# Patient Record
Sex: FEMALE | Race: WHITE | NOT HISPANIC OR LATINO | Employment: OTHER | URBAN - METROPOLITAN AREA
[De-identification: names, ages, dates, MRNs, and addresses within clinical notes are randomized per-mention and may not be internally consistent; named-entity substitution may affect disease eponyms.]

---

## 2017-01-06 ENCOUNTER — OFFICE VISIT (OUTPATIENT)
Dept: INTERNAL MEDICINE | Facility: CLINIC | Age: 72
End: 2017-01-06
Payer: MEDICARE

## 2017-01-06 VITALS
DIASTOLIC BLOOD PRESSURE: 78 MMHG | BODY MASS INDEX: 23.07 KG/M2 | HEIGHT: 67 IN | HEART RATE: 67 BPM | WEIGHT: 147 LBS | SYSTOLIC BLOOD PRESSURE: 136 MMHG

## 2017-01-06 DIAGNOSIS — E78.5 HYPERLIPIDEMIA, UNSPECIFIED HYPERLIPIDEMIA TYPE: ICD-10-CM

## 2017-01-06 DIAGNOSIS — F43.21 ADJUSTMENT DISORDER WITH DEPRESSED MOOD: Primary | ICD-10-CM

## 2017-01-06 DIAGNOSIS — I25.10 CORONARY ARTERY DISEASE INVOLVING NATIVE CORONARY ARTERY OF NATIVE HEART WITHOUT ANGINA PECTORIS: ICD-10-CM

## 2017-01-06 DIAGNOSIS — I10 ESSENTIAL HYPERTENSION: ICD-10-CM

## 2017-01-06 DIAGNOSIS — C44.02 SQUAMOUS CELL CARCINOMA, LIP: ICD-10-CM

## 2017-01-06 PROCEDURE — 1157F ADVNC CARE PLAN IN RCRD: CPT | Mod: S$GLB,,, | Performed by: INTERNAL MEDICINE

## 2017-01-06 PROCEDURE — 99499 UNLISTED E&M SERVICE: CPT | Mod: S$PBB,,, | Performed by: INTERNAL MEDICINE

## 2017-01-06 PROCEDURE — 1126F AMNT PAIN NOTED NONE PRSNT: CPT | Mod: S$GLB,,, | Performed by: INTERNAL MEDICINE

## 2017-01-06 PROCEDURE — 99999 PR PBB SHADOW E&M-EST. PATIENT-LVL III: CPT | Mod: PBBFAC,,, | Performed by: INTERNAL MEDICINE

## 2017-01-06 PROCEDURE — 99214 OFFICE O/P EST MOD 30 MIN: CPT | Mod: S$GLB,,, | Performed by: INTERNAL MEDICINE

## 2017-01-06 PROCEDURE — 3078F DIAST BP <80 MM HG: CPT | Mod: S$GLB,,, | Performed by: INTERNAL MEDICINE

## 2017-01-06 PROCEDURE — 1159F MED LIST DOCD IN RCRD: CPT | Mod: S$GLB,,, | Performed by: INTERNAL MEDICINE

## 2017-01-06 PROCEDURE — 3075F SYST BP GE 130 - 139MM HG: CPT | Mod: S$GLB,,, | Performed by: INTERNAL MEDICINE

## 2017-01-06 PROCEDURE — 1160F RVW MEDS BY RX/DR IN RCRD: CPT | Mod: S$GLB,,, | Performed by: INTERNAL MEDICINE

## 2017-01-06 RX ORDER — METOPROLOL TARTRATE 50 MG/1
50 TABLET ORAL 2 TIMES DAILY
Qty: 180 TABLET | Refills: 3 | Status: SHIPPED | OUTPATIENT
Start: 2017-01-06 | End: 2017-06-19 | Stop reason: SDUPTHER

## 2017-01-06 RX ORDER — ALPRAZOLAM 0.25 MG/1
TABLET ORAL
Qty: 20 TABLET | Refills: 0 | Status: SHIPPED | OUTPATIENT
Start: 2017-01-06 | End: 2017-03-23 | Stop reason: SDUPTHER

## 2017-01-06 RX ORDER — AMLODIPINE BESYLATE 2.5 MG/1
2.5 TABLET ORAL DAILY
Qty: 90 TABLET | Refills: 3 | Status: SHIPPED | OUTPATIENT
Start: 2017-01-06 | End: 2017-06-19 | Stop reason: SDUPTHER

## 2017-01-06 RX ORDER — MIRTAZAPINE 15 MG/1
15 TABLET, FILM COATED ORAL NIGHTLY
Qty: 90 TABLET | Refills: 1 | Status: SHIPPED | OUTPATIENT
Start: 2017-01-06 | End: 2017-06-19 | Stop reason: SDUPTHER

## 2017-01-06 RX ORDER — OMEPRAZOLE 20 MG/1
20 CAPSULE, DELAYED RELEASE ORAL DAILY
Qty: 90 CAPSULE | Refills: 3 | Status: SHIPPED | OUTPATIENT
Start: 2017-01-06 | End: 2017-06-19 | Stop reason: SDUPTHER

## 2017-01-06 RX ORDER — RAMIPRIL 10 MG/1
10 CAPSULE ORAL 2 TIMES DAILY
Qty: 180 CAPSULE | Refills: 3 | Status: SHIPPED | OUTPATIENT
Start: 2017-01-06 | End: 2017-06-19 | Stop reason: SDUPTHER

## 2017-01-06 RX ORDER — ROSUVASTATIN CALCIUM 20 MG/1
20 TABLET, COATED ORAL DAILY
Qty: 90 TABLET | Refills: 3 | Status: SHIPPED | OUTPATIENT
Start: 2017-01-06 | End: 2017-06-19 | Stop reason: SDUPTHER

## 2017-01-06 NOTE — MR AVS SNAPSHOT
[facility] [address]  [address]  [address]  [facility] [address]  Phone: [phone]                  Jacqueline O Favret   2017 11:30 AM   Office Visit    Description:  Female : 1945   Provider:  Kristina Orozco MD   Department:  [facility] [address]           Reason for Visit     Follow-up           Diagnoses this Visit        Comments    Adjustment disorder with depressed mood    -  Primary     Essential hypertension         Coronary artery disease involving native coronary artery of native heart without angina pectoris         Squamous cell carcinoma, lip         Hyperlipidemia, unspecified hyperlipidemia type                To Do List           Future Appointments        Provider Department Dept Phone    2017 8:15 AM LAB, [place] [facility] [phone]    2017 9:20 AM Ladonna Davalos MD [place] - Dermatology [phone]      Goals (5 Years of Data)     None      Follow-Up and Disposition     Return in about 6 months (around 2017).       These Medications        Disp Refills Start End    alprazolam (XANAX) 0.25 MG tablet 20 tablet 0 2017     Half to one tablet preflight    Pharmacy: [facility]/pharmacy [address] [place] - [address] [facility] Ph #: [phone]       ramipril (ALTACE) 10 MG capsule 180 capsule 3 2017     Take 1 capsule (10 mg total) by mouth 2 (two) times daily. - Oral    Pharmacy: [facility]/pharmacy [address] [place] - [address] [facility] Ph #: [phone]       mirtazapine (REMERON) 15 MG tablet 90 tablet 1 2017    Take 1 tablet (15 mg total) by mouth every evening. - Oral    Pharmacy: [facility]/pharmacy [address] [place] [address] [place] - [address] [facility] Ph #: [phone]       amlodipine (NORVASC) 2.5 MG tablet 90 tablet 3 2017    Take 1 tablet (2.5 mg total) by mouth once daily. - Oral    Pharmacy:  [facility]/pharmacy [address] [place] [address] - [address] [facility] #: [phone]       omeprazole (PRILOSEC) 20 MG capsule 90 capsule 3 1/6/2017     Take 1 capsule (20 mg total) by mouth once daily. - Oral    Pharmacy: [facility]/pharmacy [address] - [address] [facility] #: [phone]       rosuvastatin (CRESTOR) 20 MG tablet 90 tablet 3 1/6/2017     Take 1 tablet (20 mg total) by mouth once daily. - Oral    Pharmacy: [facility]/pharmacy [address] [place] [address] - [address] [facility] #: [phone]       metoprolol tartrate (LOPRESSOR) 50 MG tablet 180 tablet 3 1/6/2017     Take 1 tablet (50 mg total) by mouth 2 (two) times daily. - Oral    [facility] [place] [address] [facility] [address] [facility] #: [phone]         [facility]     [facility] On Call Nurse Care Line - 24/7 Assistance  Registered nurses in the [facility] On Call Center provide clinical advisement, health education, appointment booking, and other advisory services.  Call for this free service at [phone].             Medications           Message regarding Medications     Verify the changes and/or additions to your medication regime listed below are the same as discussed with your clinician today.  If any of these changes or additions are incorrect, please notify your healthcare provider.             Verify that the below list of medications is an accurate representation of the medications you are currently taking.  If none reported, the list may be blank. If incorrect, please contact your healthcare provider. Carry this list with you in case of emergency.           Current Medications     acetaminophen (TYLENOL) 325 MG tablet Take 325 mg by mouth every 6 (six) hours as needed.    alprazolam (XANAX) 0.25 MG tablet Half to one tablet preflight    amlodipine (NORVASC) 2.5 MG tablet Take 1 tablet (2.5 mg total) by mouth once daily.    fexofenadine  "(ALLEGRA) 180 MG tablet Take by mouth daily as needed. 1 Tablet Oral Every day    Lactobacillus rhamnosus GG (CULTURELLE) 10 billion cell capsule Take 1 capsule by mouth once daily.    metoprolol tartrate (LOPRESSOR) 50 MG tablet Take 1 tablet (50 mg total) by mouth 2 (two) times daily.    mirtazapine (REMERON) 15 MG tablet Take 1 tablet (15 mg total) by mouth every evening.    multivitamin (ONE DAILY MULTIVITAMIN) per tablet Take 1 tablet by mouth once daily.    omeprazole (PRILOSEC) 20 MG capsule Take 1 capsule (20 mg total) by mouth once daily.    ramipril (ALTACE) 10 MG capsule Take 1 capsule (10 mg total) by mouth 2 (two) times daily.    rosuvastatin (CRESTOR) 20 MG tablet Take 1 tablet (20 mg total) by mouth once daily.           Clinical Reference Information           Vital Signs - Last Recorded  Most recent update: 1/6/2017 12:16 PM by Lala Bonds MA    BP Pulse Ht Wt LMP BMI    136/78 67 5' 7" (1.702 m) 66.7 kg (147 lb) (LMP Unknown) 23.02 kg/m2      Blood Pressure          Most Recent Value    BP  136/78      Allergies as of 1/6/2017     No Known Allergies      Immunizations Administered on Date of Encounter - 1/6/2017     Name Date Dose VIS Date Route    Zoster  Incomplete 0.65 mL 10/6/2009 Subcutaneous      Orders Placed During Today's Visit      Normal Orders This Visit    Zoster Vaccine - Live     Future Labs/Procedures Expected by Expires    Comprehensive metabolic panel  1/6/2017 1/6/2018      "

## 2017-01-09 ENCOUNTER — LAB VISIT (OUTPATIENT)
Dept: LAB | Facility: HOSPITAL | Age: 72
End: 2017-01-09
Attending: INTERNAL MEDICINE
Payer: MEDICARE

## 2017-01-09 ENCOUNTER — PATIENT MESSAGE (OUTPATIENT)
Dept: INTERNAL MEDICINE | Facility: CLINIC | Age: 72
End: 2017-01-09

## 2017-01-09 DIAGNOSIS — I10 ESSENTIAL HYPERTENSION: ICD-10-CM

## 2017-01-09 LAB
ALBUMIN SERPL BCP-MCNC: 3.8 G/DL
ALP SERPL-CCNC: 71 U/L
ALT SERPL W/O P-5'-P-CCNC: 14 U/L
ANION GAP SERPL CALC-SCNC: 8 MMOL/L
AST SERPL-CCNC: 23 U/L
BILIRUB SERPL-MCNC: 0.6 MG/DL
BUN SERPL-MCNC: 15 MG/DL
CALCIUM SERPL-MCNC: 9.6 MG/DL
CHLORIDE SERPL-SCNC: 100 MMOL/L
CO2 SERPL-SCNC: 31 MMOL/L
CREAT SERPL-MCNC: 0.8 MG/DL
EST. GFR  (AFRICAN AMERICAN): >60 ML/MIN/1.73 M^2
EST. GFR  (NON AFRICAN AMERICAN): >60 ML/MIN/1.73 M^2
GLUCOSE SERPL-MCNC: 108 MG/DL
POTASSIUM SERPL-SCNC: 4.3 MMOL/L
PROT SERPL-MCNC: 7.1 G/DL
SODIUM SERPL-SCNC: 139 MMOL/L

## 2017-01-09 PROCEDURE — 80053 COMPREHEN METABOLIC PANEL: CPT

## 2017-01-09 PROCEDURE — 36415 COLL VENOUS BLD VENIPUNCTURE: CPT | Mod: PO

## 2017-01-17 NOTE — PROGRESS NOTES
Subjective:       Patient ID: Jacqueline O Favret is a 71 y.o. female.    Chief Complaint: Follow-up    HPIPt feeling well very active.  No Cp or SOB.  Labs look great.  Review of Systems   Respiratory: Negative for shortness of breath (PND or orthopnea).    Cardiovascular: Negative for chest pain (arm pain or jaw pain).   Gastrointestinal: Negative for abdominal pain, diarrhea, nausea and vomiting.   Genitourinary: Negative for dysuria.   Neurological: Negative for seizures, syncope and headaches.       Objective:      Physical Exam   Constitutional: She is oriented to person, place, and time. She appears well-developed and well-nourished. No distress.   HENT:   Head: Normocephalic.   Mouth/Throat: Oropharynx is clear and moist.   Neck: Neck supple. No JVD present. No thyromegaly present.   Cardiovascular: Normal rate, regular rhythm, normal heart sounds and intact distal pulses.  Exam reveals no gallop and no friction rub.    No murmur heard.  Pulmonary/Chest: Effort normal and breath sounds normal. She has no wheezes. She has no rales.   Abdominal: Soft. Bowel sounds are normal. She exhibits no distension and no mass. There is no tenderness. There is no rebound and no guarding.   Musculoskeletal: She exhibits no edema.   Lymphadenopathy:     She has no cervical adenopathy.   Neurological: She is alert and oriented to person, place, and time.   Skin: Skin is warm and dry.   Psychiatric: She has a normal mood and affect. Her behavior is normal. Judgment and thought content normal.       Assessment:       1. Adjustment disorder with depressed mood    2. Essential hypertension    3. Coronary artery disease involving native coronary artery of native heart without angina pectoris    4. Squamous cell carcinoma, lip    5. Hyperlipidemia, unspecified hyperlipidemia type        Plan:   Adjustment disorder with depressed mood  stable    Essential hypertension  -     Comprehensive metabolic panel; Future; Expected date:  1/6/17  Controlled - continue current meds    Coronary artery disease involving native coronary artery of native heart without angina pectoris  Asymptomatic  Squamous cell carcinoma, lip  Follows closely with derm  Hyperlipidemia, unspecified hyperlipidemia type  Controlled - continue current meds    Other orders  -     alprazolam (XANAX) 0.25 MG tablet; Half to one tablet preflight  Dispense: 20 tablet; Refill: 0  -     ramipril (ALTACE) 10 MG capsule; Take 1 capsule (10 mg total) by mouth 2 (two) times daily.  Dispense: 180 capsule; Refill: 3  -     mirtazapine (REMERON) 15 MG tablet; Take 1 tablet (15 mg total) by mouth every evening.  Dispense: 90 tablet; Refill: 1  -     amlodipine (NORVASC) 2.5 MG tablet; Take 1 tablet (2.5 mg total) by mouth once daily.  Dispense: 90 tablet; Refill: 3  -     omeprazole (PRILOSEC) 20 MG capsule; Take 1 capsule (20 mg total) by mouth once daily.  Dispense: 90 capsule; Refill: 3  -     rosuvastatin (CRESTOR) 20 MG tablet; Take 1 tablet (20 mg total) by mouth once daily.  Dispense: 90 tablet; Refill: 3  -     metoprolol tartrate (LOPRESSOR) 50 MG tablet; Take 1 tablet (50 mg total) by mouth 2 (two) times daily.  Dispense: 180 tablet; Refill: 3  -     Zoster Vaccine - Live

## 2017-01-26 ENCOUNTER — OFFICE VISIT (OUTPATIENT)
Dept: OPTOMETRY | Facility: CLINIC | Age: 72
End: 2017-01-26

## 2017-01-26 ENCOUNTER — OFFICE VISIT (OUTPATIENT)
Dept: OPTOMETRY | Facility: CLINIC | Age: 72
End: 2017-01-26
Payer: MEDICARE

## 2017-01-26 DIAGNOSIS — Z46.0 ENCOUNTER FOR FITTING OR ADJUSTMENT OF SPECTACLES OR CONTACT LENSES: Primary | ICD-10-CM

## 2017-01-26 PROCEDURE — 99499 UNLISTED E&M SERVICE: CPT | Mod: S$GLB,,, | Performed by: OPTOMETRIST

## 2017-01-26 PROCEDURE — 92310 CONTACT LENS FITTING OU: CPT | Mod: S$GLB,,, | Performed by: OPTOMETRIST

## 2017-01-26 PROCEDURE — 99999 PR PBB SHADOW E&M-EST. PATIENT-LVL III: CPT | Mod: PBBFAC,,, | Performed by: OPTOMETRIST

## 2017-01-26 NOTE — PATIENT INSTRUCTIONS
Good contact lens fit in each eye. Wearing CLs well in each eye.  Doing well with monovision, but using single vision driving glasses for use over CLs.  Showing need for CL power in right eye (for distance) and in the left eye (for near).  CL Rx issued.  Return at the time of annual general eye exam - or prior if any problems in the interim.

## 2017-01-26 NOTE — PROGRESS NOTES
HPI     Contact Lens Follow Up    Additional comments: contact lens follow up.  Wears monovision SCLs.    Happy with lens comfort OD and OS.  Feels distance VA a little blurry.    Happy with near vision with CLs.           Comments   Patient in for contact lens follow-up.    Contact lenses patient currently wearing:   Air Optix Aqua SCLs                                                                        OD   8.6   14.2    +1.75   distance                                                                       OS   8.6   14.2    +3.25  near    Patient's report on visual acuity with contact lenses:  Distance:  A   little blurry                                                                                          Near good    Any problems with Contact Lens comfort?  no    Contact lens wearing time (hours/per day): 10 - 11 hours    How long have Contact Lenses been in today?  3+ hours    Refer to CL section of chart.          Last edited by Jeromy Goodman, OD on 1/26/2017 10:24 AM. (History)            Assessment /Plan     For exam results, see Encounter Report.    1. Encounter for fitting or adjustment of spectacles or contact lenses                    Good contact lens fit in each eye. Wearing CLs well in each eye.  Doing well with monovision, but using single vision driving glasses for use over CLs.  Showing need for CL power in right eye (for distance) and in the left eye (for near).  CL Rx issued.  Return at the time of annual general eye exam - or prior if any problems in the interim.

## 2017-01-29 NOTE — PROGRESS NOTES
HPI     Contact Lens Follow Up    Additional comments: Rx issue           Comments   Patient is in stating her contact lens rx with distance vision is not as   sharp as before.     (-) blurry vision   (-) flashes of light   (-) floaters        Last edited by Mi Nassar on 1/26/2017 10:00 AM. (History)            Assessment /Plan     For exam results, see Encounter Report.    Encounter for fitting or adjustment of spectacles or contact lenses

## 2017-02-03 ENCOUNTER — PATIENT MESSAGE (OUTPATIENT)
Dept: OPTOMETRY | Facility: CLINIC | Age: 72
End: 2017-02-03

## 2017-02-08 ENCOUNTER — OFFICE VISIT (OUTPATIENT)
Dept: DERMATOLOGY | Facility: CLINIC | Age: 72
End: 2017-02-08
Payer: MEDICARE

## 2017-02-08 DIAGNOSIS — L57.0 AK (ACTINIC KERATOSIS): Primary | ICD-10-CM

## 2017-02-08 DIAGNOSIS — D04.30 SQUAMOUS CELL CARCINOMA IN SITU OF SKIN OF FACE: ICD-10-CM

## 2017-02-08 PROCEDURE — 99212 OFFICE O/P EST SF 10 MIN: CPT | Mod: S$GLB,,, | Performed by: DERMATOLOGY

## 2017-02-08 PROCEDURE — 1160F RVW MEDS BY RX/DR IN RCRD: CPT | Mod: S$GLB,,, | Performed by: DERMATOLOGY

## 2017-02-08 PROCEDURE — 99999 PR PBB SHADOW E&M-EST. PATIENT-LVL II: CPT | Mod: PBBFAC,,, | Performed by: DERMATOLOGY

## 2017-02-08 PROCEDURE — 1157F ADVNC CARE PLAN IN RCRD: CPT | Mod: S$GLB,,, | Performed by: DERMATOLOGY

## 2017-02-08 PROCEDURE — 1159F MED LIST DOCD IN RCRD: CPT | Mod: S$GLB,,, | Performed by: DERMATOLOGY

## 2017-02-08 NOTE — PROGRESS NOTES
Subjective:       Patient ID:  Jacqueline O Favret is a 71 y.o. female who presents for   Chief Complaint   Patient presents with    Actinic Keratosis     bx proven lip recheck     HPI Comments: Pt here for 6 month re-check bx proven actinic keratosis left lower lip. Area was tx with efudex x 10 days with brisk response. This was near the site of previous scc in situ.  Pt reports no problems, well healed.  Not currently treating with anything.does wear lip balm with spf    Actinic Keratosis         Review of Systems   Constitutional: Negative for fever, chills, fatigue and malaise.   Skin: Positive for daily sunscreen use.   Hematologic/Lymphatic: Bruises/bleeds easily.        Objective:    Physical Exam   Constitutional: She appears well-developed and well-nourished. No distress.   Neurological: She is alert and oriented to person, place, and time. She is not disoriented.   Psychiatric: She has a normal mood and affect.   Skin:   Areas Examined (abnormalities noted in diagram):   Head / Face Inspection Performed              Diagram Legend     Erythematous scaling macule/papule c/w actinic keratosis       Vascular papule c/w angioma      Pigmented verrucoid papule/plaque c/w seborrheic keratosis      Yellow umbilicated papule c/w sebaceous hyperplasia      Irregularly shaped tan macule c/w lentigo     1-2 mm smooth white papules consistent with Milia      Movable subcutaneous cyst with punctum c/w epidermal inclusion cyst      Subcutaneous movable cyst c/w pilar cyst      Firm pink to brown papule c/w dermatofibroma      Pedunculated fleshy papule(s) c/w skin tag(s)      Evenly pigmented macule c/w junctional nevus     Mildly variegated pigmented, slightly irregular-bordered macule c/w mildly atypical nevus      Flesh colored to evenly pigmented papule c/w intradermal nevus       Pink pearly papule/plaque c/w basal cell carcinoma      Erythematous hyperkeratotic cursted plaque c/w SCC      Surgical scar with no  sign of skin cancer recurrence      Open and closed comedones      Inflammatory papules and pustules      Verrucoid papule consistent consistent with wart     Erythematous eczematous patches and plaques     Dystrophic onycholytic nail with subungual debris c/w onychomycosis     Umbilicated papule    Erythematous-base heme-crusted tan verrucoid plaque consistent with inflamed seborrheic keratosis     Erythematous Silvery Scaling Plaque c/w Psoriasis     See annotation      Assessment / Plan:        AK (actinic keratosis)- left lower lip   S/p efudex with great response  Lip balm with spf     Squamous cell carcinoma in situ of skin of face  Scar no recurrence  Cont daily spf         Return in about 6 months (around 8/8/2017).

## 2017-03-13 ENCOUNTER — OFFICE VISIT (OUTPATIENT)
Dept: INTERNAL MEDICINE | Facility: CLINIC | Age: 72
End: 2017-03-13
Payer: MEDICARE

## 2017-03-13 VITALS
DIASTOLIC BLOOD PRESSURE: 72 MMHG | WEIGHT: 145.94 LBS | BODY MASS INDEX: 22.91 KG/M2 | HEIGHT: 67 IN | SYSTOLIC BLOOD PRESSURE: 129 MMHG | TEMPERATURE: 98 F | OXYGEN SATURATION: 95 % | HEART RATE: 74 BPM

## 2017-03-13 DIAGNOSIS — J20.9 BRONCHITIS, ACUTE, WITH BRONCHOSPASM: Primary | ICD-10-CM

## 2017-03-13 PROCEDURE — 3074F SYST BP LT 130 MM HG: CPT | Mod: S$GLB,,, | Performed by: INTERNAL MEDICINE

## 2017-03-13 PROCEDURE — 1159F MED LIST DOCD IN RCRD: CPT | Mod: S$GLB,,, | Performed by: INTERNAL MEDICINE

## 2017-03-13 PROCEDURE — 99999 PR PBB SHADOW E&M-EST. PATIENT-LVL III: CPT | Mod: PBBFAC,,, | Performed by: INTERNAL MEDICINE

## 2017-03-13 PROCEDURE — 1157F ADVNC CARE PLAN IN RCRD: CPT | Mod: S$GLB,,, | Performed by: INTERNAL MEDICINE

## 2017-03-13 PROCEDURE — 1160F RVW MEDS BY RX/DR IN RCRD: CPT | Mod: S$GLB,,, | Performed by: INTERNAL MEDICINE

## 2017-03-13 PROCEDURE — 99213 OFFICE O/P EST LOW 20 MIN: CPT | Mod: S$GLB,,, | Performed by: INTERNAL MEDICINE

## 2017-03-13 PROCEDURE — 3078F DIAST BP <80 MM HG: CPT | Mod: S$GLB,,, | Performed by: INTERNAL MEDICINE

## 2017-03-13 RX ORDER — ALBUTEROL SULFATE 90 UG/1
2 AEROSOL, METERED RESPIRATORY (INHALATION) 4 TIMES DAILY
Qty: 1 INHALER | Refills: 0 | Status: SHIPPED | OUTPATIENT
Start: 2017-03-13 | End: 2017-07-06 | Stop reason: SDUPTHER

## 2017-03-13 RX ORDER — DOXYCYCLINE 100 MG/1
100 CAPSULE ORAL EVERY 12 HOURS
Qty: 20 CAPSULE | Refills: 0 | Status: SHIPPED | OUTPATIENT
Start: 2017-03-13 | End: 2017-03-23

## 2017-03-13 NOTE — PROGRESS NOTES
Jacqueline O Favret presents today to urgent care for:  Cough and Nasal Congestion      Cough   This is a new problem. The current episode started in the past 7 days. The problem has been gradually worsening. The problem occurs constantly. The cough is productive of sputum. Associated symptoms include ear congestion and wheezing. Pertinent negatives include no chest pain, chills, ear pain, headaches, hemoptysis, myalgias, nasal congestion, postnasal drip, rhinorrhea or shortness of breath. The symptoms are aggravated by lying down. She has tried OTC cough suppressant for the symptoms. The treatment provided no relief. Her past medical history is significant for bronchitis. There is no history of COPD, emphysema or pneumonia.       Past medical, social, family and surgical history was reviewed and updated today as needed. See encounter for details.     Review of Systems   Constitutional: Negative for chills.   HENT: Negative for ear pain, postnasal drip and rhinorrhea.    Respiratory: Positive for cough and wheezing. Negative for hemoptysis and shortness of breath.    Cardiovascular: Negative for chest pain.   Musculoskeletal: Negative for myalgias.   Neurological: Negative for headaches.       Vitals:    03/13/17 0810   BP: 129/72   Pulse: 74   Temp: 98.4 °F (36.9 °C)   Body mass index is 22.86 kg/(m^2).   Physical Exam   Constitutional: She is oriented to person, place, and time. She appears well-developed.   HENT:   Head: Normocephalic and atraumatic.   Right Ear: External ear normal.   Left Ear: External ear normal.   Nose: Nose normal.   Mouth/Throat: Oropharynx is clear and moist. No oropharyngeal exudate.   Eyes: Conjunctivae are normal.   Cardiovascular: Normal rate, regular rhythm and normal heart sounds.    Pulmonary/Chest: No respiratory distress. She has wheezes. She has no rales. She exhibits no tenderness.   Abdominal: Soft.   Lymphadenopathy:     She has no cervical adenopathy.   Neurological: She is  alert and oriented to person, place, and time.        Assessment/plan:   1. Bronchitis, acute, with bronchospasm  - albuterol 90 mcg/actuation inhaler; Inhale 2 puffs into the lungs 4 (four) times daily.  Dispense: 1 Inhaler; Refill: 0  - doxycycline (VIBRAMYCIN) 100 MG Cap; Take 1 capsule (100 mg total) by mouth every 12 (twelve) hours.  Dispense: 20 capsule; Refill: 0      No Follow-up on file.  All risks and benefits of medications discussed with the patient today in detail. Pt. Voiced understanding and was provided with patient educational materials regarding these medications and encouraged to read this material and call the office with any questions or concerns.

## 2017-03-13 NOTE — PATIENT INSTRUCTIONS
Bronchitis with Wheezing (Viral or Bacterial: Adult)    Bronchitis is an infection of the air passages. It often occurs during a cold and is usually caused by a virus. Symptoms include cough with mucus (phlegm) and low-grade fever. This illness is contagious during the first few days and is spread through the air by coughing and sneezing, or by direct contact (touching the sick person and then touching your own eyes, nose, or mouth).  If there is a lot of inflammation, air flow is restricted. The air passages may also go into spasm, especially if you have asthma. This causes wheezing and difficulty breathing even in people who do not have asthma.  Bronchitis usually lasts 7 to 14 days. The wheezing should improve with treatment during the first week. An inhaler is often prescribed to relax the air passages and stop wheezing. Antibiotics will be prescribed if your doctor thinks there is also a secondary bacterial infection.  Home care  · If symptoms are severe, rest at home for the first 2 to 3 days. When you go back to your usual activities, don't let yourself get too tired.  · Do not smoke. Also avoid being exposed to secondhand smoke.  · You may use over-the-counter medicine to control fever or pain, unless another medicine was prescribed. Note: If you have chronic liver or kidney disease or have ever had a stomach ulcer or gastrointestinal bleeding, talk with your healthcare provider before using these medicines. Also talk to your provider if you are taking medicine to prevent blood clots.) Aspirin should never be given to anyone younger than 18 years of age who is ill with a viral infection or fever. It may cause severe liver or brain damage.  · Your appetite may be poor, so a light diet is fine. Avoid dehydration by drinking 6 to 8 glasses of fluids per day (such as water, soft drinks, sports drinks, juices, tea, or soup). Extra fluids will help loosen secretions in the nose and lungs.  · Over-the-counter  cough, cold, and sore-throat medicines will not shorten the length of the illness, but they may be helpful to reduce symptoms. (Note: Do not use decongestants if you have high blood pressure.)  · If you were given an inhaler, use it exactly as directed. If you need to use it more often than prescribed, your condition may be worsening. If this happens, contact your healthcare provider.  · If prescribed, finish all antibiotic medicine, even if you are feeling better after only a few days.  Follow-up care  Follow up with your healthcare provider, or as advised. If you had an X-ray or ECG (electrocardiogram), a specialist will review it. You will be notified of any new findings that may affect your care.  Note: If you are age 65 or older, or if you have a chronic lung disease or condition that affects your immune system, or you smoke, talk to your healthcare provider about having a pneumococcal vaccinations and a yearly influenza vaccination (flu shot).  When to seek medical advice  Call your healthcare provider right away if any of these occur:  · Fever of 100.4°F (38°C) or higher  · Coughing up increasing amounts of colored sputum  · Weakness, drowsiness, headache, facial pain, ear pain, or a stiff neck  Call 911, or get immediate medical care  Contact emergency services right away if any of these occur.  · Coughing up blood  · Worsening weakness, drowsiness, headache, or stiff neck  · Increased wheezing not helped with medication, shortness of breath, or pain with breathing  Date Last Reviewed: 9/13/2015  © 8057-9889 [employer]. [address] [place] [address], [place] [address]. All rights reserved. This information is not intended as a substitute for professional medical care. Always follow your healthcare professional's instructions.

## 2017-03-26 RX ORDER — ALPRAZOLAM 0.25 MG/1
TABLET ORAL
Qty: 20 TABLET | Refills: 0 | Status: SHIPPED | OUTPATIENT
Start: 2017-03-26 | End: 2017-06-19 | Stop reason: SDUPTHER

## 2017-03-27 ENCOUNTER — TELEPHONE (OUTPATIENT)
Dept: INTERNAL MEDICINE | Facility: CLINIC | Age: 72
End: 2017-03-27

## 2017-03-31 DIAGNOSIS — N76.0 VULVOVAGINITIS: ICD-10-CM

## 2017-03-31 RX ORDER — FLUCONAZOLE 150 MG/1
TABLET ORAL
Qty: 1 TABLET | Refills: 1 | Status: SHIPPED | OUTPATIENT
Start: 2017-03-31 | End: 2018-01-05

## 2017-06-14 ENCOUNTER — OFFICE VISIT (OUTPATIENT)
Dept: DERMATOLOGY | Facility: CLINIC | Age: 72
End: 2017-06-14
Payer: MEDICARE

## 2017-06-14 DIAGNOSIS — L57.0 AK (ACTINIC KERATOSIS): Primary | ICD-10-CM

## 2017-06-14 PROCEDURE — 99999 PR PBB SHADOW E&M-EST. PATIENT-LVL II: CPT | Mod: PBBFAC,,, | Performed by: DERMATOLOGY

## 2017-06-14 PROCEDURE — 17003 DESTRUCT PREMALG LES 2-14: CPT | Mod: S$GLB,,, | Performed by: DERMATOLOGY

## 2017-06-14 PROCEDURE — 99499 UNLISTED E&M SERVICE: CPT | Mod: S$GLB,,, | Performed by: DERMATOLOGY

## 2017-06-14 PROCEDURE — 17000 DESTRUCT PREMALG LESION: CPT | Mod: S$GLB,,, | Performed by: DERMATOLOGY

## 2017-06-14 NOTE — PROGRESS NOTES
Subjective:       Patient ID:  Jacqueline O Favret is a 71 y.o. female who presents for   Chief Complaint   Patient presents with    Lesion     Pt c/o small bump on right nose x a few days. Feels rough and scaly. No bleeding or pain. No prev tx. Pt has hx of NMSC on the face in the past        Review of Systems   Skin: Positive for daily sunscreen use.   Hematologic/Lymphatic: Bruises/bleeds easily.        Objective:    Physical Exam   Constitutional: She appears well-developed and well-nourished. No distress.   Neurological: She is alert and oriented to person, place, and time. She is not disoriented.   Psychiatric: She has a normal mood and affect.   Skin:   Areas Examined (abnormalities noted in diagram):   Head / Face Inspection Performed              Diagram Legend     Erythematous scaling macule/papule c/w actinic keratosis       Vascular papule c/w angioma      Pigmented verrucoid papule/plaque c/w seborrheic keratosis      Yellow umbilicated papule c/w sebaceous hyperplasia      Irregularly shaped tan macule c/w lentigo     1-2 mm smooth white papules consistent with Milia      Movable subcutaneous cyst with punctum c/w epidermal inclusion cyst      Subcutaneous movable cyst c/w pilar cyst      Firm pink to brown papule c/w dermatofibroma      Pedunculated fleshy papule(s) c/w skin tag(s)      Evenly pigmented macule c/w junctional nevus     Mildly variegated pigmented, slightly irregular-bordered macule c/w mildly atypical nevus      Flesh colored to evenly pigmented papule c/w intradermal nevus       Pink pearly papule/plaque c/w basal cell carcinoma      Erythematous hyperkeratotic cursted plaque c/w SCC      Surgical scar with no sign of skin cancer recurrence      Open and closed comedones      Inflammatory papules and pustules      Verrucoid papule consistent consistent with wart     Erythematous eczematous patches and plaques     Dystrophic onycholytic nail with subungual debris c/w onychomycosis      Umbilicated papule    Erythematous-base heme-crusted tan verrucoid plaque consistent with inflamed seborrheic keratosis     Erythematous Silvery Scaling Plaque c/w Psoriasis     See annotation      Assessment / Plan:        AK (actinic keratosis)    Cryosurgery Procedure Note    Verbal consent from the patient is obtained and the patient is aware of the precancerous quality and need for treatment of these lesions. Liquid nitrogen cryosurgery is applied to the 4 actinic keratoses, as detailed in the physical exam, to produce a freeze injury. The patient is aware that blisters may form and is instructed on wound care with gentle cleansing and use of vaseline ointment to keep moist until healed. The patient is supplied a handout on cryosurgery and is instructed to call if lesions do not completely resolve.           Return in about 4 months (around 10/14/2017).

## 2017-06-19 ENCOUNTER — OFFICE VISIT (OUTPATIENT)
Dept: INTERNAL MEDICINE | Facility: CLINIC | Age: 72
End: 2017-06-19
Payer: MEDICARE

## 2017-06-19 ENCOUNTER — LAB VISIT (OUTPATIENT)
Dept: LAB | Facility: HOSPITAL | Age: 72
End: 2017-06-19
Attending: INTERNAL MEDICINE
Payer: MEDICARE

## 2017-06-19 ENCOUNTER — PATIENT MESSAGE (OUTPATIENT)
Dept: INTERNAL MEDICINE | Facility: CLINIC | Age: 72
End: 2017-06-19

## 2017-06-19 VITALS
HEART RATE: 68 BPM | WEIGHT: 143 LBS | DIASTOLIC BLOOD PRESSURE: 81 MMHG | SYSTOLIC BLOOD PRESSURE: 135 MMHG | HEIGHT: 67 IN | BODY MASS INDEX: 22.44 KG/M2

## 2017-06-19 DIAGNOSIS — R73.9 HYPERGLYCEMIA: ICD-10-CM

## 2017-06-19 DIAGNOSIS — I25.10 CORONARY ARTERY DISEASE INVOLVING NATIVE CORONARY ARTERY OF NATIVE HEART WITHOUT ANGINA PECTORIS: ICD-10-CM

## 2017-06-19 DIAGNOSIS — E78.5 HYPERLIPIDEMIA, UNSPECIFIED HYPERLIPIDEMIA TYPE: ICD-10-CM

## 2017-06-19 DIAGNOSIS — I10 ESSENTIAL HYPERTENSION: Primary | ICD-10-CM

## 2017-06-19 DIAGNOSIS — I25.10 CVD (CARDIOVASCULAR DISEASE): ICD-10-CM

## 2017-06-19 DIAGNOSIS — E55.9 MILD VITAMIN D DEFICIENCY: ICD-10-CM

## 2017-06-19 DIAGNOSIS — I10 ESSENTIAL HYPERTENSION: ICD-10-CM

## 2017-06-19 LAB
25(OH)D3+25(OH)D2 SERPL-MCNC: 36 NG/ML
ALBUMIN SERPL BCP-MCNC: 3.9 G/DL
ALP SERPL-CCNC: 63 U/L
ALT SERPL W/O P-5'-P-CCNC: 16 U/L
ANION GAP SERPL CALC-SCNC: 15 MMOL/L
AST SERPL-CCNC: 26 U/L
BASOPHILS # BLD AUTO: 0.02 K/UL
BASOPHILS NFR BLD: 0.2 %
BILIRUB SERPL-MCNC: 0.8 MG/DL
BILIRUB UR QL STRIP: NEGATIVE
BUN SERPL-MCNC: 15 MG/DL
CALCIUM SERPL-MCNC: 9.8 MG/DL
CHLORIDE SERPL-SCNC: 101 MMOL/L
CHOLEST/HDLC SERPL: 3.1 {RATIO}
CLARITY UR REFRACT.AUTO: ABNORMAL
CO2 SERPL-SCNC: 25 MMOL/L
COLOR UR AUTO: ABNORMAL
CREAT SERPL-MCNC: 0.9 MG/DL
CREAT UR-MCNC: 246 MG/DL
DIFFERENTIAL METHOD: ABNORMAL
EOSINOPHIL # BLD AUTO: 0.2 K/UL
EOSINOPHIL NFR BLD: 2.2 %
ERYTHROCYTE [DISTWIDTH] IN BLOOD BY AUTOMATED COUNT: 12.9 %
EST. GFR  (AFRICAN AMERICAN): >60 ML/MIN/1.73 M^2
EST. GFR  (NON AFRICAN AMERICAN): >60 ML/MIN/1.73 M^2
GLUCOSE SERPL-MCNC: 99 MG/DL
GLUCOSE UR QL STRIP: NEGATIVE
HCT VFR BLD AUTO: 45.4 %
HDL/CHOLESTEROL RATIO: 32.4 %
HDLC SERPL-MCNC: 207 MG/DL
HDLC SERPL-MCNC: 67 MG/DL
HGB BLD-MCNC: 14.7 G/DL
HGB UR QL STRIP: NEGATIVE
KETONES UR QL STRIP: NEGATIVE
LDLC SERPL CALC-MCNC: 105 MG/DL
LEUKOCYTE ESTERASE UR QL STRIP: NEGATIVE
LYMPHOCYTES # BLD AUTO: 1.6 K/UL
LYMPHOCYTES NFR BLD: 19.8 %
MCH RBC QN AUTO: 31.5 PG
MCHC RBC AUTO-ENTMCNC: 32.4 %
MCV RBC AUTO: 97 FL
MICROALBUMIN UR DL<=1MG/L-MCNC: 21 UG/ML
MICROALBUMIN/CREATININE RATIO: 8.5 UG/MG
MONOCYTES # BLD AUTO: 1.1 K/UL
MONOCYTES NFR BLD: 12.8 %
NEUTROPHILS # BLD AUTO: 5.4 K/UL
NEUTROPHILS NFR BLD: 65 %
NITRITE UR QL STRIP: NEGATIVE
NONHDLC SERPL-MCNC: 140 MG/DL
PH UR STRIP: 5 [PH] (ref 5–8)
PLATELET # BLD AUTO: 202 K/UL
PMV BLD AUTO: 10.9 FL
POTASSIUM SERPL-SCNC: 3.9 MMOL/L
PROT SERPL-MCNC: 7.2 G/DL
PROT UR QL STRIP: NEGATIVE
RBC # BLD AUTO: 4.66 M/UL
SODIUM SERPL-SCNC: 141 MMOL/L
SP GR UR STRIP: 1.02 (ref 1–1.03)
TRIGL SERPL-MCNC: 175 MG/DL
TSH SERPL DL<=0.005 MIU/L-ACNC: 1.7 UIU/ML
URN SPEC COLLECT METH UR: ABNORMAL
UROBILINOGEN UR STRIP-ACNC: NEGATIVE EU/DL
WBC # BLD AUTO: 8.29 K/UL

## 2017-06-19 PROCEDURE — 1126F AMNT PAIN NOTED NONE PRSNT: CPT | Mod: S$GLB,,, | Performed by: INTERNAL MEDICINE

## 2017-06-19 PROCEDURE — 90732 PPSV23 VACC 2 YRS+ SUBQ/IM: CPT | Mod: S$GLB,,, | Performed by: INTERNAL MEDICINE

## 2017-06-19 PROCEDURE — 80061 LIPID PANEL: CPT

## 2017-06-19 PROCEDURE — 99999 PR PBB SHADOW E&M-EST. PATIENT-LVL III: CPT | Mod: PBBFAC,,, | Performed by: INTERNAL MEDICINE

## 2017-06-19 PROCEDURE — 83036 HEMOGLOBIN GLYCOSYLATED A1C: CPT

## 2017-06-19 PROCEDURE — 84443 ASSAY THYROID STIM HORMONE: CPT

## 2017-06-19 PROCEDURE — 85025 COMPLETE CBC W/AUTO DIFF WBC: CPT | Mod: PO

## 2017-06-19 PROCEDURE — 36415 COLL VENOUS BLD VENIPUNCTURE: CPT | Mod: PO

## 2017-06-19 PROCEDURE — 99214 OFFICE O/P EST MOD 30 MIN: CPT | Mod: 25,S$GLB,, | Performed by: INTERNAL MEDICINE

## 2017-06-19 PROCEDURE — 1159F MED LIST DOCD IN RCRD: CPT | Mod: S$GLB,,, | Performed by: INTERNAL MEDICINE

## 2017-06-19 PROCEDURE — 80053 COMPREHEN METABOLIC PANEL: CPT

## 2017-06-19 PROCEDURE — 82306 VITAMIN D 25 HYDROXY: CPT

## 2017-06-19 PROCEDURE — G0009 ADMIN PNEUMOCOCCAL VACCINE: HCPCS | Mod: S$GLB,,, | Performed by: INTERNAL MEDICINE

## 2017-06-19 PROCEDURE — 99499 UNLISTED E&M SERVICE: CPT | Mod: S$PBB,,, | Performed by: INTERNAL MEDICINE

## 2017-06-19 RX ORDER — METOPROLOL TARTRATE 50 MG/1
50 TABLET ORAL 2 TIMES DAILY
Qty: 180 TABLET | Refills: 3 | Status: SHIPPED | OUTPATIENT
Start: 2017-06-19 | End: 2018-07-14 | Stop reason: SDUPTHER

## 2017-06-19 RX ORDER — ALPRAZOLAM 0.25 MG/1
TABLET ORAL
Qty: 20 TABLET | Refills: 0 | Status: SHIPPED | OUTPATIENT
Start: 2017-06-19 | End: 2017-09-01 | Stop reason: SDUPTHER

## 2017-06-19 RX ORDER — MIRTAZAPINE 15 MG/1
15 TABLET, FILM COATED ORAL NIGHTLY
Qty: 90 TABLET | Refills: 1 | Status: SHIPPED | OUTPATIENT
Start: 2017-06-19 | End: 2018-02-04 | Stop reason: SDUPTHER

## 2017-06-19 RX ORDER — AMLODIPINE BESYLATE 2.5 MG/1
2.5 TABLET ORAL DAILY
Qty: 90 TABLET | Refills: 3 | Status: SHIPPED | OUTPATIENT
Start: 2017-06-19 | End: 2018-06-19

## 2017-06-19 RX ORDER — ROSUVASTATIN CALCIUM 20 MG/1
20 TABLET, COATED ORAL DAILY
Qty: 90 TABLET | Refills: 3 | Status: SHIPPED | OUTPATIENT
Start: 2017-06-19 | End: 2018-07-14 | Stop reason: SDUPTHER

## 2017-06-19 RX ORDER — RAMIPRIL 10 MG/1
10 CAPSULE ORAL 2 TIMES DAILY
Qty: 180 CAPSULE | Refills: 3 | Status: SHIPPED | OUTPATIENT
Start: 2017-06-19 | End: 2018-08-10 | Stop reason: SDUPTHER

## 2017-06-19 RX ORDER — OMEPRAZOLE 20 MG/1
20 CAPSULE, DELAYED RELEASE ORAL DAILY
Qty: 90 CAPSULE | Refills: 3 | Status: SHIPPED | OUTPATIENT
Start: 2017-06-19 | End: 2018-11-05 | Stop reason: SDUPTHER

## 2017-06-19 RX ORDER — UBIDECARENONE 30 MG
30 CAPSULE ORAL DAILY
COMMUNITY

## 2017-06-20 LAB
ESTIMATED AVG GLUCOSE: 103 MG/DL
HBA1C MFR BLD HPLC: 5.2 %

## 2017-07-02 NOTE — PROGRESS NOTES
Subjective:       Patient ID: Jacqueline O Favret is a 71 y.o. female.    Chief Complaint: Follow-up    HPIPt feels well is exercising.  Minimal alcohol.  No falls.  No CP or SOB. No GI issues.  Review of Systems   Respiratory: Negative for shortness of breath (PND or orthopnea).    Cardiovascular: Negative for chest pain (arm pain or jaw pain).   Gastrointestinal: Negative for abdominal pain, diarrhea, nausea and vomiting.   Genitourinary: Negative for dysuria.   Neurological: Negative for seizures, syncope and headaches.       Objective:      Physical Exam   Constitutional: She is oriented to person, place, and time. She appears well-developed and well-nourished. No distress.   HENT:   Head: Normocephalic.   Mouth/Throat: Oropharynx is clear and moist.   Neck: Neck supple. No JVD present. No thyromegaly present.   Cardiovascular: Normal rate, regular rhythm, normal heart sounds and intact distal pulses.  Exam reveals no gallop and no friction rub.    No murmur heard.  Pulmonary/Chest: Effort normal and breath sounds normal. She has no wheezes. She has no rales.   Abdominal: Soft. Bowel sounds are normal. She exhibits no distension and no mass. There is no tenderness. There is no rebound and no guarding.   Musculoskeletal: She exhibits no edema.   Lymphadenopathy:     She has no cervical adenopathy.   Neurological: She is alert and oriented to person, place, and time.   Skin: Skin is warm and dry.   Psychiatric: She has a normal mood and affect. Her behavior is normal. Judgment and thought content normal.       Assessment:       1. Essential hypertension    2. Coronary artery disease involving native coronary artery of native heart without angina pectoris    3. Hyperlipidemia, unspecified hyperlipidemia type    4. Hyperglycemia    5. Mild vitamin D deficiency    6. CVD (cardiovascular disease)        Plan:   Essential hypertension  -     CBC auto differential; Future; Expected date: 06/19/2017  -     Comprehensive  metabolic panel; Future; Expected date: 06/19/2017  -     TSH; Future; Expected date: 06/19/2017  -     Urinalysis  -     Microalbumin/creatinine urine ratio  Controlled - continue current meds    Coronary artery disease involving native coronary artery of native heart without angina pectoris  asymptomatic  Hyperlipidemia, unspecified hyperlipidemia type  -     Lipid panel; Future; Expected date: 06/19/2017    Hyperglycemia  -     Hemoglobin A1c; Future; Expected date: 06/19/2017    Mild vitamin D deficiency  -     Vitamin D; Future; Expected date: 06/19/2017    CVD (cardiovascular disease)  -     US Carotid Bilateral; Future; Expected date: 06/19/2017    Other orders  -     Cancel: Hemoglobin A1c; Future; Expected date: 06/05/2017  -     Pneumococcal Polysaccharide Vaccine (23 Valent) (SQ/IM)  -     alprazolam (XANAX) 0.25 MG tablet; Half to one tablet preflight  Dispense: 20 tablet; Refill: 0  -     rosuvastatin (CRESTOR) 20 MG tablet; Take 1 tablet (20 mg total) by mouth once daily.  Dispense: 90 tablet; Refill: 3  -     ramipril (ALTACE) 10 MG capsule; Take 1 capsule (10 mg total) by mouth 2 (two) times daily.  Dispense: 180 capsule; Refill: 3  -     omeprazole (PRILOSEC) 20 MG capsule; Take 1 capsule (20 mg total) by mouth once daily.  Dispense: 90 capsule; Refill: 3  -     mirtazapine (REMERON) 15 MG tablet; Take 1 tablet (15 mg total) by mouth every evening.  Dispense: 90 tablet; Refill: 1  -     metoprolol tartrate (LOPRESSOR) 50 MG tablet; Take 1 tablet (50 mg total) by mouth 2 (two) times daily.  Dispense: 180 tablet; Refill: 3  -     amlodipine (NORVASC) 2.5 MG tablet; Take 1 tablet (2.5 mg total) by mouth once daily.  Dispense: 90 tablet; Refill: 3

## 2017-07-06 DIAGNOSIS — J20.9 BRONCHITIS, ACUTE, WITH BRONCHOSPASM: ICD-10-CM

## 2017-07-06 RX ORDER — ALBUTEROL SULFATE 90 UG/1
2 AEROSOL, METERED RESPIRATORY (INHALATION) 4 TIMES DAILY
Qty: 1 INHALER | Refills: 6 | Status: SHIPPED | OUTPATIENT
Start: 2017-07-06 | End: 2017-07-20

## 2017-07-06 NOTE — TELEPHONE ENCOUNTER
----- Message from Gumaro Griffinr sent at 7/6/2017 12:45 PM CDT -----  Contact: self/ [phone] cell  Type: Rx    Name of medication(s): albuterol 90 mcg/actuation inhaler    Is this a refill? New rx? refill    Who prescribed medication? Dr. Kenyon Lau    Pharmacy Name, Phone, & Location: [facility] in [place] on file    Comments: Pt is out of town and forgot the inhaler given to her by Dr. Lau.  She is having a hard time with her asthma and would like to request that a rx for an inhaler be sent in to the pharmacy.  The pharmacy states that they have sent a request with no result.  She would like a call back to discuss.  Please call and advise.    Thank you

## 2017-07-10 ENCOUNTER — TELEPHONE (OUTPATIENT)
Dept: INTERNAL MEDICINE | Facility: CLINIC | Age: 72
End: 2017-07-10

## 2017-07-10 NOTE — TELEPHONE ENCOUNTER
----- Message from Berna Small sent at 7/6/2017 10:13 AM CDT -----  Contact: pharmacy, darcie/[phone]  Pharmacy called in regards to the pt being out of town in [place] and forgot her Rx for albuterol 90 mcg/actuation inhaler at home. She need a Rx call into the pharmacy.      [facility] store [address] [place] [address]   Please advise

## 2017-07-19 ENCOUNTER — PATIENT MESSAGE (OUTPATIENT)
Dept: INTERNAL MEDICINE | Facility: CLINIC | Age: 72
End: 2017-07-19

## 2017-07-19 ENCOUNTER — HOSPITAL ENCOUNTER (OUTPATIENT)
Dept: RADIOLOGY | Facility: HOSPITAL | Age: 72
Discharge: HOME | End: 2017-07-19
Attending: INTERNAL MEDICINE
Payer: MEDICARE

## 2017-07-19 DIAGNOSIS — I25.10 CVD (CARDIOVASCULAR DISEASE): ICD-10-CM

## 2017-07-19 PROCEDURE — 93880 EXTRACRANIAL BILAT STUDY: CPT | Mod: 26,,, | Performed by: RADIOLOGY

## 2017-07-19 PROCEDURE — 93880 EXTRACRANIAL BILAT STUDY: CPT | Mod: TC

## 2017-08-20 ENCOUNTER — PATIENT MESSAGE (OUTPATIENT)
Dept: INTERNAL MEDICINE | Facility: CLINIC | Age: 72
End: 2017-08-20

## 2017-08-23 ENCOUNTER — OFFICE VISIT (OUTPATIENT)
Dept: INTERNAL MEDICINE | Facility: CLINIC | Age: 72
End: 2017-08-23
Payer: MEDICARE

## 2017-08-23 ENCOUNTER — TELEPHONE (OUTPATIENT)
Dept: ORTHOPEDICS | Facility: CLINIC | Age: 72
End: 2017-08-23

## 2017-08-23 ENCOUNTER — HOSPITAL ENCOUNTER (OUTPATIENT)
Dept: RADIOLOGY | Facility: HOSPITAL | Age: 72
Discharge: HOME | End: 2017-08-23
Attending: PHYSICIAN ASSISTANT
Payer: MEDICARE

## 2017-08-23 ENCOUNTER — HOSPITAL ENCOUNTER (OUTPATIENT)
Dept: VASCULAR SURGERY | Facility: CLINIC | Age: 72
Discharge: HOME | End: 2017-08-23
Attending: PHYSICIAN ASSISTANT

## 2017-08-23 ENCOUNTER — TELEPHONE (OUTPATIENT)
Dept: INTERNAL MEDICINE | Facility: CLINIC | Age: 72
End: 2017-08-23

## 2017-08-23 VITALS
SYSTOLIC BLOOD PRESSURE: 164 MMHG | OXYGEN SATURATION: 97 % | HEART RATE: 56 BPM | DIASTOLIC BLOOD PRESSURE: 70 MMHG | HEIGHT: 67 IN | BODY MASS INDEX: 22.7 KG/M2 | WEIGHT: 144.63 LBS
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
## 2017-08-23 DIAGNOSIS — M25.552 LEFT HIP PAIN: ICD-10-CM

## 2017-08-23 DIAGNOSIS — M79.652 LEFT THIGH PAIN: ICD-10-CM

## 2017-08-23 DIAGNOSIS — S32.592A PUBIC RAMUS FRACTURE, LEFT, CLOSED, INITIAL ENCOUNTER: Primary | ICD-10-CM

## 2017-08-23 PROCEDURE — 73502 X-RAY EXAM HIP UNI 2-3 VIEWS: CPT | Mod: TC,LT

## 2017-08-23 PROCEDURE — 99214 OFFICE O/P EST MOD 30 MIN: CPT | Mod: S$GLB,,, | Performed by: PHYSICIAN ASSISTANT

## 2017-08-23 PROCEDURE — 3008F BODY MASS INDEX DOCD: CPT | Mod: S$GLB,,, | Performed by: PHYSICIAN ASSISTANT

## 2017-08-23 PROCEDURE — 3078F DIAST BP <80 MM HG: CPT | Mod: S$GLB,,, | Performed by: PHYSICIAN ASSISTANT

## 2017-08-23 PROCEDURE — 73502 X-RAY EXAM HIP UNI 2-3 VIEWS: CPT | Mod: 26,LT,, | Performed by: RADIOLOGY

## 2017-08-23 PROCEDURE — 1159F MED LIST DOCD IN RCRD: CPT | Mod: S$GLB,,, | Performed by: PHYSICIAN ASSISTANT

## 2017-08-23 PROCEDURE — 1125F AMNT PAIN NOTED PAIN PRSNT: CPT | Mod: S$GLB,,, | Performed by: PHYSICIAN ASSISTANT

## 2017-08-23 PROCEDURE — 99499 UNLISTED E&M SERVICE: CPT | Mod: S$PBB,,, | Performed by: PHYSICIAN ASSISTANT

## 2017-08-23 PROCEDURE — 73552 X-RAY EXAM OF FEMUR 2/>: CPT | Mod: 26,LT,, | Performed by: RADIOLOGY

## 2017-08-23 PROCEDURE — 73552 X-RAY EXAM OF FEMUR 2/>: CPT | Mod: TC,LT

## 2017-08-23 PROCEDURE — 3077F SYST BP >= 140 MM HG: CPT | Mod: S$GLB,,, | Performed by: PHYSICIAN ASSISTANT

## 2017-08-23 PROCEDURE — 99999 PR PBB SHADOW E&M-EST. PATIENT-LVL V: CPT | Mod: PBBFAC,,, | Performed by: PHYSICIAN ASSISTANT

## 2017-08-23 RX ORDER — TRAMADOL HYDROCHLORIDE 50 MG/1
50 TABLET ORAL EVERY 6 HOURS PRN
Qty: 30 TABLET | Refills: 0 | Status: SHIPPED | OUTPATIENT
Start: 2017-08-23 | End: 2017-08-23 | Stop reason: SDUPTHER

## 2017-08-23 RX ORDER — TRAMADOL HYDROCHLORIDE 50 MG/1
50 TABLET ORAL EVERY 6 HOURS PRN
Qty: 30 TABLET | Refills: 0 | Status: SHIPPED | OUTPATIENT
Start: 2017-08-23 | End: 2018-01-26

## 2017-08-23 NOTE — TELEPHONE ENCOUNTER
Ortho Telephone Triage Call  [phone]  Caller: SIVA Valero PA-C/Int. Medicine requesting Ortho appt today for pt for pelvic fracture.  HX: fracture L superior and inferioir pubic ramus and left pubic bone   Resolution: PERLA Jeong PA-C notified and xrays reviewed. First available Ortho  appt scheduled tomorrow, 8/24/17, at 1:00pm with arrival at 12:45pm. . Instructs that pt use walker and  NWB on LLE. SIVA Valero PA-C notified of same- who states that she has consulted with PERLA Jeong PA-C - and will notify and instruct pt of same. Pt active in [facility] for additional appt information.

## 2017-08-23 NOTE — TELEPHONE ENCOUNTER
The pt stated that she was having trouble with her insurance and will pay for the walker herself.      AU[place],

## 2017-08-23 NOTE — TELEPHONE ENCOUNTER
----- Message from Frances Buenrostro sent at 8/23/2017  3:24 PM CDT -----  Contact: self/[phone]  Patient called in regards needing to talk with Miss Valero about patient fracture pelvic. Please call and advise.         Thank you!!!

## 2017-08-23 NOTE — PATIENT INSTRUCTIONS
Do not take mirtazapine or xanax while using tramadol    Use walker for weight bearing    See orthopedics tomorrow

## 2017-08-23 NOTE — PROGRESS NOTES
"Subjective:       Patient ID: Jacqueline O Favret is a 72 y.o. female.        Chief Complaint: Fall (L leg pain=8)    Jacqueline O Favret is an established patient of Kristina Orozco MD here today for urgent care visit.    8/12/17-tour of [place].  Lots of walking through rugged terrain.  8/15/17, slipped on a rock, and fell forward onto abdomen/legs/hands/arms.  Did not hit her head or have any LOC.  She notes no abrasions or bruises from the fall.  Later that afternoon she began getting pain in the left thigh.  She pushed herself to keep going as she was on a very [place] trip.  She was in severe pain and could "barely walk."  Once she started walking the pain did improve somewhat.  Several days later, the weather was cold/damp and her pain was worse.  This caused her to not do any further hiking.  She got home yesterday evening.  Pain level is 7-8/10.  Using Aleve.             Review of Systems   Constitutional: Negative for chills, diaphoresis, fatigue and fever.   HENT: Negative for congestion and sore throat.    Eyes: Negative for visual disturbance.   Respiratory: Negative for cough, chest tightness and shortness of breath.    Cardiovascular: Negative for chest pain, palpitations and leg swelling.   Gastrointestinal: Negative for abdominal pain, blood in stool, constipation, diarrhea, nausea and vomiting.   Genitourinary: Negative for dysuria, frequency, hematuria and urgency.   Musculoskeletal: Positive for arthralgias. Negative for back pain.   Skin: Negative for rash.   Neurological: Negative for dizziness, syncope, weakness and headaches.   Psychiatric/Behavioral: Negative for dysphoric mood and sleep disturbance. The patient is not nervous/anxious.        Objective:      Physical Exam   Constitutional: She appears well-developed and well-nourished. No distress.   HENT:   Head: Normocephalic and atraumatic.   Right Ear: External ear normal.   Left Ear: External ear normal.   Neck: Neck supple. " "  Pulmonary/Chest: Effort normal.   Abdominal: Soft.   Musculoskeletal: She exhibits no edema.        Left hip: She exhibits decreased range of motion (pain with external rotation) and tenderness. She exhibits normal strength.   Neurological: She is alert.   Skin: Skin is warm and dry. She is not diaphoretic.   Psychiatric: She has a normal mood and affect.       Assessment:       1. Pubic ramus fracture, left, closed, initial encounter    2. Fall, initial encounter    3. Left thigh pain    4. Left hip pain        Plan:       Marli was seen today for fall.    Diagnoses and all orders for this visit:    Pubic ramus fracture, left, closed, initial encounter.  Tramadol prn pain (instructed not to use mirtazapine or xanax while on tramadol).  Rx for walker given.  To see ortho tomorrow.    -     Ambulatory consult to Orthopedics    Fall, initial encounter  -     X-Ray Hip 2 or 3 views Left; Future  -     X-Ray Femur 2 View Left; Future    Left thigh pain  -     X-Ray Femur 2 View Left; Future  -     VAS US Venous Leg Left; Future-cancelled given fracture which is cause of pain    Left hip pain  -     X-Ray Hip 2 or 3 views Left; Future    X-ray showin views: There is a fracture of the left superior and inferior pubic ramus and pubic bone.    Spoke with Maday Jeong in orthopedics who recommends walker for weight bearing and to be seen in orthopedics tomorrow.      Pt has been given instructions populated from [employer] database and has verbalized understanding of the after visit summary and information contained wherein.    Follow up with a primary care provider. May go to ER for acute shortness of breath, lightheadedness, fever, or any other emergent complaints or changes in condition.    "This note will be shared with the patient"    Future Appointments  Date Time Provider Department Center   2017 1:00 PM Maday Jeong PA-C [facility] ORTHO [facility]   2017 9:15 AM Jeromy Goodman OD [facility] OPTOMTY [facility]ado "   9/13/2017 10:00 AM Ladonna Davalos MD [facility] DERM [place]

## 2017-08-24 ENCOUNTER — OFFICE VISIT (OUTPATIENT)
Dept: ORTHOPEDICS | Facility: CLINIC | Age: 72
End: 2017-08-24
Payer: MEDICARE

## 2017-08-24 DIAGNOSIS — S32.9XXA CLOSED DISPLACED FRACTURE OF PELVIS, UNSPECIFIED PART OF PELVIS, INITIAL ENCOUNTER: Primary | ICD-10-CM

## 2017-08-24 PROCEDURE — 99203 OFFICE O/P NEW LOW 30 MIN: CPT | Mod: S$GLB,,, | Performed by: PHYSICIAN ASSISTANT

## 2017-08-24 PROCEDURE — 3008F BODY MASS INDEX DOCD: CPT | Mod: S$GLB,,, | Performed by: PHYSICIAN ASSISTANT

## 2017-08-24 PROCEDURE — 99999 PR PBB SHADOW E&M-EST. PATIENT-LVL III: CPT | Mod: PBBFAC,,, | Performed by: PHYSICIAN ASSISTANT

## 2017-08-24 PROCEDURE — 1159F MED LIST DOCD IN RCRD: CPT | Mod: S$GLB,,, | Performed by: PHYSICIAN ASSISTANT

## 2017-08-25 ENCOUNTER — TELEPHONE (OUTPATIENT)
Dept: ORTHOPEDICS | Facility: CLINIC | Age: 72
End: 2017-08-25

## 2017-08-25 NOTE — PROGRESS NOTES
Subjective:      Patient ID: Jacqueline O Favret is a 72 y.o. female.    Chief Complaint: No chief complaint on file.    HPI    Patient is a 72 year old female who presents to clinic for follow up from PCP for left superior and inferior pubic rami fracture that occurred on 08/15/2017 secondary to slipping on a rock and falling from standing height.  Patient has been weight bearing as tolerated without assistance. Patient seen by PCP yesterday at time x-ray taken. X-Ray: fracture of the left superior and inferior pubic ramus and pubic bone. Patient placed on walker. She is stating that her walker is helping a lot. Patient reports pain is controlled. She denied numbness or tingling.     Review of Systems   Constitution: Negative for chills and fever.   Cardiovascular: Negative for chest pain.   Respiratory: Negative for cough and shortness of breath.    Skin: Negative for color change, dry skin, itching, nail changes, poor wound healing and rash.   Musculoskeletal:        Left pubic fracture   Neurological: Negative for dizziness.   Psychiatric/Behavioral: Negative for altered mental status. The patient is not nervous/anxious.    All other systems reviewed and are negative.        Objective:            General    Constitutional: She is oriented to person, place, and time. She appears well-developed and well-nourished. No distress.   HENT:   Head: Atraumatic.   Eyes: Conjunctivae are normal.   Cardiovascular: Normal rate.    Pulmonary/Chest: Effort normal.   Neurological: She is alert and oriented to person, place, and time.   Psychiatric: She has a normal mood and affect. Her behavior is normal.         Left Hip Exam     Range of Motion   The patient has normal left hip ROM.    Comments:  Walked into clinic with walker,   full range of motion , though pain with range of motion, pain mainly in groin.             RADS: fracture of the left superior and inferior pubic ramus and pubic bone        Assessment:        Encounter Diagnosis   Name Primary?    Closed displaced fracture of pelvis, unspecified part of pelvis, initial encounter Yes          Plan:       Discussed treatment plan with patient. Patient is to be treated nonoperatively. Patient walked on fracture for 1 week before using walker. Fracture remained stable.   - weight bearing as tolerated, range of motion as tolerated with walker.   - Will call if PT is needed.   - Pain medication: no refill needed, prescription given by PCP  - return to clinic in 4 weeks at time x-ray of her pelvis AP inlet outlet needed.

## 2017-08-27 ENCOUNTER — OFFICE VISIT (OUTPATIENT)
Dept: URGENT CARE | Facility: CLINIC | Age: 72
End: 2017-08-27
Payer: MEDICARE

## 2017-08-27 VITALS
OXYGEN SATURATION: 93 % | BODY MASS INDEX: 22.6 KG/M2 | TEMPERATURE: 100 F | RESPIRATION RATE: 16 BRPM | SYSTOLIC BLOOD PRESSURE: 138 MMHG | DIASTOLIC BLOOD PRESSURE: 70 MMHG | HEIGHT: 67 IN | HEART RATE: 89 BPM | WEIGHT: 144 LBS

## 2017-08-27 DIAGNOSIS — J40 BRONCHITIS: Primary | ICD-10-CM

## 2017-08-27 PROCEDURE — 99213 OFFICE O/P EST LOW 20 MIN: CPT | Mod: S$GLB,,, | Performed by: PHYSICIAN ASSISTANT

## 2017-08-27 PROCEDURE — 3008F BODY MASS INDEX DOCD: CPT | Mod: S$GLB,,, | Performed by: PHYSICIAN ASSISTANT

## 2017-08-27 PROCEDURE — 3078F DIAST BP <80 MM HG: CPT | Mod: S$GLB,,, | Performed by: PHYSICIAN ASSISTANT

## 2017-08-27 PROCEDURE — 1159F MED LIST DOCD IN RCRD: CPT | Mod: S$GLB,,, | Performed by: PHYSICIAN ASSISTANT

## 2017-08-27 PROCEDURE — 3075F SYST BP GE 130 - 139MM HG: CPT | Mod: S$GLB,,, | Performed by: PHYSICIAN ASSISTANT

## 2017-08-27 RX ORDER — ALBUTEROL SULFATE 90 UG/1
1-2 AEROSOL, METERED RESPIRATORY (INHALATION) EVERY 4 HOURS PRN
Qty: 1 INHALER | Refills: 1 | Status: SHIPPED | OUTPATIENT
Start: 2017-08-27 | End: 2018-09-04 | Stop reason: SDUPTHER

## 2017-08-27 RX ORDER — AZITHROMYCIN 250 MG/1
250 TABLET, FILM COATED ORAL DAILY
Qty: 6 TABLET | Refills: 0 | Status: SHIPPED | OUTPATIENT
Start: 2017-08-27 | End: 2017-09-01

## 2017-08-27 NOTE — PROGRESS NOTES
"Subjective:       Patient ID: Jacqueline O Favret is a 72 y.o. female.    Vitals:  height is 5' 7" (1.702 m) and weight is 65.3 kg (144 lb). Her temperature is 99.5 °F (37.5 °C). Her blood pressure is 138/70 and her pulse is 89. Her respiration is 16 and oxygen saturation is 93% (abnormal).     Chief Complaint: Cough    This is a 72 y.o. female with Past Medical History:  4/6/2015: Abnormal liver enzymes  4/18/2013: Acute on chronic kidney disease, stage 3  8/16/2015: Alcohol-induced acute pancreatitis  No date: Anemia  12/14/2012: Bunion, right foot  11/14/2013: Compression fracture  1991: MI (myocardial infarction)  9/8/2015: PAF (paroxysmal atrial fibrillation)      Comment: During acute illness   05/11/2016: SCC (squamous cell carcinoma)      Comment: in situ left lower lip   who presents today with a chief complaint of a cough.         Cough   This is a new problem. The current episode started in the past 7 days. The problem has been rapidly worsening. The problem occurs constantly. The cough is productive of sputum. Associated symptoms include a fever, headaches, nasal congestion, postnasal drip, shortness of breath and wheezing. Pertinent negatives include no chest pain, chills, ear pain, eye redness, myalgias or sore throat. She has tried OTC cough suppressant for the symptoms. The treatment provided no relief. There is no history of bronchitis or pneumonia.     Review of Systems   Constitution: Positive for fever. Negative for chills and malaise/fatigue.   HENT: Positive for congestion, headaches and postnasal drip. Negative for ear pain, hoarse voice and sore throat.    Eyes: Negative for discharge and redness.   Cardiovascular: Negative for chest pain, dyspnea on exertion and leg swelling.   Respiratory: Positive for cough, shortness of breath, sputum production and wheezing.    Musculoskeletal: Negative for myalgias.   Gastrointestinal: Negative for abdominal pain and nausea.       Objective:    "   Physical Exam   Constitutional: She is oriented to person, place, and time. She appears well-developed and well-nourished.   HENT:   Head: Normocephalic and atraumatic.   Eyes: Conjunctivae are normal.   Neck: Normal range of motion. Neck supple. No thyromegaly present.   Cardiovascular: Normal rate and regular rhythm.  Exam reveals no gallop and no friction rub.    No murmur heard.  Pulmonary/Chest: Effort normal and breath sounds normal. She has no wheezes. She has no rales. She exhibits tenderness.   Musculoskeletal: Normal range of motion.   Lymphadenopathy:     She has no cervical adenopathy.   Neurological: She is alert and oriented to person, place, and time.   Skin: Skin is warm and dry. No rash noted. No erythema.   Psychiatric: She has a normal mood and affect. Her behavior is normal. Judgment and thought content normal.   Nursing note and vitals reviewed.      4:33 PM - Chest xray shows no infiltrate.    Assessment:       1. Bronchitis        Plan:         Bronchitis  -     X-Ray Chest PA And Lateral; Future; Expected date: 08/27/2017  -     azithromycin (Z-EMILY) 250 MG tablet; Take 1 tablet (250 mg total) by mouth once daily. Take 2 tablets on day 1.  Dispense: 6 tablet; Refill: 0  -     albuterol 90 mcg/actuation inhaler; Inhale 1-2 puffs into the lungs every 4 (four) hours as needed.  Dispense: 1 Inhaler; Refill: 1      Marli was seen today for cough.    Diagnoses and all orders for this visit:    Bronchitis  -     X-Ray Chest PA And Lateral; Future  -     azithromycin (Z-EMILY) 250 MG tablet; Take 1 tablet (250 mg total) by mouth once daily. Take 2 tablets on day 1.  -     albuterol 90 mcg/actuation inhaler; Inhale 1-2 puffs into the lungs every 4 (four) hours as needed.      Patient Instructions   - Rest.    - Drink plenty of fluids.    - Tylenol or Ibuprofen as directed as needed for fever/pain.    - Follow up with your PCP or specialty clinic as directed in the next 1-2 weeks if not improved or  as needed.  You can call [phone] to schedule an appointment with the appropriate provider.    - Go to the ED if your symptoms worsen.    Bronchitis, Antibiotic Treatment (Adult)    Bronchitis is an infection of the air passages (bronchial tubes) in your lungs. It often occurs when you have a cold. This illness is contagious during the first few days and is spread through the air by coughing and sneezing, or by direct contact (touching the sick person and then touching your own eyes, nose, or mouth).  Symptoms of bronchitis include cough with mucus (phlegm) and low-grade fever. Bronchitis usually lasts 7 to 14 days. Mild cases can be treated with simple home remedies. More severe infection is treated with an antibiotic.  Home care  Follow these guidelines when caring for yourself at home:  · If your symptoms are severe, rest at home for the first 2 to 3 days. When you go back to your usual activities, don't let yourself get too tired.  · Do not smoke. Also avoid being exposed to secondhand smoke.  · You may use over-the-counter medicines to control fever or pain, unless another medicine was prescribed. (Note: If you have chronic liver or kidney disease or have ever had a stomach ulcer or gastrointestinal bleeding, talk with your healthcare provider before using these medicines. Also talk to your provider if you are taking medicine to prevent blood clots.) Aspirin should never be given to anyone younger than 18 years of age who is ill with a viral infection or fever. It may cause severe liver or brain damage.  · Your appetite may be poor, so a light diet is fine. Avoid dehydration by drinking 6 to 8 glasses of fluids per day (such as water, soft drinks, sports drinks, juices, tea, or soup). Extra fluids will help loosen secretions in the nose and lungs.  · Over-the-counter cough, cold, and sore-throat medicines will not shorten the length of the illness, but they may be helpful to reduce symptoms. (Note: Do not  use decongestants if you have high blood pressure.)  · Finish all antibiotic medicine. Do this even if you are feeling better after only a few days.  Follow-up care  Follow up with your healthcare provider, or as advised. If you had an X-ray or ECG (electrocardiogram), a specialist will review it. You will be notified of any new findings that may affect your care.  Note: If you are age 65 or older, or if you have a chronic lung disease or condition that affects your immune system, or you smoke, talk to your healthcare provider about having pneumococcal vaccinations and a yearly influenza vaccination (flu shot).  When to seek medical advice  Call your healthcare provider right away if any of these occur:  · Fever of 100.4°F (38°C) or higher  · Coughing up increased amounts of colored sputum  · Weakness, drowsiness, headache, facial pain, ear pain, or a stiff neck  Call 911, or get immediate medical care  Contact emergency services right away if any of these occur.  · Coughing up blood  · Worsening weakness, drowsiness, headache, or stiff neck  · Trouble breathing, wheezing, or pain with breathing  Date Last Reviewed: 9/13/2015  © 4470-5327 [employer]. [address] [place] [address], [place] [address]. All rights reserved. This information is not intended as a substitute for professional medical care. Always follow your healthcare professional's instructions.

## 2017-08-27 NOTE — PATIENT INSTRUCTIONS
- Rest.    - Drink plenty of fluids.    - Tylenol or Ibuprofen as directed as needed for fever/pain.    - Follow up with your PCP or specialty clinic as directed in the next 1-2 weeks if not improved or as needed.  You can call [phone] to schedule an appointment with the appropriate provider.    - Go to the ED if your symptoms worsen.    Bronchitis, Antibiotic Treatment (Adult)    Bronchitis is an infection of the air passages (bronchial tubes) in your lungs. It often occurs when you have a cold. This illness is contagious during the first few days and is spread through the air by coughing and sneezing, or by direct contact (touching the sick person and then touching your own eyes, nose, or mouth).  Symptoms of bronchitis include cough with mucus (phlegm) and low-grade fever. Bronchitis usually lasts 7 to 14 days. Mild cases can be treated with simple home remedies. More severe infection is treated with an antibiotic.  Home care  Follow these guidelines when caring for yourself at home:  · If your symptoms are severe, rest at home for the first 2 to 3 days. When you go back to your usual activities, don't let yourself get too tired.  · Do not smoke. Also avoid being exposed to secondhand smoke.  · You may use over-the-counter medicines to control fever or pain, unless another medicine was prescribed. (Note: If you have chronic liver or kidney disease or have ever had a stomach ulcer or gastrointestinal bleeding, talk with your healthcare provider before using these medicines. Also talk to your provider if you are taking medicine to prevent blood clots.) Aspirin should never be given to anyone younger than 18 years of age who is ill with a viral infection or fever. It may cause severe liver or brain damage.  · Your appetite may be poor, so a light diet is fine. Avoid dehydration by drinking 6 to 8 glasses of fluids per day (such as water, soft drinks, sports drinks, juices, tea, or soup). Extra fluids will  help loosen secretions in the nose and lungs.  · Over-the-counter cough, cold, and sore-throat medicines will not shorten the length of the illness, but they may be helpful to reduce symptoms. (Note: Do not use decongestants if you have high blood pressure.)  · Finish all antibiotic medicine. Do this even if you are feeling better after only a few days.  Follow-up care  Follow up with your healthcare provider, or as advised. If you had an X-ray or ECG (electrocardiogram), a specialist will review it. You will be notified of any new findings that may affect your care.  Note: If you are age 65 or older, or if you have a chronic lung disease or condition that affects your immune system, or you smoke, talk to your healthcare provider about having pneumococcal vaccinations and a yearly influenza vaccination (flu shot).  When to seek medical advice  Call your healthcare provider right away if any of these occur:  · Fever of 100.4°F (38°C) or higher  · Coughing up increased amounts of colored sputum  · Weakness, drowsiness, headache, facial pain, ear pain, or a stiff neck  Call 911, or get immediate medical care  Contact emergency services right away if any of these occur.  · Coughing up blood  · Worsening weakness, drowsiness, headache, or stiff neck  · Trouble breathing, wheezing, or pain with breathing  Date Last Reviewed: 9/13/2015  © 1416-9169 [employer]. [address] [place] [address]. All rights reserved. This information is not intended as a substitute for professional medical care. Always follow your healthcare professional's instructions.

## 2017-08-28 ENCOUNTER — PATIENT MESSAGE (OUTPATIENT)
Dept: ORTHOPEDICS | Facility: CLINIC | Age: 72
End: 2017-08-28

## 2017-08-28 ENCOUNTER — TELEPHONE (OUTPATIENT)
Dept: ORTHOPEDICS | Facility: CLINIC | Age: 72
End: 2017-08-28

## 2017-08-28 DIAGNOSIS — S32.9XXA CLOSED DISPLACED FRACTURE OF PELVIS, UNSPECIFIED PART OF PELVIS, INITIAL ENCOUNTER: Primary | ICD-10-CM

## 2017-08-28 RX ORDER — CYCLOBENZAPRINE HCL 5 MG
5 TABLET ORAL 3 TIMES DAILY PRN
Qty: 30 TABLET | Refills: 0 | Status: SHIPPED | OUTPATIENT
Start: 2017-08-28 | End: 2017-09-26 | Stop reason: SDUPTHER

## 2017-08-29 ENCOUNTER — TELEPHONE (OUTPATIENT)
Dept: URGENT CARE | Facility: CLINIC | Age: 72
End: 2017-08-29

## 2017-09-07 RX ORDER — ALPRAZOLAM 0.25 MG/1
TABLET ORAL
Qty: 20 TABLET | Refills: 0 | Status: SHIPPED | OUTPATIENT
Start: 2017-09-07 | End: 2017-10-27 | Stop reason: SDUPTHER

## 2017-09-08 RX ORDER — ALPRAZOLAM 0.25 MG/1
TABLET ORAL
Qty: 20 TABLET | Refills: 0 | OUTPATIENT
Start: 2017-09-08

## 2017-09-13 ENCOUNTER — OFFICE VISIT (OUTPATIENT)
Dept: DERMATOLOGY | Facility: CLINIC | Age: 72
End: 2017-09-13
Payer: MEDICARE

## 2017-09-13 DIAGNOSIS — L90.5 SCAR: Primary | ICD-10-CM

## 2017-09-13 DIAGNOSIS — L82.1 SK (SEBORRHEIC KERATOSIS): ICD-10-CM

## 2017-09-13 DIAGNOSIS — Z85.828 PERSONAL HISTORY OF SKIN CANCER: ICD-10-CM

## 2017-09-13 DIAGNOSIS — D18.00 ANGIOMA: ICD-10-CM

## 2017-09-13 PROCEDURE — 99999 PR PBB SHADOW E&M-EST. PATIENT-LVL I: CPT | Mod: PBBFAC,,, | Performed by: DERMATOLOGY

## 2017-09-13 PROCEDURE — 99213 OFFICE O/P EST LOW 20 MIN: CPT | Mod: S$GLB,,, | Performed by: DERMATOLOGY

## 2017-09-13 PROCEDURE — 1159F MED LIST DOCD IN RCRD: CPT | Mod: S$GLB,,, | Performed by: DERMATOLOGY

## 2017-09-13 PROCEDURE — 3008F BODY MASS INDEX DOCD: CPT | Mod: S$GLB,,, | Performed by: DERMATOLOGY

## 2017-09-13 NOTE — PROGRESS NOTES
Subjective:       Patient ID:  Jacqueline O Favret is a 72 y.o. female who presents for   Chief Complaint   Patient presents with    Skin Check     Pt here today for a UBSE. Pt c/o lesion on right shoulder x a few weeks. No bleeding or pain. NO prev tx.  This is a high risk patient here to check for the development of new lesions.          Review of Systems   Skin: Positive for daily sunscreen use, activity-related sunscreen use and wears hat.   Hematologic/Lymphatic: Bruises/bleeds easily.        Objective:    Physical Exam   Constitutional: She appears well-developed and well-nourished. No distress.   Neurological: She is alert and oriented to person, place, and time. She is not disoriented.   Psychiatric: She has a normal mood and affect.   Skin:   Areas Examined (abnormalities noted in diagram):   Scalp / Hair Palpated and Inspected  Head / Face Inspection Performed  Neck Inspection Performed  Chest / Axilla Inspection Performed  Abdomen Inspection Performed  Back Inspection Performed  RUE Inspected  LUE Inspection Performed  Nails and Digits Inspection Performed                   Diagram Legend     Erythematous scaling macule/papule c/w actinic keratosis       Vascular papule c/w angioma      Pigmented verrucoid papule/plaque c/w seborrheic keratosis      Yellow umbilicated papule c/w sebaceous hyperplasia      Irregularly shaped tan macule c/w lentigo     1-2 mm smooth white papules consistent with Milia      Movable subcutaneous cyst with punctum c/w epidermal inclusion cyst      Subcutaneous movable cyst c/w pilar cyst      Firm pink to brown papule c/w dermatofibroma      Pedunculated fleshy papule(s) c/w skin tag(s)      Evenly pigmented macule c/w junctional nevus     Mildly variegated pigmented, slightly irregular-bordered macule c/w mildly atypical nevus      Flesh colored to evenly pigmented papule c/w intradermal nevus       Pink pearly papule/plaque c/w basal cell carcinoma      Erythematous  hyperkeratotic cursted plaque c/w SCC      Surgical scar with no sign of skin cancer recurrence      Open and closed comedones      Inflammatory papules and pustules      Verrucoid papule consistent consistent with wart     Erythematous eczematous patches and plaques     Dystrophic onycholytic nail with subungual debris c/w onychomycosis     Umbilicated papule    Erythematous-base heme-crusted tan verrucoid plaque consistent with inflamed seborrheic keratosis     Erythematous Silvery Scaling Plaque c/w Psoriasis     See annotation      Assessment / Plan:        Scar  Personal history of skin cancer  Area(s) of previous NMSC evaluated with no signs of recurrence.    Upper body skin examination performed today including at least 6 points as noted in physical examination. No lesions suspicious for malignancy noted.    Angioma  These are benign vascular lesions that are inherited.  Treatment is not necessary.    SK (seborrheic keratosis)  These are benign inherited growths without a malignant potential. Reassurance given to patient. No treatment is necessary.       Personal history of skin cancer  Today's Plan:        Return in about 1 year (around 9/13/2018).

## 2017-09-14 ENCOUNTER — HOSPITAL ENCOUNTER (OUTPATIENT)
Dept: RADIOLOGY | Facility: HOSPITAL | Age: 72
Discharge: HOME | End: 2017-09-14
Attending: ORTHOPAEDIC SURGERY
Payer: MEDICARE

## 2017-09-14 ENCOUNTER — OFFICE VISIT (OUTPATIENT)
Dept: ORTHOPEDICS | Facility: CLINIC | Age: 72
End: 2017-09-14
Payer: MEDICARE

## 2017-09-14 DIAGNOSIS — S32.9XXA CLOSED DISPLACED FRACTURE OF PELVIS, UNSPECIFIED PART OF PELVIS, INITIAL ENCOUNTER: ICD-10-CM

## 2017-09-14 DIAGNOSIS — S32.9XXD CLOSED DISPLACED FRACTURE OF PELVIS WITH ROUTINE HEALING, UNSPECIFIED PART OF PELVIS, SUBSEQUENT ENCOUNTER: Primary | ICD-10-CM

## 2017-09-14 PROCEDURE — 3008F BODY MASS INDEX DOCD: CPT | Mod: S$GLB,,, | Performed by: PHYSICIAN ASSISTANT

## 2017-09-14 PROCEDURE — 1159F MED LIST DOCD IN RCRD: CPT | Mod: S$GLB,,, | Performed by: PHYSICIAN ASSISTANT

## 2017-09-14 PROCEDURE — 99999 PR PBB SHADOW E&M-EST. PATIENT-LVL III: CPT | Mod: PBBFAC,,, | Performed by: PHYSICIAN ASSISTANT

## 2017-09-14 PROCEDURE — 72190 X-RAY EXAM OF PELVIS: CPT | Mod: TC

## 2017-09-14 PROCEDURE — 99213 OFFICE O/P EST LOW 20 MIN: CPT | Mod: S$GLB,,, | Performed by: PHYSICIAN ASSISTANT

## 2017-09-14 PROCEDURE — 72190 X-RAY EXAM OF PELVIS: CPT | Mod: 26,,, | Performed by: RADIOLOGY

## 2017-09-14 RX ORDER — OXYCODONE AND ACETAMINOPHEN 5; 325 MG/1; MG/1
1 TABLET ORAL
Qty: 42 TABLET | Refills: 0 | Status: SHIPPED | OUTPATIENT
Start: 2017-09-14 | End: 2018-01-05

## 2017-09-14 NOTE — PROGRESS NOTES
Subjective:      Patient ID: Jacqueline O Favret is a 72 y.o. female.    Chief Complaint: No chief complaint on file.    HPI    Patient is a 72 year old female who presents to clinic for follow up for left superior and inferior pubic rami fracture that occurred on 08/15/2017 secondary to slipping on a rock and falling from standing height.  Patient has been weight bearing as tolerated without assistance.  Patient reports pain is controlled.  She stated that she is pain free. She will be going on a trip out of country and is concerned for increased pain. She request pain prescription for possible increased pain.  She denied numbness or tingling.     Review of Systems   Constitution: Negative for chills and fever.   Cardiovascular: Negative for chest pain.   Respiratory: Negative for cough and shortness of breath.    Skin: Negative for color change, dry skin, itching, nail changes, poor wound healing and rash.   Musculoskeletal:        Left pubic fracture   Neurological: Negative for dizziness.   Psychiatric/Behavioral: Negative for altered mental status. The patient is not nervous/anxious.    All other systems reviewed and are negative.        Objective:                    Left Hip Exam     Range of Motion   The patient has normal left hip ROM.    Muscle Strength   The patient has normal left hip strength.     Comments:  No pain with range of motion.             RADS: fracture of the left superior and inferior pubic ramus and pubic bone        Assessment:       Encounter Diagnosis   Name Primary?    Closed displaced fracture of pelvis with routine healing, unspecified part of pelvis, subsequent encounter Yes          Plan:       Discussed treatment plan with patient. Patient is to be treated nonoperatively.   - weight bearing as tolerated, range of motion as tolerated   - Will call if PT is needed.   - Pain medication: refilled  - return to clinic in 6 weeks at time x-ray of her pelvis AP inlet outlet needed.

## 2017-09-27 RX ORDER — CYCLOBENZAPRINE HCL 5 MG
5 TABLET ORAL 3 TIMES DAILY PRN
Qty: 30 TABLET | Refills: 0 | Status: SHIPPED | OUTPATIENT
Start: 2017-09-27 | End: 2017-10-07

## 2017-10-28 RX ORDER — ALPRAZOLAM 0.25 MG/1
TABLET ORAL
Qty: 20 TABLET | Refills: 0 | Status: SHIPPED | OUTPATIENT
Start: 2017-10-28 | End: 2017-12-01 | Stop reason: SDUPTHER

## 2017-11-02 ENCOUNTER — OFFICE VISIT (OUTPATIENT)
Dept: OPTOMETRY | Facility: CLINIC | Age: 72
End: 2017-11-02
Payer: MEDICARE

## 2017-11-02 ENCOUNTER — OFFICE VISIT (OUTPATIENT)
Dept: ORTHOPEDICS | Facility: CLINIC | Age: 72
End: 2017-11-02
Payer: MEDICARE

## 2017-11-02 ENCOUNTER — HOSPITAL ENCOUNTER (OUTPATIENT)
Dept: RADIOLOGY | Facility: HOSPITAL | Age: 72
Discharge: HOME | End: 2017-11-02
Attending: PHYSICIAN ASSISTANT
Payer: MEDICARE

## 2017-11-02 VITALS — BODY MASS INDEX: 21.19 KG/M2 | WEIGHT: 135 LBS | HEIGHT: 67 IN

## 2017-11-02 DIAGNOSIS — I10 ESSENTIAL HYPERTENSION: ICD-10-CM

## 2017-11-02 DIAGNOSIS — Z13.5 SCREENING FOR EYE CONDITION: ICD-10-CM

## 2017-11-02 DIAGNOSIS — H52.203 HYPEROPIA WITH ASTIGMATISM AND PRESBYOPIA, BILATERAL: ICD-10-CM

## 2017-11-02 DIAGNOSIS — S32.9XXD CLOSED DISPLACED FRACTURE OF PELVIS WITH ROUTINE HEALING, UNSPECIFIED PART OF PELVIS, SUBSEQUENT ENCOUNTER: Primary | ICD-10-CM

## 2017-11-02 DIAGNOSIS — H25.13 NUCLEAR SCLEROSIS, BILATERAL: Primary | ICD-10-CM

## 2017-11-02 DIAGNOSIS — H52.03 HYPEROPIA WITH ASTIGMATISM AND PRESBYOPIA, BILATERAL: ICD-10-CM

## 2017-11-02 DIAGNOSIS — S32.9XXD CLOSED DISPLACED FRACTURE OF PELVIS WITH ROUTINE HEALING, UNSPECIFIED PART OF PELVIS, SUBSEQUENT ENCOUNTER: ICD-10-CM

## 2017-11-02 DIAGNOSIS — H52.4 HYPEROPIA WITH ASTIGMATISM AND PRESBYOPIA, BILATERAL: ICD-10-CM

## 2017-11-02 DIAGNOSIS — Z87.81 HISTORY OF REDUCTION OF ORBITAL FRACTURE: ICD-10-CM

## 2017-11-02 DIAGNOSIS — Z98.890 HISTORY OF REDUCTION OF ORBITAL FRACTURE: ICD-10-CM

## 2017-11-02 DIAGNOSIS — Z46.0 ENCOUNTER FOR FITTING OR ADJUSTMENT OF SPECTACLES OR CONTACT LENSES: Primary | ICD-10-CM

## 2017-11-02 PROCEDURE — 99213 OFFICE O/P EST LOW 20 MIN: CPT | Mod: S$GLB,,, | Performed by: PHYSICIAN ASSISTANT

## 2017-11-02 PROCEDURE — 99499 UNLISTED E&M SERVICE: CPT | Mod: S$PBB,,, | Performed by: OPTOMETRIST

## 2017-11-02 PROCEDURE — 92310 CONTACT LENS FITTING OU: CPT | Mod: ,,, | Performed by: OPTOMETRIST

## 2017-11-02 PROCEDURE — 92015 DETERMINE REFRACTIVE STATE: CPT | Mod: S$GLB,,, | Performed by: OPTOMETRIST

## 2017-11-02 PROCEDURE — 99499 UNLISTED E&M SERVICE: CPT | Mod: S$GLB,,, | Performed by: OPTOMETRIST

## 2017-11-02 PROCEDURE — 99999 PR PBB SHADOW E&M-EST. PATIENT-LVL II: CPT | Mod: PBBFAC,,, | Performed by: OPTOMETRIST

## 2017-11-02 PROCEDURE — 92014 COMPRE OPH EXAM EST PT 1/>: CPT | Mod: S$GLB,,, | Performed by: OPTOMETRIST

## 2017-11-02 PROCEDURE — 99999 PR PBB SHADOW E&M-EST. PATIENT-LVL III: CPT | Mod: PBBFAC,,, | Performed by: PHYSICIAN ASSISTANT

## 2017-11-02 PROCEDURE — 72190 X-RAY EXAM OF PELVIS: CPT | Mod: TC

## 2017-11-02 PROCEDURE — 72190 X-RAY EXAM OF PELVIS: CPT | Mod: 26,,, | Performed by: RADIOLOGY

## 2017-11-02 NOTE — PATIENT INSTRUCTIONS
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  No need for cataract surgery in either eye, based on the correctable visual acuity in each eye today    History of orbital fracture on the right side, with surgical repair.  S/P blepharoplasty on the right side (Dr. Gao).    Intraocular pressure indicates IOP within normal range OD and OS   Dilated fundus exam not done today per Ms. Favret's request.   Defer to later date.    Hyperopia with astigmatism in each eye, with satisfactory correctable VA in each eye.  Presbyopia consistent with age.  New spectacle lens Rx issued for use as desired.    Good contact lens fit with present monovision SCLs.  Wearing CLs well in each eye.  Happy with monovision effect.  Power of each CL okay as is (right lens for distance and left lens for near).     Repeat refraction in one year.

## 2017-11-02 NOTE — PROGRESS NOTES
HPI     Patient in today for contact lens follow-up with general eye examination.    Refer to additional patient encounter notes dated 11/02/2017.       Last edited by Jeromy Goodman, OD on 11/2/2017  9:28 AM. (History)            Assessment /Plan     For exam results, see Encounter Report.    1. Encounter for fitting or adjustment of spectacles or contact lenses                      Prior diagnosis of nuclear sclerotic/cortical cataract in both eyes.  No need for cataract surgery in either eye, based on the correctable visual acuity in each eye today    History of orbital fracture on the right side, with surgical repair.  S/P blepharoplasty on the right side (Dr. Gao).    Intraocular pressure indicates IOP within normal range OD and OS   Dilated fundus exam not done today per Ms. Favret's request.   Defer to later date.    Hyperopia with astigmatism in each eye, with satisfactory correctable VA in each eye.  Presbyopia consistent with age.  New spectacle lens Rx issued for use as desired.    Good contact lens fit with present monovision SCLs.  Wearing CLs well in each eye.  Happy with monovision effect.  Power of each CL okay as is (right lens for distance and left lens for near).     Repeat refraction in one year.

## 2017-11-02 NOTE — PROGRESS NOTES
Subjective:      Patient ID: Jacqueline O Favret is a 72 y.o. female.    Chief Complaint: No chief complaint on file.    HPI    Patient is a 72 year old female who presents to clinic for follow up for left superior and inferior pubic rami fracture that occurred on 08/15/2017 secondary to slipping on a rock and falling from standing height.  Patient has been weight bearing as tolerated without assistance.  Patient reports pain is controlled.  She stated that she is pain free.She denied numbness or tingling.     Review of Systems   Skin: Negative for color change.         Objective:              Left Hip Exam     Range of Motion   The patient has normal left hip ROM.    Muscle Strength   The patient has normal left hip strength.     Comments:  No pain with range of motion.             RADS: fracture of the left superior and inferior pubic ramus and pubic bone        Assessment:       Encounter Diagnosis   Name Primary?    Closed displaced fracture of pelvis with routine healing, unspecified part of pelvis, subsequent encounter Yes          Plan:       Discussed treatment plan with patient. Patient is to be treated nonoperatively.   - weight bearing as tolerated, range of motion as tolerated   - Will call if PT is needed.   - Pain medication: no refill needed  - return to clinic in 6 weeks if any increase in pain at time x-ray of her pelvis AP inlet outlet needed.

## 2017-11-14 ENCOUNTER — OFFICE VISIT (OUTPATIENT)
Dept: OPTOMETRY | Facility: CLINIC | Age: 72
End: 2017-11-14
Payer: MEDICARE

## 2017-11-14 DIAGNOSIS — Z87.81 HISTORY OF REDUCTION OF ORBITAL FRACTURE: ICD-10-CM

## 2017-11-14 DIAGNOSIS — H25.13 NUCLEAR SCLEROSIS, BILATERAL: Primary | ICD-10-CM

## 2017-11-14 DIAGNOSIS — Z98.890 HISTORY OF REDUCTION OF ORBITAL FRACTURE: ICD-10-CM

## 2017-11-14 DIAGNOSIS — Z13.5 SCREENING FOR EYE CONDITION: ICD-10-CM

## 2017-11-14 DIAGNOSIS — I10 ESSENTIAL HYPERTENSION: ICD-10-CM

## 2017-11-14 PROCEDURE — 99999 PR PBB SHADOW E&M-EST. PATIENT-LVL II: CPT | Mod: PBBFAC,,, | Performed by: OPTOMETRIST

## 2017-11-14 PROCEDURE — 99499 UNLISTED E&M SERVICE: CPT | Mod: S$PBB,,, | Performed by: OPTOMETRIST

## 2017-11-14 PROCEDURE — 99499 UNLISTED E&M SERVICE: CPT | Mod: S$GLB,,, | Performed by: OPTOMETRIST

## 2017-11-14 NOTE — PROGRESS NOTES
HPI     Concerns About Ocular Health    Additional comments: DFE            Comments   Patient is in for a f/u and DFE only . Patient has no complaints or vision   at this time.        Last edited by Mi Nassar on 11/14/2017  8:32 AM. (History)            Assessment /Plan     For exam results, see Encounter Report.    1. Nuclear sclerosis, bilateral     2. Essential hypertension     3. Screening for eye condition     4. History of reduction of orbital fracture                    Bilateral nuclear sclerosis of lens, consistent with age.  Early cortical cataract in both eyes, more apparent in the right eye.  Slight asymmetry in the cupping of the optic nerve head, greater in the right eye.  Optic nerve appears healthy in both eyes, and intraocular pressure well within normal range (and equal) on last visit.  Monitor only/.  Otherwise, unremarkable in both eyes.  Recheck in one year, as previously advised.

## 2017-11-14 NOTE — PATIENT INSTRUCTIONS
Bilateral nuclear sclerosis of lens, consistent with age.  Early cortical cataract in both eyes, more apparent in the right eye.  Slight asymmetry in the cupping of the optic nerve head, greater in the right eye.  Optic nerve appears healthy in both eyes, and intraocular pressure well within normal range (and equal) on last visit.  Monitor only/.  Otherwise, unremarkable in both eyes.  Recheck in one year, as previously advised.

## 2017-11-28 RX ORDER — ALPRAZOLAM 0.25 MG/1
TABLET ORAL
Qty: 20 TABLET | Refills: 0 | Status: CANCELLED | OUTPATIENT
Start: 2017-11-28

## 2017-12-01 NOTE — TELEPHONE ENCOUNTER
"----- Message from lAice Becerra sent at 12/1/2017 12:06 PM CST -----  Contact: [facility] [phone]  RX request - refill or new RX.  Is this a refill or new RX:  refill  RX name and strength: Xanax 0.25  Directions:   Is this a 30 day or 90 day RX:    Pharmacy name and phone # (DON'T enter "on file" or "in chart"): [facility] [phone]  Comments:            "

## 2017-12-04 NOTE — TELEPHONE ENCOUNTER
----- Message from Berna Small sent at 12/4/2017 12:30 PM CST -----  Contact: self/[phone]  Pt called in regards to getting a Rx refill for ALPRAZolam (XANAX) 0.25 MG tablet.        [facility] pharmacy  Please advise

## 2017-12-06 RX ORDER — ALPRAZOLAM 0.25 MG/1
TABLET ORAL
Qty: 20 TABLET | Refills: 0 | OUTPATIENT
Start: 2017-12-06

## 2017-12-06 RX ORDER — ALPRAZOLAM 0.25 MG/1
TABLET ORAL
Qty: 20 TABLET | Refills: 0 | Status: SHIPPED | OUTPATIENT
Start: 2017-12-06 | End: 2018-01-23 | Stop reason: SDUPTHER

## 2018-01-05 ENCOUNTER — OFFICE VISIT (OUTPATIENT)
Dept: URGENT CARE | Facility: CLINIC | Age: 73
End: 2018-01-05
Payer: MEDICARE

## 2018-01-05 VITALS
WEIGHT: 132 LBS | DIASTOLIC BLOOD PRESSURE: 68 MMHG | OXYGEN SATURATION: 92 % | HEART RATE: 108 BPM | BODY MASS INDEX: 20.72 KG/M2 | SYSTOLIC BLOOD PRESSURE: 108 MMHG | TEMPERATURE: 99 F | HEIGHT: 67 IN

## 2018-01-05 DIAGNOSIS — J40 BRONCHITIS: Primary | ICD-10-CM

## 2018-01-05 DIAGNOSIS — J98.01 BRONCHOSPASM, ACUTE: ICD-10-CM

## 2018-01-05 LAB
CTP QC/QA: YES
FLUAV AG NPH QL: NEGATIVE
FLUBV AG NPH QL: NEGATIVE

## 2018-01-05 PROCEDURE — 94640 AIRWAY INHALATION TREATMENT: CPT | Mod: S$GLB,,, | Performed by: EMERGENCY MEDICINE

## 2018-01-05 PROCEDURE — 96372 THER/PROPH/DIAG INJ SC/IM: CPT | Mod: 59,S$GLB,, | Performed by: EMERGENCY MEDICINE

## 2018-01-05 PROCEDURE — 99214 OFFICE O/P EST MOD 30 MIN: CPT | Mod: 25,S$GLB,, | Performed by: PHYSICIAN ASSISTANT

## 2018-01-05 PROCEDURE — 87804 INFLUENZA ASSAY W/OPTIC: CPT | Mod: QW,S$GLB,, | Performed by: PHYSICIAN ASSISTANT

## 2018-01-05 RX ORDER — ALBUTEROL SULFATE 90 UG/1
2 AEROSOL, METERED RESPIRATORY (INHALATION) EVERY 6 HOURS PRN
Qty: 18 G | Refills: 0 | Status: CANCELLED | OUTPATIENT
Start: 2018-01-05 | End: 2019-01-05

## 2018-01-05 RX ORDER — METHYLPREDNISOLONE 4 MG/1
TABLET ORAL
Qty: 1 PACKAGE | Refills: 0 | Status: SHIPPED | OUTPATIENT
Start: 2018-01-06 | End: 2018-01-26

## 2018-01-05 RX ORDER — IPRATROPIUM BROMIDE 0.5 MG/2.5ML
0.5 SOLUTION RESPIRATORY (INHALATION)
Status: COMPLETED | OUTPATIENT
Start: 2018-01-05 | End: 2018-01-05

## 2018-01-05 RX ORDER — DOXYCYCLINE HYCLATE 100 MG/1
100 TABLET, DELAYED RELEASE ORAL 2 TIMES DAILY
Qty: 20 TABLET | Refills: 0 | Status: SHIPPED | OUTPATIENT
Start: 2018-01-05 | End: 2018-01-15

## 2018-01-05 RX ORDER — BETAMETHASONE SODIUM PHOSPHATE AND BETAMETHASONE ACETATE 3; 3 MG/ML; MG/ML
9 INJECTION, SUSPENSION INTRA-ARTICULAR; INTRALESIONAL; INTRAMUSCULAR; SOFT TISSUE ONCE
Status: COMPLETED | OUTPATIENT
Start: 2018-01-05 | End: 2018-01-05

## 2018-01-05 RX ORDER — BENZONATATE 200 MG/1
200 CAPSULE ORAL 3 TIMES DAILY PRN
Qty: 30 CAPSULE | Refills: 0 | Status: SHIPPED | OUTPATIENT
Start: 2018-01-05 | End: 2018-01-15

## 2018-01-05 RX ORDER — ALBUTEROL SULFATE 0.83 MG/ML
2.5 SOLUTION RESPIRATORY (INHALATION)
Status: COMPLETED | OUTPATIENT
Start: 2018-01-05 | End: 2018-01-05

## 2018-01-05 RX ORDER — PROMETHAZINE HYDROCHLORIDE AND DEXTROMETHORPHAN HYDROBROMIDE 6.25; 15 MG/5ML; MG/5ML
5 SYRUP ORAL EVERY 6 HOURS PRN
Qty: 118 ML | Refills: 0 | Status: SHIPPED | OUTPATIENT
Start: 2018-01-05 | End: 2018-01-12

## 2018-01-05 RX ADMIN — ALBUTEROL SULFATE 2.5 MG: 0.83 SOLUTION RESPIRATORY (INHALATION) at 09:01

## 2018-01-05 RX ADMIN — IPRATROPIUM BROMIDE 0.5 MG: 0.5 SOLUTION RESPIRATORY (INHALATION) at 09:01

## 2018-01-05 RX ADMIN — BETAMETHASONE SODIUM PHOSPHATE AND BETAMETHASONE ACETATE 9 MG: 3; 3 INJECTION, SUSPENSION INTRA-ARTICULAR; INTRALESIONAL; INTRAMUSCULAR; SOFT TISSUE at 09:01

## 2018-01-05 NOTE — PATIENT INSTRUCTIONS
- Please return here or go to the Emergency Department for any concerns or worsening of condition.   - Follow up with PCP in next 5-7 days  - If you were prescribed antibiotics, please take them to completion.  - If you were prescribed a narcotic medication, do not drive or operate heavy equipment or machinery while taking these medications.  - If you were given a steroid shot in the clinic and have also been given a prescription for a steroid such as Prednisone or a Medrol Dose Pack, please begin taking them tomorrow as instructed or as listed on medication directions.   - Please follow up with your primary care provider (PCP) or discussed specialist(s) as needed.           Bronchitis with Wheezing (Viral or Bacterial: Adult)    Bronchitis is an infection of the air passages. It often occurs during a cold and is usually caused by a virus. Symptoms include cough with mucus (phlegm) and low-grade fever. This illness is contagious during the first few days and is spread through the air by coughing and sneezing, or by direct contact (touching the sick person and then touching your own eyes, nose, or mouth).  If there is a lot of inflammation, air flow is restricted. The air passages may also go into spasm, especially if you have asthma. This causes wheezing and difficulty breathing even in people who do not have asthma.  Bronchitis usually lasts 7 to 14 days. The wheezing should improve with treatment during the first week. An inhaler is often prescribed to relax the air passages and stop wheezing. Antibiotics will be prescribed if your doctor thinks there is also a secondary bacterial infection.  Home care  · If symptoms are severe, rest at home for the first 2 to 3 days. When you go back to your usual activities, don't let yourself get too tired.  · Do not smoke. Also avoid being exposed to secondhand smoke.  · You may use over-the-counter medicine to control fever or pain, unless another medicine was prescribed.  Note: If you have chronic liver or kidney disease or have ever had a stomach ulcer or gastrointestinal bleeding, talk with your healthcare provider before using these medicines. Also talk to your provider if you are taking medicine to prevent blood clots.) Aspirin should never be given to anyone younger than 18 years of age who is ill with a viral infection or fever. It may cause severe liver or brain damage.  · Your appetite may be poor, so a light diet is fine. Avoid dehydration by drinking 6 to 8 glasses of fluids per day (such as water, soft drinks, sports drinks, juices, tea, or soup). Extra fluids will help loosen secretions in the nose and lungs.  · Over-the-counter cough, cold, and sore-throat medicines will not shorten the length of the illness, but they may be helpful to reduce symptoms. (Note: Do not use decongestants if you have high blood pressure.)  · If you were given an inhaler, use it exactly as directed. If you need to use it more often than prescribed, your condition may be worsening. If this happens, contact your healthcare provider.  · If prescribed, finish all antibiotic medicine, even if you are feeling better after only a few days.  Follow-up care  Follow up with your healthcare provider, or as advised. If you had an X-ray or ECG (electrocardiogram), a specialist will review it. You will be notified of any new findings that may affect your care.  Note: If you are age 65 or older, or if you have a chronic lung disease or condition that affects your immune system, or you smoke, talk to your healthcare provider about having a pneumococcal vaccinations and a yearly influenza vaccination (flu shot).  When to seek medical advice  Call your healthcare provider right away if any of these occur:  · Fever of 100.4°F (38°C) or higher  · Coughing up increasing amounts of colored sputum  · Weakness, drowsiness, headache, facial pain, ear pain, or a stiff neck  Call 911, or get immediate medical  care  Contact emergency services right away if any of these occur.  · Coughing up blood  · Worsening weakness, drowsiness, headache, or stiff neck  · Increased wheezing not helped with medication, shortness of breath, or pain with breathing  Date Last Reviewed: 9/13/2015  © 9561-3504 [employer]. [address] [place] [address], [place] [address]. All rights reserved. This information is not intended as a substitute for professional medical care. Always follow your healthcare professional's instructions.        How to Quit Smoking  Smoking is one of the hardest habits to break. About half of all people who have ever smoked have been able to quit. Most people who still smoke want to quit. Here are some of the best ways to stop smoking.    Keep trying  Most smokers make many attempts at quitting before they are successful. Its important not to give up.  Go cold turkey  Most former smokers quit cold turkey (all at once). Trying to cut back gradually doesn't seem to work as well, perhaps because it continues the smoking habit. Also, it is possible to inhale more while smoking fewer cigarettes. This results in the same amount of nicotine in your body.  Get support  Support programs can be a big help, especially for heavy smokers. These groups offer lectures, ways to change behavior, and peer support. Here are some ways to find a support program:  · Free national quitline: 800-QUIT-NOW (835-227-9213).  · Hospital quit-smoking programs.  · American Lung Association: (886.688.6308).  · American Cancer Society (307-107-2228).  Support at home is important too. Nonsmokers can offer praise and encouragement. If the smoker in your life finds it hard to quit, encourage them to keep trying.  Over-the-counter medicines  Nicotine replacement therapy may make quitting easier. Certain aids, such as the nicotine patch, gum, and lozenges, are available without a prescription. It is best to use these under a doctors care, though.  "The skin patch provides a steady supply of nicotine. Nicotine gum and lozenges give temporary bursts of low levels of nicotine. Both methods reduce the craving for cigarettes. Warning: If you have nausea, vomiting, dizziness, weakness, or a fast heartbeat, stop using these products and see your doctor.  Prescription medicines  After reviewing your smoking patterns and past attempts to quit, your doctor may offer a prescription medicine such as bupropion, varenicline, a nicotine inhaler, or nasal spray. Each has advantages and side effects. Your doctor can review these with you.  Health benefits of quitting  The benefits of quitting start right away and keep improving the longer you go without smoking. These benefits occur at any age.  So whether you are 17 or 70, quitting is a good decision. Some of the benefits include:  · 20 minutes: Blood pressure and pulse return to normal.  · 8 hours: Oxygen levels return to normal.  · 2 days: Ability to smell and taste begin to improve as damaged nerves regrow.  · 2 to 3 weeks: Circulation and lung function improve.  · 1 to 9 months: Coughing, congestion, and shortness of breath decrease; tiredness decreases.  · 1 year: Risk of heart attack decreases by half.  · 5 years: Risk of lung cancer decreases by half; risk of stroke becomes the same as a nonsmokers.  For more on how to quit smoking, try these online resources:   · Smokefree.gov  · "Clearing the Air" booklet from the National Cancer [place]: smokefree.gov/sites/default/files/pdf/clearing-the-air-accessible.pdf  Date Last Reviewed: 3/1/2017  © 1750-3529 [employer]. [address] [place] [address], [place] [address]. All rights reserved. This information is not intended as a substitute for professional medical care. Always follow your healthcare professional's instructions.          "

## 2018-01-05 NOTE — PROGRESS NOTES
"Subjective:       Patient ID: Jacqueline O Favret is a 72 y.o. female.    Vitals:  height is 5' 7" (1.702 m) and weight is 59.9 kg (132 lb). Her temperature is 98.6 °F (37 °C). Her blood pressure is 108/68 and her pulse is 108. Her oxygen saturation is 92% (abnormal).     Chief Complaint: URI (Cough, severe chest congestion with SOB, low fever)    Cough, severe chest congestion with SOB, low fever for the past four days.  Almost called 911 last night due to breathing issues.      URI    This is a new problem. The current episode started in the past 7 days. The problem has been rapidly worsening. The maximum temperature recorded prior to her arrival was 100.4 - 100.9 F. The fever has been present for 1 to 2 days. Associated symptoms include congestion, coughing, rhinorrhea and a sore throat. Pertinent negatives include no abdominal pain, chest pain, diarrhea, ear pain, headaches, nausea, neck pain, plugged ear sensation, rash, sinus pain, sneezing, vomiting or wheezing. She has tried decongestant and antihistamine for the symptoms. The treatment provided no relief.   Shortness of Breath   This is a new problem. The current episode started in the past 7 days (3-4 days). The problem occurs constantly. The problem has been gradually worsening. Associated symptoms include a fever, rhinorrhea, a sore throat and sputum production. Pertinent negatives include no abdominal pain, chest pain, ear pain, headaches, hemoptysis, leg swelling, neck pain, rash, syncope, vomiting or wheezing. Nothing aggravates the symptoms. Risk factors include smoking. She has tried nothing for the symptoms. Her past medical history is significant for CAD and COPD (?). There is no history of allergies or asthma.     Review of Systems   Constitution: Positive for chills, fever, weakness and malaise/fatigue. Negative for decreased appetite.   HENT: Positive for congestion, hoarse voice, rhinorrhea and sore throat. Negative for ear discharge, ear " pain, sinus pain and sneezing.    Eyes: Negative for blurred vision, discharge, pain, redness and visual disturbance.   Cardiovascular: Negative for chest pain, dyspnea on exertion, leg swelling, near-syncope and syncope.   Respiratory: Positive for cough, shortness of breath, sleep disturbances due to breathing and sputum production. Negative for hemoptysis and wheezing.    Hematologic/Lymphatic: Negative for adenopathy.   Skin: Negative for itching and rash.   Musculoskeletal: Negative for back pain, myalgias, neck pain and stiffness.   Gastrointestinal: Negative for abdominal pain, diarrhea, nausea and vomiting.   Neurological: Negative for dizziness, headaches, light-headedness and numbness.   Psychiatric/Behavioral: Negative for altered mental status.   Allergic/Immunologic: Negative for hives.   All other systems reviewed and are negative.      Objective:      Physical Exam   Constitutional: She is oriented to person, place, and time. She appears well-developed and well-nourished.  Non-toxic appearance. She has a sickly appearance. She appears ill. No distress.   HENT:   Head: Normocephalic and atraumatic.   Right Ear: Hearing, tympanic membrane, external ear and ear canal normal.   Left Ear: Hearing, tympanic membrane, external ear and ear canal normal.   Nose: No mucosal edema. No epistaxis. Right sinus exhibits no maxillary sinus tenderness and no frontal sinus tenderness. Left sinus exhibits no maxillary sinus tenderness and no frontal sinus tenderness.   Mouth/Throat: Uvula is midline and mucous membranes are normal. No uvula swelling. Posterior oropharyngeal erythema present. No oropharyngeal exudate.   Bilateral nasal congestion and erythema    Eyes: Pupils are equal, round, and reactive to light.   Neck: Normal range of motion and full passive range of motion without pain. Neck supple. No neck rigidity.   Cardiovascular: Normal rate, regular rhythm and normal heart sounds.  Exam reveals no gallop and  no friction rub.    No murmur heard.  Pulmonary/Chest: Effort normal. No accessory muscle usage. No tachypnea and no bradypnea. No respiratory distress. She has no decreased breath sounds. She has wheezes (moderate bilaterally) in the right middle field, the right lower field, the left middle field and the left lower field. She has rhonchi ( diffuse bilaterally) in the right middle field and the left middle field. She has no rales. She exhibits no tenderness and no crepitus.   Musculoskeletal: Normal range of motion.   Lymphadenopathy:        Head (right side): No submental, no submandibular, no preauricular, no posterior auricular and no occipital adenopathy present.        Head (left side): No submental, no submandibular, no preauricular, no posterior auricular and no occipital adenopathy present.     She has no cervical adenopathy.        Right cervical: No posterior cervical adenopathy present.       Left cervical: No posterior cervical adenopathy present.        Right: No supraclavicular adenopathy present.        Left: No supraclavicular adenopathy present.   Neurological: She is alert and oriented to person, place, and time. She is not disoriented. Coordination and gait normal.   Skin: No abrasion, no ecchymosis, no laceration and no rash noted. No erythema.   Psychiatric: She has a normal mood and affect. Her behavior is normal. Judgment normal. Cognition and memory are normal.   Nursing note and vitals reviewed.      2 views    Heart size normal. Lungs are clear. No pleural effusion.   Impression   See above      Electronically signed by: Flavio Ramirez MD  Date: 01/05/18  Time: 08:50    ---------------------------------------------------------------------------------------  Chest PA and lateral    Indication:Bronchitis    Comparison:3/8/2016    Findings: The cardiomediastinal silhouette is not enlarged, noting calcification of the aortic arch. There is stable elevation of the right hemidiaphragm.. There is no  pleural effusion. The trachea is midline. The lungs are are symmetrically hyperinflated bilaterally, could reflect sequela of COPD/emphysema without evidence of acute parenchymal process. No large focal consolidation seen. There is no pneumothorax. The osseous structures are remarkable for degenerative changes of the spine noting stable vertebral plasty change.   Impression       No acute cardiopulmonary process, stable chronic findings, possibly indicating COPD/emphysema, correlation recommended..         Electronically signed by: RACQUEL NEUMANN MD  Date: 08/27/17  Time: 16:33      Assessment:       1. Bronchitis    2. Bronchospasm, acute        - Rapid Flu negative at this time  - SpO2 improved from 86% to 92% with significant symptom improvement and less wheezing on auscultation  - Patient reports that she has an Albuterol Inhaler at home     Plan:         Bronchitis  -     POCT Influenza A/B  -     X-Ray Chest PA And Lateral; Future; Expected date: 01/05/2018  -     betamethasone acetate-betamethasone sodium phosphate injection 9 mg; Inject 1.5 mLs (9 mg total) into the muscle once.  -     methylPREDNISolone (MEDROL DOSEPACK) 4 mg tablet; use as directed  Dispense: 1 Package; Refill: 0  -     benzonatate (TESSALON) 200 MG capsule; Take 1 capsule (200 mg total) by mouth 3 (three) times daily as needed for Cough.  Dispense: 30 capsule; Refill: 0  -     promethazine-dextromethorphan (PROMETHAZINE-DM) 6.25-15 mg/5 mL Syrp; Take 5 mLs by mouth every 6 (six) hours as needed (cough or congestion).  Dispense: 118 mL; Refill: 0  -     doxycycline (DORYX) 100 MG EC tablet; Take 1 tablet (100 mg total) by mouth 2 (two) times daily.  Dispense: 20 tablet; Refill: 0    Bronchospasm, acute  -     albuterol nebulizer solution 2.5 mg; Take 3 mLs (2.5 mg total) by nebulization one time.  -     ipratropium 0.02 % nebulizer solution 0.5 mg; Take 2.5 mLs (0.5 mg total) by nebulization one time.  -     betamethasone  acetate-betamethasone sodium phosphate injection 9 mg; Inject 1.5 mLs (9 mg total) into the muscle once.  -     methylPREDNISolone (MEDROL DOSEPACK) 4 mg tablet; use as directed  Dispense: 1 Package; Refill: 0    Other orders  -     Cancel: albuterol 90 mcg/actuation inhaler; Inhale 2 puffs into the lungs every 6 (six) hours as needed for Wheezing or Shortness of Breath. Rescue  Dispense: 18 g; Refill: 0    - Please return here or go to the Emergency Department for any concerns or worsening of condition.   - Follow up with PCP in next 5-7 days for further management  - If you were prescribed antibiotics, please take them to completion.  - If you were prescribed a narcotic medication, do not drive or operate heavy equipment or machinery while taking these medications.  - If you were given a steroid shot in the clinic and have also been given a prescription for a steroid such as Prednisone or a Medrol Dose Pack, please begin taking them tomorrow as instructed or as listed on medication directions.    - Please follow up with your primary care provider (PCP) or discussed specialist(s) as needed.

## 2018-01-11 ENCOUNTER — TELEPHONE (OUTPATIENT)
Dept: INTERNAL MEDICINE | Facility: CLINIC | Age: 73
End: 2018-01-11

## 2018-01-11 DIAGNOSIS — Z12.31 SCREENING MAMMOGRAM, ENCOUNTER FOR: Primary | ICD-10-CM

## 2018-01-11 NOTE — TELEPHONE ENCOUNTER
----- Message from Ally Paul sent at 1/11/2018  8:48 AM CST -----  Contact: Pt [phone]  Patient would like to schedule their annual mammogram appointment, please enter the order and contact the patient to schedule.

## 2018-01-23 RX ORDER — ALPRAZOLAM 0.25 MG/1
TABLET ORAL
Qty: 20 TABLET | Refills: 0 | Status: SHIPPED | OUTPATIENT
Start: 2018-01-23 | End: 2018-03-15 | Stop reason: SDUPTHER

## 2018-01-23 RX ORDER — ALPRAZOLAM 0.25 MG/1
TABLET ORAL
Qty: 20 TABLET | Refills: 0 | OUTPATIENT
Start: 2018-01-23

## 2018-01-25 ENCOUNTER — HOSPITAL ENCOUNTER (OUTPATIENT)
Dept: RADIOLOGY | Facility: HOSPITAL | Age: 73
Discharge: HOME | End: 2018-01-25
Attending: INTERNAL MEDICINE
Payer: MEDICARE

## 2018-01-25 VITALS — HEIGHT: 67 IN | WEIGHT: 132 LBS | BODY MASS INDEX: 20.72 KG/M2

## 2018-01-25 DIAGNOSIS — Z12.31 SCREENING MAMMOGRAM, ENCOUNTER FOR: ICD-10-CM

## 2018-01-25 PROCEDURE — 77067 SCR MAMMO BI INCL CAD: CPT | Mod: TC,PO

## 2018-01-25 PROCEDURE — 77067 SCR MAMMO BI INCL CAD: CPT | Mod: 26,,, | Performed by: RADIOLOGY

## 2018-01-25 PROCEDURE — 77063 BREAST TOMOSYNTHESIS BI: CPT | Mod: 26,,, | Performed by: RADIOLOGY

## 2018-01-26 ENCOUNTER — LAB VISIT (OUTPATIENT)
Dept: LAB | Facility: HOSPITAL | Age: 73
End: 2018-01-26
Attending: INTERNAL MEDICINE
Payer: MEDICARE

## 2018-01-26 ENCOUNTER — OFFICE VISIT (OUTPATIENT)
Dept: INTERNAL MEDICINE | Facility: CLINIC | Age: 73
End: 2018-01-26
Payer: MEDICARE

## 2018-01-26 VITALS
WEIGHT: 134 LBS | HEART RATE: 62 BPM | BODY MASS INDEX: 21.03 KG/M2 | SYSTOLIC BLOOD PRESSURE: 142 MMHG | DIASTOLIC BLOOD PRESSURE: 81 MMHG | HEIGHT: 67 IN

## 2018-01-26 DIAGNOSIS — E78.5 HYPERLIPIDEMIA, UNSPECIFIED HYPERLIPIDEMIA TYPE: ICD-10-CM

## 2018-01-26 DIAGNOSIS — Z13.820 SCREENING FOR OSTEOPOROSIS: ICD-10-CM

## 2018-01-26 DIAGNOSIS — I10 ESSENTIAL HYPERTENSION: ICD-10-CM

## 2018-01-26 DIAGNOSIS — Z85.828 PERSONAL HISTORY OF SKIN CANCER: ICD-10-CM

## 2018-01-26 DIAGNOSIS — I10 ESSENTIAL HYPERTENSION: Primary | ICD-10-CM

## 2018-01-26 DIAGNOSIS — R29.898 WEAKNESS OF LEFT HIP: ICD-10-CM

## 2018-01-26 DIAGNOSIS — I25.10 CORONARY ARTERY DISEASE INVOLVING NATIVE CORONARY ARTERY OF NATIVE HEART WITHOUT ANGINA PECTORIS: ICD-10-CM

## 2018-01-26 LAB
ALBUMIN SERPL BCP-MCNC: 3.4 G/DL
ALP SERPL-CCNC: 75 U/L
ALT SERPL W/O P-5'-P-CCNC: 49 U/L
ANION GAP SERPL CALC-SCNC: 11 MMOL/L
AST SERPL-CCNC: 64 U/L
BASOPHILS # BLD AUTO: 0.02 K/UL
BASOPHILS NFR BLD: 0.4 %
BILIRUB SERPL-MCNC: 0.9 MG/DL
BUN SERPL-MCNC: 13 MG/DL
CALCIUM SERPL-MCNC: 9.5 MG/DL
CHLORIDE SERPL-SCNC: 102 MMOL/L
CHOLEST SERPL-MCNC: 251 MG/DL
CHOLEST/HDLC SERPL: 2.7 {RATIO}
CO2 SERPL-SCNC: 28 MMOL/L
CREAT SERPL-MCNC: 0.8 MG/DL
CRP SERPL-MCNC: 0.5 MG/L
DIFFERENTIAL METHOD: ABNORMAL
EOSINOPHIL # BLD AUTO: 0.1 K/UL
EOSINOPHIL NFR BLD: 2.2 %
ERYTHROCYTE [DISTWIDTH] IN BLOOD BY AUTOMATED COUNT: 13 %
ERYTHROCYTE [SEDIMENTATION RATE] IN BLOOD BY WESTERGREN METHOD: 5 MM/HR
EST. GFR  (AFRICAN AMERICAN): >60 ML/MIN/1.73 M^2
EST. GFR  (NON AFRICAN AMERICAN): >60 ML/MIN/1.73 M^2
GLUCOSE SERPL-MCNC: 89 MG/DL
HCT VFR BLD AUTO: 41.3 %
HDLC SERPL-MCNC: 94 MG/DL
HDLC SERPL: 37.5 %
HGB BLD-MCNC: 13.2 G/DL
LDLC SERPL CALC-MCNC: 92.6 MG/DL
LYMPHOCYTES # BLD AUTO: 1.3 K/UL
LYMPHOCYTES NFR BLD: 28 %
MCH RBC QN AUTO: 31.3 PG
MCHC RBC AUTO-ENTMCNC: 32 G/DL
MCV RBC AUTO: 98 FL
MONOCYTES # BLD AUTO: 0.5 K/UL
MONOCYTES NFR BLD: 10.1 %
NEUTROPHILS # BLD AUTO: 2.8 K/UL
NEUTROPHILS NFR BLD: 59.3 %
NONHDLC SERPL-MCNC: 157 MG/DL
PLATELET # BLD AUTO: 194 K/UL
PMV BLD AUTO: 9.7 FL
POTASSIUM SERPL-SCNC: 4.3 MMOL/L
PROT SERPL-MCNC: 6.8 G/DL
RBC # BLD AUTO: 4.22 M/UL
SODIUM SERPL-SCNC: 141 MMOL/L
TRIGL SERPL-MCNC: 322 MG/DL
TSH SERPL DL<=0.005 MIU/L-ACNC: 1.46 UIU/ML
WBC # BLD AUTO: 4.65 K/UL

## 2018-01-26 PROCEDURE — 1159F MED LIST DOCD IN RCRD: CPT | Mod: S$GLB,,, | Performed by: INTERNAL MEDICINE

## 2018-01-26 PROCEDURE — 99214 OFFICE O/P EST MOD 30 MIN: CPT | Mod: S$GLB,,, | Performed by: INTERNAL MEDICINE

## 2018-01-26 PROCEDURE — 1125F AMNT PAIN NOTED PAIN PRSNT: CPT | Mod: S$GLB,,, | Performed by: INTERNAL MEDICINE

## 2018-01-26 PROCEDURE — 80053 COMPREHEN METABOLIC PANEL: CPT

## 2018-01-26 PROCEDURE — 80061 LIPID PANEL: CPT

## 2018-01-26 PROCEDURE — 36415 COLL VENOUS BLD VENIPUNCTURE: CPT | Mod: PO

## 2018-01-26 PROCEDURE — 85025 COMPLETE CBC W/AUTO DIFF WBC: CPT | Mod: PO

## 2018-01-26 PROCEDURE — 85651 RBC SED RATE NONAUTOMATED: CPT

## 2018-01-26 PROCEDURE — 99999 PR PBB SHADOW E&M-EST. PATIENT-LVL V: CPT | Mod: PBBFAC,,, | Performed by: INTERNAL MEDICINE

## 2018-01-26 PROCEDURE — 3008F BODY MASS INDEX DOCD: CPT | Mod: S$GLB,,, | Performed by: INTERNAL MEDICINE

## 2018-01-26 PROCEDURE — 86140 C-REACTIVE PROTEIN: CPT

## 2018-01-26 PROCEDURE — 84443 ASSAY THYROID STIM HORMONE: CPT

## 2018-01-26 RX ORDER — AMLODIPINE BESYLATE 5 MG/1
5 TABLET ORAL DAILY
Qty: 90 TABLET | Refills: 3 | Status: SHIPPED | OUTPATIENT
Start: 2018-01-26 | End: 2018-08-23

## 2018-01-26 RX ORDER — ALBUTEROL SULFATE 90 UG/1
AEROSOL, METERED RESPIRATORY (INHALATION)
Refills: 1 | COMMUNITY
Start: 2018-01-03

## 2018-01-28 ENCOUNTER — PATIENT MESSAGE (OUTPATIENT)
Dept: INTERNAL MEDICINE | Facility: CLINIC | Age: 73
End: 2018-01-28

## 2018-02-01 ENCOUNTER — TELEPHONE (OUTPATIENT)
Dept: INTERNAL MEDICINE | Facility: CLINIC | Age: 73
End: 2018-02-01

## 2018-02-01 NOTE — TELEPHONE ENCOUNTER
----- Message from Frances Buenrostro sent at 2/1/2018  9:27 AM CST -----  Contact: self/[phone]  Patient called in regards needing to talk with Dr Orozco about test result (01/26/18), and physical therapy. Please call and advise.       Thank you!!!

## 2018-02-02 NOTE — TELEPHONE ENCOUNTER
----- Message from Dayanara Tsai sent at 2/2/2018  8:25 AM CST -----  Contact: Patient [phone]  Telephone consult    She wants to discuss her test result and issues she have with the referral to physical.    Thank you

## 2018-02-04 RX ORDER — MIRTAZAPINE 15 MG/1
15 TABLET, FILM COATED ORAL NIGHTLY
Qty: 90 TABLET | Refills: 1 | Status: SHIPPED | OUTPATIENT
Start: 2018-02-04 | End: 2018-08-07 | Stop reason: SDUPTHER

## 2018-02-04 NOTE — PROGRESS NOTES
Subjective:       Patient ID: Jacqueline O Favret is a 72 y.o. female.    Chief Complaint: Follow-up    HPIPt had a fall and still is not walking well with L leg/hip. Has been traveling - no CP or SOB.   Seh has lost some weight.  Review of Systems   Respiratory: Negative for shortness of breath (PND or orthopnea).    Cardiovascular: Negative for chest pain (arm pain or jaw pain).   Gastrointestinal: Negative for abdominal pain, diarrhea, nausea and vomiting.   Genitourinary: Negative for dysuria.   Neurological: Negative for seizures, syncope and headaches.       Objective:      Physical Exam   Constitutional: She is oriented to person, place, and time. She appears well-developed and well-nourished. No distress.   HENT:   Head: Normocephalic.   Mouth/Throat: Oropharynx is clear and moist.   Neck: Neck supple. No JVD present. No thyromegaly present.   Cardiovascular: Normal rate, regular rhythm, normal heart sounds and intact distal pulses.  Exam reveals no gallop and no friction rub.    No murmur heard.  Pulmonary/Chest: Effort normal and breath sounds normal. She has no wheezes. She has no rales.   Abdominal: Soft. Bowel sounds are normal. She exhibits no distension and no mass. There is no tenderness. There is no rebound and no guarding.   Musculoskeletal: She exhibits no edema.   Lymphadenopathy:     She has no cervical adenopathy.   Neurological: She is alert and oriented to person, place, and time. She has normal reflexes.   Skin: Skin is warm and dry.   Psychiatric: She has a normal mood and affect. Her behavior is normal. Judgment and thought content normal.       Assessment:       1. Essential hypertension    2. Hyperlipidemia, unspecified hyperlipidemia type    3. Coronary artery disease involving native coronary artery of native heart without angina pectoris    4. Personal history of skin cancer    5. Screening for osteoporosis    6. Weakness of left hip        Plan:   Essential hypertension  -     CBC  auto differential; Future; Expected date: 01/26/2018  -     Comprehensive metabolic panel; Future; Expected date: 01/26/2018  -     TSH; Future; Expected date: 01/26/2018  Uncontrolled increase amlodipine to 5mg   Hyperlipidemia, unspecified hyperlipidemia type  -     Lipid panel; Future; Expected date: 01/26/2018    Coronary artery disease involving native coronary artery of native heart without angina pectoris  asymptomatic  Personal history of skin cancer  -     Sedimentation rate, manual; Future; Expected date: 01/26/2018  -     C-reactive protein; Future; Expected date: 01/26/2018  Keep derm follow up  Screening for osteoporosis  -     DXA Bone Density Spine And Hip; Future; Expected date: 01/26/2018    Weakness of left hip  -     Ambulatory consult to Physical Therapy    Other orders  -     amLODIPine (NORVASC) 5 MG tablet; Take 1 tablet (5 mg total) by mouth once daily.  Dispense: 90 tablet; Refill: 3  Consider taking remeron regularly for weight - may have dropped due to pain meds from the hip

## 2018-02-08 ENCOUNTER — CLINICAL SUPPORT (OUTPATIENT)
Dept: REHABILITATION | Facility: HOSPITAL | Age: 73
End: 2018-02-08
Attending: INTERNAL MEDICINE
Payer: MEDICARE

## 2018-02-08 DIAGNOSIS — M54.50 ACUTE RIGHT-SIDED LOW BACK PAIN WITHOUT SCIATICA: ICD-10-CM

## 2018-02-08 DIAGNOSIS — R29.898 WEAKNESS OF RIGHT LOWER EXTREMITY: ICD-10-CM

## 2018-02-08 DIAGNOSIS — R26.81 GAIT INSTABILITY: ICD-10-CM

## 2018-02-08 DIAGNOSIS — R29.898 WEAKNESS OF LEFT LOWER EXTREMITY: ICD-10-CM

## 2018-02-08 PROCEDURE — G8978 MOBILITY CURRENT STATUS: HCPCS | Mod: CK

## 2018-02-08 PROCEDURE — 97161 PT EVAL LOW COMPLEX 20 MIN: CPT

## 2018-02-08 PROCEDURE — G8979 MOBILITY GOAL STATUS: HCPCS | Mod: CK

## 2018-02-08 NOTE — PATIENT INSTRUCTIONS
"  Copyright © 5472-2724 [employer]    PIRIFORMIS STRETCH - MODIFIED    While lying on your back, hold your knee with your opposite hand and draw your knee up and over towards your opposite shoulder. 3 reps, hold 30 seconds. Perform twice a day        Copyright © 2212-4405 [employer]    SI joint Muscle energy for L Posterior/R anterior rotation    Bring both knees up towards your chest as shown in the picture.  Place your Left hand on top of your Left thigh, and your Right hand on the back of your Right thigh.  Push your legs into each hand with about 50% effort.  Perform 3 reps and hold for 3 seconds. Perform twice a day.          Copyright © 7603-7753 [employer]    BRIDGING    While lying on your back, tighten your lower abdominals, squeeze your buttocks and then raise your buttocks off the floor/bed as creating a "Bridge" with your body. Hold and then lower yourself and repeat. Perform 3 reps twice a day.       Copyright © 5280-8304 [employer]    SI Joint Muscle Energy Symphysis Pubis Reset    Bring both knees up towards your chest as shown in the picture.  Place your hands between your knees.  Squeeze your legs together and hold for 5 seconds.  Perform twice a day.          "

## 2018-02-09 NOTE — PLAN OF CARE
"Name:Marli O Favret  Physician:Kristina Orozco MD  Date of eval:2/8/2018  Orders:  Physical Therapy evaluate and treat  Clinical#:[id number]  Diagnosis:  1. Gait instability     2. Weakness of left lower extremity          1 of 12 visits. Expiration 4/8/18    Subjective:  Reports she is experiencing R low back pain and pain into her R buttocks as well as R hip weakness. States she was walking [place] in the [place] recently and "couldn't keep up."    Onset of pain: September 2017  Mechanism of onset: L superior rami fracture    Chief complaint: Patient is a 73 yo WF referred to OP PT secondary R low back pain, pain in R buttocks, and R LE weakness.    Aggravating factors: increased walking distances, getting out of bed in the morning, and prolonged standing.  Easing factors: massage, sitting in her recliner    Previous functional status includes: I with amb and ADL  Current functional status: I with amb and ADL    Patients structured exercise routine: none  Exercise routine prior to onset : none    Allergies:  Review of patient's allergies indicates:  No Known Allergies    Past Surgical History:   Procedure Laterality Date    blephroplasty      BREAST BIOPSY Right     excisional 1985    BUNIONECTOMY  Jan 2013    right foot    CARDIAC CATHETERIZATION      COLONOSCOPY N/A 9/20/2016    Procedure: COLONOSCOPY;  Surgeon: Rachelle Guerra MD;  Location: [address];  Service: Endoscopy;  Laterality: N/A;    COLONOSCOPY N/A 12/21/2016    Procedure: COLONOSCOPY;  Surgeon: Freedom Sandoval MD;  Location: [address]);  Service: Endoscopy;  Laterality: N/A;  PREP: PREPOPIK  [place] [facility]    FOOT SURGERY      right foot    HYSTERECTOMY  1980s    bleeding    orbital fx      TONSILLECTOMY      VAGINA SURGERY         Medical history:   Past Medical History:   Diagnosis Date    Abnormal liver enzymes 4/6/2015    Acute on chronic kidney disease, stage 3 4/18/2013    " Alcohol-induced acute pancreatitis 8/16/2015    Anemia     Bunion, right foot 12/14/2012    Compression fracture 11/14/2013    MI (myocardial infarction) 1991    PAF (paroxysmal atrial fibrillation) 9/8/2015    During acute illness     SCC (squamous cell carcinoma) 05/11/2016    in situ left lower lip, nasal bridge         Medication:   Current Outpatient Prescriptions on File Prior to Visit   Medication Sig Dispense Refill    acetaminophen (TYLENOL) 325 MG tablet Take 325 mg by mouth every 6 (six) hours as needed.      ALPRAZolam (XANAX) 0.25 MG tablet TAKE 1/2 TO 1 TABLET BY MOUTH BEFORE FLIGHT 20 tablet 0    amlodipine (NORVASC) 2.5 MG tablet Take 1 tablet (2.5 mg total) by mouth once daily. 90 tablet 3    amLODIPine (NORVASC) 5 MG tablet Take 1 tablet (5 mg total) by mouth once daily. 90 tablet 3    co-enzyme Q-10 30 mg capsule Take 30 mg by mouth 3 (three) times daily.      fexofenadine (ALLEGRA) 180 MG tablet Take by mouth daily as needed. 1 Tablet Oral Every day      Lactobacillus rhamnosus GG (CULTURELLE) 10 billion cell capsule Take 1 capsule by mouth once daily.      metoprolol tartrate (LOPRESSOR) 50 MG tablet Take 1 tablet (50 mg total) by mouth 2 (two) times daily. 180 tablet 3    mirtazapine (REMERON) 15 MG tablet TAKE 1 TABLET (15 MG TOTAL) BY MOUTH EVERY EVENING. 90 tablet 1    multivitamin (ONE DAILY MULTIVITAMIN) per tablet Take 1 tablet by mouth once daily.      omeprazole (PRILOSEC) 20 MG capsule Take 1 capsule (20 mg total) by mouth once daily. 90 capsule 3    ramipril (ALTACE) 10 MG capsule Take 1 capsule (10 mg total) by mouth 2 (two) times daily. 180 capsule 3    rosuvastatin (CRESTOR) 20 MG tablet Take 1 tablet (20 mg total) by mouth once daily. 90 tablet 3    VENTOLIN HFA 90 mcg/actuation inhaler INHALE 1-2 PUFFS INTO THE LUNGS EVERY 4 (FOUR) HOURS AS NEEDED.  1     No current facility-administered medications on file prior to visit.          Past treatment includes:  massage    Social: lives alone in single level home    Pts goals: learn exercises to make gait normal and relieve the pain  Pain level with 0 being the lowest and 10 being the highest presently: 4-5  Pain level with 0 being the lowest and 10 being the highest at worst: 8    OBJECTIVE:     Functional assessment:   - walking: Trendelenberg gait pattern indicating R hip weakness noted. Pt scored 22/24 on Dynamic Gait Index  - sit to stand: I  -  R anterior rotated pelvis, L posterior pelvis  - shoulders shifted to the R and pelvis shifted to the left      AROM  LE MMT  R  L    Hip flexion  4+/5  4+/5    Hip abduction  3-/5  3+/5    Hip extension  3+/5  3+/5    Hip ER  5/5  5/5    Hip IR  5/5  5/5    Knee extension  5/5  5/5    Knee flexion  5/5  5/5    Ankle dorsiflexion  5/5  5/5    Ankle plantar flexion  5/5  5/5        Flexibility testing:  - hamstrings:     B: WNL  - piriformis:        R: tight, decreased 25%, L: WFL  - quadriceps:     B: tight, decreased 50%  - hip adductors: B: WNL  - hip flexors:      R: tight, decreased 25%  - IT bands:         B: tight. R: decreased 25%, L: WFL        Other:  Functional Limitations  Reports - G Codes    Category:  Mobility   Tool:  FOTO Outcomes Measurement System.   Score:  Patient scored out 47 of 100 on the FOTO Outcomes Measurement System.   Current:   CK at least 40% < 60% impaired, limited or restricted   Goal:   CK at least 40% < 60% impaired, limited or restricted       Patient History Examination Clinical Presentation Clinical Decision Making   Comorbidities:  Hx of fall in 9/17 and L superior rami fx    Personal Factors:  none       Activity and Participation Restriction:  Limits walking activities with her froend    Body Systems:  Musculoskeletal: strength  Neuromuscular: gait    Body Regions: low back, LEs Stable and uncomplicated         Low       FOTO:  40% < 60% impaired, limited or restricted         TREATMENT: evaluation, low complexity    Pt was  "educated, performed, and was provided with a written copy of HEP to perform as tolerated, including:  Piriformis stretch  Push/pull  Bridging  Resisted hip add    Exercises were reviewed and pt was able to demonstrate them prior to the end of the session.     No cultural, environmental, or spiritual barriers identified to treatment or learning.    Pt was provided educational information, including  Ed pt to use ice prn 10- 20 min when pain or swelling occurs.    Treatment today also included:  Piriformis stretch 3 x 30"  Push/pull 3 x 3"  Bridging 3 reps  Resisted hip add 5"      Discussed insurance limitations with pt.    ASSESSMENT: PT diagnosis: pelvic dysfunction, low back pain, R LE pain, decreased B LE strength and flexibility.  Patient can benefit from outpatient physical therapy and a home program. Prognosis is good. Pt presented in pelvic dysfunction today. Push/pull MET promotes correct rotation alignment at pelvis, but pt shifting pelvis to the left. Will proceed with core strengthening exercises, LE strengthening exercises, and stretches to promote below listed goals. Will also proceed with balance and coordination exercises to increase gait stability.    Medical necessity is demonstrated by the following IMPAIRMENTS:  - pain  - decreased flexibility  - decreased muscle strength  - impaired function  - decreased exercise ability    GOALS:  6-8 weeks (4/8/18). Pt agrees with goals set.  1. Independent with HEP.  2. Report decreased low back and R LE pain < or = 3/10 with adls such as increased walking distances, getting out of bed in the morning, and prolonged standing.  3. Increased MMT for B LE by 1 muscle grade to promote proper pelvic stability to decrease low back and R LE pain < or = 3/10 with adls such as increased walking distances, getting out of bed in the morning, and prolonged standing.     4. Increased flexibility in R piriformis, B quads, R IT band, and R hip flexors to promote proper pelvic " stability to decrease low back and R LE pain < or = 3/10 with adls such as increased walking distances, getting out of bed in the morning, and prolonged standing.  5. Patient to achieve CK (at least 40% < 60% impaired, limited or restricted) level on the FOTO Outcomes Measurement System.    PLAN:  Outpatient physical therapy 2 times weekly to include: pt ed, hep, therapeutic exercises, neuromuscular re-education/ balance exercises, joint mobilizations, and modalities prn. Pt may be seen by PTA to carry out plan of care.      Cont PT for 6-8  weeks.     I certify the need for these services furnished under this plan of treatment and while under my care.      ____________________________________ Physician/Referring Practitioner         _____________________ Date of Signature

## 2018-02-16 ENCOUNTER — CLINICAL SUPPORT (OUTPATIENT)
Dept: REHABILITATION | Facility: HOSPITAL | Age: 73
End: 2018-02-16
Attending: INTERNAL MEDICINE
Payer: MEDICARE

## 2018-02-16 DIAGNOSIS — R26.81 GAIT INSTABILITY: ICD-10-CM

## 2018-02-16 DIAGNOSIS — R29.898 WEAKNESS OF RIGHT LOWER EXTREMITY: ICD-10-CM

## 2018-02-16 DIAGNOSIS — M54.50 ACUTE RIGHT-SIDED LOW BACK PAIN WITHOUT SCIATICA: ICD-10-CM

## 2018-02-16 PROCEDURE — 97110 THERAPEUTIC EXERCISES: CPT

## 2018-02-16 NOTE — PROGRESS NOTES
"                                                    Physical Therapy Progress Note     Name: Jacqueline O Favret  Clinic Number: [id number]  Diagnosis:   Encounter Diagnoses   Name Primary?    Gait instability     Acute right-sided low back pain without sciatica     Weakness of right lower extremity      Physician: Kristina Orozco MD  Treatment Orders: PT Evaluation and Treatment  Past Medical History:   Diagnosis Date    Abnormal liver enzymes 4/6/2015    Acute on chronic kidney disease, stage 3 4/18/2013    Alcohol-induced acute pancreatitis 8/16/2015    Anemia     Bunion, right foot 12/14/2012    Compression fracture 11/14/2013    MI (myocardial infarction) 1991    PAF (paroxysmal atrial fibrillation) 9/8/2015    During acute illness     SCC (squamous cell carcinoma) 05/11/2016    in situ left lower lip, nasal bridge       Precautions: standard    Evaluation date: 2/08/18  Visit #: 2/12  Authorization period expiration: 4/08/18    Subjective     Pt reports: R sided low back pain that comes and goes and is very unpredictable.      Pain Scale: before treatment: 5/10 currently; after treatment: NT    Objective   Therapeutic exercise: Jacqueline O Favret received therapeutic exercises to develop BLE strengthening/flexibility and core/hip stabilization for 55 minutes including:       Piriformis stretch 2x30"  Push/pull 3x5"  Resisted hip adduction 5"  Bridges 3 reps  IT band stretch 2x30"  PPT w/ TA activation 20x"5  Bridges 2x8x3"  Knee fall outs w/ TA activation w/ YTB 2x8x3"  Clamshells with YTB 2x8x3"  Standing hip ext/abd 2x10      Written Home Exercises Provided:   Pt demo good understanding of the education provided during the initial evaluation. Jacqueline O Favret demonstrated good return demonstration of activities.      Pt. education:  · Posture reeducation, body mechanics, HEP,activity modification/avoidance  · No spiritual or educational barriers to learning provided  · Pt has no cultural, " educational or language barriers to learning provided.    Assessment     Pt tolerated first tx post initial evaluation well. Pt, however, did note LBP when attempting to stretch hip flexor in supine and quadricepts in prone. Will progress as tolerated. Pt will continue to benefit from skilled outpatient physical therapy to address the remaining functional deficits, provide pt/family education, and to maximize pt's level of independence in the home and community environment.      Anticipated barriers to physical therapy: None    Medical necessity is demonstrated by the following IMPAIRMENTS:  - pain  - decreased flexibility  - decreased muscle strength  - impaired function  - decreased exercise ability     GOALS:  6-8 weeks (4/8/18). Pt agrees with goals set.  1. Independent with HEP.  2. Report decreased low back and R LE pain < or = 3/10 with adls such as increased walking distances, getting out of bed in the morning, and prolonged standing.  3. Increased MMT for B LE by 1 muscle grade to promote proper pelvic stability to decrease low back and R LE pain < or = 3/10 with adls such as increased walking distances, getting out of bed in the morning, and prolonged standing.     4. Increased flexibility in R piriformis, B quads, R IT band, and R hip flexors to promote proper pelvic stability to decrease low back and R LE pain < or = 3/10 with adls such as increased walking distances, getting out of bed in the morning, and prolonged standing.  5. Patient to achieve CK (at least 40% < 60% impaired, limited or restricted) level on the FOTO Outcomes Measurement System.  · Pt's spiritual, cultural and educational needs considered and pt agreeable to plan of care and goals as stated below:        Plan   Continue with established Plan of Care towards PT goals.

## 2018-02-19 ENCOUNTER — CLINICAL SUPPORT (OUTPATIENT)
Dept: REHABILITATION | Facility: HOSPITAL | Age: 73
End: 2018-02-19
Attending: INTERNAL MEDICINE
Payer: MEDICARE

## 2018-02-19 DIAGNOSIS — R26.81 GAIT INSTABILITY: ICD-10-CM

## 2018-02-19 DIAGNOSIS — M54.50 ACUTE RIGHT-SIDED LOW BACK PAIN WITHOUT SCIATICA: ICD-10-CM

## 2018-02-19 DIAGNOSIS — R29.898 WEAKNESS OF RIGHT LOWER EXTREMITY: ICD-10-CM

## 2018-02-19 PROCEDURE — 97110 THERAPEUTIC EXERCISES: CPT

## 2018-02-19 NOTE — PROGRESS NOTES
"Name: Jacqueline O Favret  Clinic Number: [id number]  Date of Treatment: 2/19/2018  Diagnosis:     ICD-10-CM ICD-9-CM    1. Gait instability R26.81 781.2    2. Acute right-sided low back pain without sciatica M54.5 724.2    3. Weakness of right lower extremity R29.898 729.89      Precautions: standard    Evaluation date: 2/08/18  Visit #: 2/12  Authorization period expiration: 4/08/18    Subjective: Jacqueline O Favret reports improvement of symptoms.  Patient reports her pain to be 2/10 on a 0-10 scale with 0 being no pain and 10 being the worst pain imaginable.    Objective:  Patient was educated and performed therapy to increase strength, flexibility, posture and core stabilization with activities as follows:     Jacqueline O Favret was educated and performed therapeutic exercises to develop strength, flexibility, posture and core stabilization for 50 total minutes including:     One on one ther ex x 40 minutes    Piriformis stretch 3 x 30"  Push/pull 3 x 5"  Bridges 3 reps  Resisted hip adduction 5"  IT band stretch 2 x 30"  PPT w/ TA activation 20 x 5"  Bridges 2 x 8 x 3"  Knee fall outs w/ TA activation w/ YTB 2 x 8 x 5"  Clamshells with YTB 25 x 3"  SKTC 3 x 30"  DKTC 3 x 30"  Prone quad stretches 3 x 30"  Standing hip ext 2 x 10 reps  Standing hip abd 2 x 10 reps      Written Home Exercises Provided:   Posterior pelvic tilts  Single knee to chest  Double knee to chest  Prone quad stretches     Pt demo good understanding of the education provided. Jacqueline O Favret demonstrated good return demonstration of activities.     Assessment:       Pt will continue to benefit from skilled PT intervention. Medical Necessity is demonstrated by:  Pain limits function of effected part for some activities, Unable to participate fully in daily activities, Requires skilled supervision to complete and progress HEP and Weakness.    Patient is making good progress towards established goals.    New/Revised goals: continue with " current goals   GOALS:  6-8 weeks (4/8/18). Pt agrees with goals set.  1. Independent with HEP.  2. Report decreased low back and R LE pain < or = 3/10 with adls such as increased walking distances, getting out of bed in the morning, and prolonged standing.  3. Increased MMT for B LE by 1 muscle grade to promote proper pelvic stability to decrease low back and R LE pain < or = 3/10 with adls such as increased walking distances, getting out of bed in the morning, and prolonged standing.     4. Increased flexibility in R piriformis, B quads, R IT band, and R hip flexors to promote proper pelvic stability to decrease low back and R LE pain < or = 3/10 with adls such as increased walking distances, getting out of bed in the morning, and prolonged standing.  5. Patient to achieve CK (at least 40% < 60% impaired, limited or restricted) level on the FOTO Outcomes Measurement System.      Plan: Continue with established Plan of Care towards PT goals.

## 2018-02-19 NOTE — PATIENT INSTRUCTIONS
(Home) Flexion: Pelvic Tilt        Lie with neck supported, knees bent, feet flat. Tighten and suck stomach in, pushing back down against surface. Do not push down with legs.  Repeat _20_ times per set, hold for  5  seconds. Do _2_ sets per session.     Copyright © [employer]. All rights reserved.   Double Knee to Chest (Flexion)        Gently pull both knees toward chest. Feel stretch in lower back or buttock area. Breathing deeply, Hold _30_ seconds. _3_ reps per set, _1-2__ sets per day.     [email or web address]     Copyright © [employer]. All rights reserved.   Knee-to-Chest Stretch: Unilateral        With hand behind right knee, pull knee in to chest until a comfortable stretch is felt in lower back and buttocks. Keep back relaxed. Hold _30_ seconds. _3_ reps per set, _1-2__ sets per day.  [email or web address]     Copyright © [employer]. All rights reserved.   KNEE: Quadriceps - Prone        Place strap around ankle. Bring ankle toward buttocks. Press hip into surface. Hold _30_ seconds. _3_ reps per set, _1-2__ sets per day.      Copyright © [employer]. All rights reserved.

## 2018-02-23 ENCOUNTER — CLINICAL SUPPORT (OUTPATIENT)
Dept: REHABILITATION | Facility: HOSPITAL | Age: 73
End: 2018-02-23
Attending: INTERNAL MEDICINE
Payer: MEDICARE

## 2018-02-23 DIAGNOSIS — M54.50 ACUTE RIGHT-SIDED LOW BACK PAIN WITHOUT SCIATICA: ICD-10-CM

## 2018-02-23 DIAGNOSIS — R29.898 WEAKNESS OF RIGHT LOWER EXTREMITY: ICD-10-CM

## 2018-02-23 DIAGNOSIS — R26.81 GAIT INSTABILITY: ICD-10-CM

## 2018-02-23 PROCEDURE — 97110 THERAPEUTIC EXERCISES: CPT

## 2018-02-23 NOTE — PROGRESS NOTES
"Name: Jacqueline O Favret  Clinic Number: [id number]  Date of Treatment: 2/23/2018  Diagnosis:   No diagnosis found.  Precautions: standard    Evaluation date: 2/08/18  Visit #: 4/12  Authorization period expiration: 4/08/18    Subjective: Jacqueline O Favret reports occassional R sided buttocks pain but not how it has been.  Patient reports her pain to be 2/10 on a 0-10 scale with 0 being no pain and 10 being the worst pain imaginable.      Objective:  Patient was educated and performed therapy to increase strength, flexibility, posture and core stabilization with activities as follows:     Jacqueline O Favret was educated and performed therapeutic exercises to develop strength, flexibility, posture and core stabilization for 55 total minutes including:     One on one ther ex x 30 minutes    Piriformis stretch 3 x 30"  Push/pull 3 x 5"  Bridges 3 reps  Resisted hip adduction 5"  IT band stretch 2 x 30"  PPT w/ TA activation 20 x 5"  Bridges 2 x 10 x 3"  Knee fall outs w/ TA activation w/ YTB 2 x 8 x 5"  Clamshells with YTB 25 x 3"  SKTC 3 x 30"   DKTC 3 x 30"  quad stretches 3 x 30"  Standing hip ext 2 x 10 reps  Standing hip abd 2 x 10 reps      Written Home Exercises Provided:   Posterior pelvic tilts  Single knee to chest  Double knee to chest  Prone quad stretches     Pt demo good understanding of the education provided. Jacqueline O Favret demonstrated good return demonstration of activities.     Assessment:   Pt completed therapeutic exercises without c/o pain. Pt will continue to benefit from skilled PT intervention. Medical Necessity is demonstrated by:  Pain limits function of effected part for some activities, Unable to participate fully in daily activities, Requires skilled supervision to complete and progress HEP and Weakness.    Patient is making good progress towards established goals.    New/Revised goals: continue with current goals   GOALS:  6-8 weeks (4/8/18). Pt agrees with goals set.  1. Independent " with HEP.  2. Report decreased low back and R LE pain < or = 3/10 with adls such as increased walking distances, getting out of bed in the morning, and prolonged standing.  3. Increased MMT for B LE by 1 muscle grade to promote proper pelvic stability to decrease low back and R LE pain < or = 3/10 with adls such as increased walking distances, getting out of bed in the morning, and prolonged standing.     4. Increased flexibility in R piriformis, B quads, R IT band, and R hip flexors to promote proper pelvic stability to decrease low back and R LE pain < or = 3/10 with adls such as increased walking distances, getting out of bed in the morning, and prolonged standing.  5. Patient to achieve CK (at least 40% < 60% impaired, limited or restricted) level on the FOTO Outcomes Measurement System.      Plan: Continue with established Plan of Care towards PT goals.

## 2018-02-26 ENCOUNTER — CLINICAL SUPPORT (OUTPATIENT)
Dept: REHABILITATION | Facility: HOSPITAL | Age: 73
End: 2018-02-26
Attending: INTERNAL MEDICINE
Payer: MEDICARE

## 2018-02-26 DIAGNOSIS — R26.81 GAIT INSTABILITY: ICD-10-CM

## 2018-02-26 DIAGNOSIS — R29.898 WEAKNESS OF RIGHT LOWER EXTREMITY: ICD-10-CM

## 2018-02-26 DIAGNOSIS — M54.50 ACUTE RIGHT-SIDED LOW BACK PAIN WITHOUT SCIATICA: ICD-10-CM

## 2018-02-26 PROCEDURE — G8979 MOBILITY GOAL STATUS: HCPCS | Mod: CK

## 2018-02-26 PROCEDURE — G8978 MOBILITY CURRENT STATUS: HCPCS | Mod: CK

## 2018-02-26 PROCEDURE — 97110 THERAPEUTIC EXERCISES: CPT

## 2018-02-26 NOTE — PROGRESS NOTES
"Name: Jacqueline O Favret  Clinic Number: [id number]  Date of Treatment: 2/26/2018  Diagnosis:     ICD-10-CM ICD-9-CM    1. Gait instability R26.81 781.2    2. Acute right-sided low back pain without sciatica M54.5 724.2    3. Weakness of right lower extremity R29.898 729.89      Precautions: standard    Evaluation date: 2/08/18  Visit #: 5/12  Authorization period expiration: 4/08/18    Subjective: Jacqueline O Favret reports improvement in her pain.  Patient reports her pain to be 1/10 on a 0-10 scale with 0 being no pain and 10 being the worst pain imaginable.    Objective: arrived to therapy in pelvic dysfunction. R anterior rotated pelvis, L posterior rotated pelvis.  Patient was educated and performed therapy to increase strength, flexibility, posture and core stabilization with activities as follows:     Jacqueline O Favret was educated and performed therapeutic exercises to develop strength, flexibility, posture and core stabilization for 59 total minutes including:     One on one ther ex x 30 minutes    Piriformis stretch 3 x 30"  Push/pull 3 x 5"  Bridges 3 reps  Resisted hip adduction 5"  IT band stretch 2 x 30"  PPT w/ TA activation 20 x 5"  Bridges 2 x 10 x 3"  Knee fall outs w/ TA activation w/ YTB 3 x 8 x 5"  Clamshells with YTB 25 x 5"  SKTC 3 x 30"   DKTC 3 x 30"  [place] hip add 3 x 10 reps with 30#  [place] hip abd 3 x 10 reps with 30#  quad stretches 3 x 30"  Standing hip ext 2 x 10 reps  Standing hip abd 2 x 10 reps    Functional Limitations  Reports - G Codes    Category:  Mobility   Tool:  FOTO Outcomes Measurement System.   Score:  Patient scored out 66 of 100 on the FOTO Outcomes Measurement System.   Current:   CK at least 40% < 60% impaired, limited or restricted   Goal:   CK at least 40% < 60% impaired, limited or restricted         Written Home Exercises Provided:     Assessment:   Patient continuing to benefit from OP PT and progress to below listed goals.     Pt will continue " to benefit from skilled PT intervention. Medical Necessity is demonstrated by:  Pain limits function of effected part for some activities, Unable to participate fully in daily activities, Requires skilled supervision to complete and progress HEP and Weakness.    Patient is making good progress towards established goals.    New/Revised goals: continue with current goals   GOALS:  6-8 weeks (4/8/18). Pt agrees with goals set.  1. Independent with HEP.  2. Report decreased low back and R LE pain < or = 3/10 with adls such as increased walking distances, getting out of bed in the morning, and prolonged standing.  3. Increased MMT for B LE by 1 muscle grade to promote proper pelvic stability to decrease low back and R LE pain < or = 3/10 with adls such as increased walking distances, getting out of bed in the morning, and prolonged standing.     4. Increased flexibility in R piriformis, B quads, R IT band, and R hip flexors to promote proper pelvic stability to decrease low back and R LE pain < or = 3/10 with adls such as increased walking distances, getting out of bed in the morning, and prolonged standing.  5. Patient to achieve CK (at least 40% < 60% impaired, limited or restricted) level on the FOTO Outcomes Measurement System. (MET)      Plan: Continue with established Plan of Care towards PT goals.

## 2018-02-28 ENCOUNTER — PATIENT MESSAGE (OUTPATIENT)
Dept: REHABILITATION | Facility: HOSPITAL | Age: 73
End: 2018-02-28

## 2018-03-01 ENCOUNTER — TELEPHONE (OUTPATIENT)
Dept: INTERNAL MEDICINE | Facility: CLINIC | Age: 73
End: 2018-03-01

## 2018-03-01 NOTE — TELEPHONE ENCOUNTER
----- Message from Berna Small sent at 3/1/2018  8:01 AM CST -----  Contact: self/[phone]  Pt called in regards to getting a order to do a urinalysis due to having a foaming and frothy urine.        Please advise  Please advise

## 2018-03-02 ENCOUNTER — CLINICAL SUPPORT (OUTPATIENT)
Dept: REHABILITATION | Facility: HOSPITAL | Age: 73
End: 2018-03-02
Attending: INTERNAL MEDICINE
Payer: MEDICARE

## 2018-03-02 ENCOUNTER — TELEPHONE (OUTPATIENT)
Dept: INTERNAL MEDICINE | Facility: CLINIC | Age: 73
End: 2018-03-02

## 2018-03-02 DIAGNOSIS — R82.998 FROTHY URINE: Primary | ICD-10-CM

## 2018-03-02 DIAGNOSIS — R26.81 GAIT INSTABILITY: ICD-10-CM

## 2018-03-02 DIAGNOSIS — M54.50 ACUTE RIGHT-SIDED LOW BACK PAIN WITHOUT SCIATICA: ICD-10-CM

## 2018-03-02 DIAGNOSIS — R29.898 WEAKNESS OF RIGHT LOWER EXTREMITY: ICD-10-CM

## 2018-03-02 PROCEDURE — 97110 THERAPEUTIC EXERCISES: CPT

## 2018-03-02 NOTE — TELEPHONE ENCOUNTER
----- Message from Berna Walden sent at 3/2/2018  8:38 AM CST -----  Contact: patient [phone]  Patient called to request a urinalysis . Pt has no pain but stated that her insurance looks frothy and foamy. Patient left a message yesterday about this same concern and would like a call back today.

## 2018-03-02 NOTE — PROGRESS NOTES
"Name: Jacqueline O Favret  Clinic Number: [id number]  Date of Treatment: 3/2/2018  Diagnosis:   No diagnosis found.  Precautions: standard    Evaluation date: 2/08/18  Visit #: 6/12  Authorization period expiration: 4/08/18    Subjective: Jacqueline O Favret reports improvement in her pain after feeling strong pain from doing a lot of walking while shopping.  Patient reports her pain to be 1/10 on a 0-10 scale with 0 being no pain and 10 being the worst pain imaginable.    Objective: arrived to therapy in pelvic dysfunction. R anterior rotated pelvis, L posterior rotated pelvis.  Patient was educated and performed therapy to increase strength, flexibility, posture and core stabilization with activities as follows:     Jacqueline O Favret was educated and performed therapeutic exercises to develop strength, flexibility, posture and core stabilization for 60 total minutes including:     One on one ther ex x 30 minutes    Piriformis stretch 3 x 30"   Push/pull 3 x 5"  Bridges 3 reps  Resisted hip adduction 5"  IT band stretch 2 x 30"  PPT w/ TA activation 20 x 5"  Bridges 2 x 10 x 5"  Knee fall outs w/ TA activation w/ YTB 3 x 10 x 5"  Clamshells with YTB 30 x 5"  SKTC 3 x 30"    DKTC 3 x 30"  [place] hip add 3 x 10 reps with 30#  [place] hip abd 3 x 10 reps with 30#  quad stretches 3 x 30"  Standing hip ext 2 x 10 reps  Standing hip abd 2 x 10 reps  Heel raises with glut set 2 x 15 x 5"    Functional Limitations  Reports - G Codes    Category:  Mobility   Tool:  FOTO Outcomes Measurement System.   Score:  Patient scored out 66 of 100 on the FOTO Outcomes Measurement System.   Current:   CK at least 40% < 60% impaired, limited or restricted   Goal:   CK at least 40% < 60% impaired, limited or restricted         Written Home Exercises Provided:     Assessment:   Patient continuing to perform HEP on a regular basis and demonstrates good recall and form with therapeutic exercises.     Pt will continue to benefit " from skilled PT intervention. Medical Necessity is demonstrated by:  Pain limits function of effected part for some activities, Unable to participate fully in daily activities, Requires skilled supervision to complete and progress HEP and Weakness.    Patient is making good progress towards established goals.    New/Revised goals: continue with current goals   GOALS:  6-8 weeks (4/8/18). Pt agrees with goals set.  1. Independent with HEP.  2. Report decreased low back and R LE pain < or = 3/10 with adls such as increased walking distances, getting out of bed in the morning, and prolonged standing.  3. Increased MMT for B LE by 1 muscle grade to promote proper pelvic stability to decrease low back and R LE pain < or = 3/10 with adls such as increased walking distances, getting out of bed in the morning, and prolonged standing.     4. Increased flexibility in R piriformis, B quads, R IT band, and R hip flexors to promote proper pelvic stability to decrease low back and R LE pain < or = 3/10 with adls such as increased walking distances, getting out of bed in the morning, and prolonged standing.  5. Patient to achieve CK (at least 40% < 60% impaired, limited or restricted) level on the FOTO Outcomes Measurement System. (MET)      Plan: Continue with established Plan of Care towards PT goals.

## 2018-03-02 NOTE — TELEPHONE ENCOUNTER
Spoke with patient patient about below message, she states she saw NP with [facility] and recommended, UA be ordered.

## 2018-03-05 ENCOUNTER — CLINICAL SUPPORT (OUTPATIENT)
Dept: REHABILITATION | Facility: HOSPITAL | Age: 73
End: 2018-03-05
Attending: INTERNAL MEDICINE
Payer: MEDICARE

## 2018-03-05 DIAGNOSIS — M54.50 ACUTE RIGHT-SIDED LOW BACK PAIN WITHOUT SCIATICA: ICD-10-CM

## 2018-03-05 DIAGNOSIS — R29.898 WEAKNESS OF RIGHT LOWER EXTREMITY: ICD-10-CM

## 2018-03-05 DIAGNOSIS — R26.81 GAIT INSTABILITY: ICD-10-CM

## 2018-03-05 PROCEDURE — 97110 THERAPEUTIC EXERCISES: CPT

## 2018-03-05 NOTE — PROGRESS NOTES
"Name: Jacqueline O Favret  Clinic Number: [id number]  Date of Treatment: 3/5/2018  Diagnosis:     ICD-10-CM ICD-9-CM    1. Gait instability R26.81 781.2    2. Acute right-sided low back pain without sciatica M54.5 724.2    3. Weakness of right lower extremity R29.898 729.89      Precautions: standard    Evaluation date: 2/08/18  Visit #: 7/12  Authorization period expiration: 4/08/18    Subjective: Jacqueline O Favret reports her pain to be 0/10 on a 0-10 scale with 0 being no pain and 10 being the worst pain imaginable.    Objective: arrived to therapy in pelvic dysfunction. R anterior rotated pelvis, L posterior rotated pelvis.  Patient was educated and performed therapy to increase strength, flexibility, posture and core stabilization with activities as follows:     Jacqueline O Favret was educated and performed therapeutic exercises to develop strength, flexibility, posture and core stabilization for 54 total minutes including:     One on one ther ex x 27 minutes    Piriformis stretch 3 x 30"   Push/pull 3 x 5"  Bridges 3 reps  Resisted hip adduction 5"  IT band stretch 2 x 30"  PPT w/ TA activation 20 x 10"  Bridges 2 x 10 x 5"  Knee fall outs w/ TA activation w/ OTB 3 x 10 x 5"  Clamshells with OTB 30 x 5"  SKTC 3 x 30"    DKTC 3 x 30"  [place] hip add 3 x 10 reps with 30#  N[place] hip abd 3 x 10 reps with 30#  quad stretches 3 x 30"  Standing hip ext 2 x 10 reps  Standing hip abd 2 x 10 reps  Heel raises with glut set 2 x 15 x 5"      Written Home Exercises Provided:     Assessment:   R hip abductor weakness noted with standing hip abduction exercises. Pelvis drops to the L in R stance.     Pt will continue to benefit from skilled PT intervention. Medical Necessity is demonstrated by:  Pain limits function of effected part for some activities, Unable to participate fully in daily activities, Requires skilled supervision to complete and progress HEP and Weakness.    Patient is making good progress towards " established goals.    New/Revised goals: continue with current goals   GOALS:  6-8 weeks (4/8/18). Pt agrees with goals set.  1. Independent with HEP.  2. Report decreased low back and R LE pain < or = 3/10 with adls such as increased walking distances, getting out of bed in the morning, and prolonged standing.  3. Increased MMT for B LE by 1 muscle grade to promote proper pelvic stability to decrease low back and R LE pain < or = 3/10 with adls such as increased walking distances, getting out of bed in the morning, and prolonged standing.     4. Increased flexibility in R piriformis, B quads, R IT band, and R hip flexors to promote proper pelvic stability to decrease low back and R LE pain < or = 3/10 with adls such as increased walking distances, getting out of bed in the morning, and prolonged standing.  5. Patient to achieve CK (at least 40% < 60% impaired, limited or restricted) level on the FOTO Outcomes Measurement System. (MET)      Plan: Continue with established Plan of Care towards PT goals.

## 2018-03-09 ENCOUNTER — CLINICAL SUPPORT (OUTPATIENT)
Dept: REHABILITATION | Facility: HOSPITAL | Age: 73
End: 2018-03-09
Attending: INTERNAL MEDICINE
Payer: MEDICARE

## 2018-03-09 DIAGNOSIS — R26.81 GAIT INSTABILITY: ICD-10-CM

## 2018-03-09 DIAGNOSIS — M54.50 ACUTE RIGHT-SIDED LOW BACK PAIN WITHOUT SCIATICA: ICD-10-CM

## 2018-03-09 DIAGNOSIS — R29.898 WEAKNESS OF RIGHT LOWER EXTREMITY: ICD-10-CM

## 2018-03-09 PROCEDURE — 97110 THERAPEUTIC EXERCISES: CPT

## 2018-03-09 NOTE — PROGRESS NOTES
"Name: Jacqueline O Favret  Clinic Number: [id number]  Date of Treatment: 3/9/2018  Diagnosis:     ICD-10-CM ICD-9-CM    1. Gait instability R26.81 781.2    2. Acute right-sided low back pain without sciatica M54.5 724.2    3. Weakness of right lower extremity R29.898 729.89      Precautions: standard    Evaluation date: 2/08/18  Visit #: 8/12  Authorization period expiration: 4/08/18    Subjective: Jacqueline O Favret reports her pain to be 0.5/10 on a 0-10 scale with 0 being no pain and 10 being the worst pain imaginable.    Objective: arrived to therapy in proper pelvic alignment  Patient was educated and performed therapy to increase strength, flexibility, posture and core stabilization with activities as follows:     Jacqueline O Favret was educated and performed therapeutic exercises to develop strength, flexibility, posture and core stabilization for 60 total minutes including:     One on one ther ex x 29 minutes    Piriformis stretch 3 x 30"   IT band stretch 2 x 30"  PPT w/ TA activation 20 x 10"  Bridges 2 x 10 x 5"  Knee fall outs w/ TA activation w/ OTB 3 x 10 x 5"  Clamshells with OTB 30 x 5"  SKTC 3 x 30"    DKTC 3 x 30"  quad stretches 3 x 30"  Standing hip ext 2 x 10 reps  Standing hip abd 2 x 10 reps  Heel raises with glut set 2 x 15 x 5"    Not performed today:  Push/pull 3 x 5"  Bridges 3 reps  Resisted hip adduction 5"    Written Home Exercises Provided:     Assessment:   Patient experiencing decreased pain. Will benefit from continued OP PT for LE strengthening and core strengthening to achieve below listed goals.     Pt will continue to benefit from skilled PT intervention. Medical Necessity is demonstrated by:  Pain limits function of effected part for some activities, Unable to participate fully in daily activities, Requires skilled supervision to complete and progress HEP and Weakness.    Patient is making good progress towards established goals.    New/Revised goals: continue with current " goals   GOALS:  6-8 weeks (4/8/18). Pt agrees with goals set.  1. Independent with HEP.  2. Report decreased low back and R LE pain < or = 3/10 with adls such as increased walking distances, getting out of bed in the morning, and prolonged standing.  3. Increased MMT for B LE by 1 muscle grade to promote proper pelvic stability to decrease low back and R LE pain < or = 3/10 with adls such as increased walking distances, getting out of bed in the morning, and prolonged standing.     4. Increased flexibility in R piriformis, B quads, R IT band, and R hip flexors to promote proper pelvic stability to decrease low back and R LE pain < or = 3/10 with adls such as increased walking distances, getting out of bed in the morning, and prolonged standing.  5. Patient to achieve CK (at least 40% < 60% impaired, limited or restricted) level on the FOTO Outcomes Measurement System. (MET)      Plan: Continue with established Plan of Care towards PT goals.

## 2018-03-12 ENCOUNTER — CLINICAL SUPPORT (OUTPATIENT)
Dept: REHABILITATION | Facility: HOSPITAL | Age: 73
End: 2018-03-12
Attending: INTERNAL MEDICINE
Payer: MEDICARE

## 2018-03-12 ENCOUNTER — HOSPITAL ENCOUNTER (OUTPATIENT)
Dept: RADIOLOGY | Facility: CLINIC | Age: 73
Discharge: HOME | End: 2018-03-12
Attending: INTERNAL MEDICINE
Payer: MEDICARE

## 2018-03-12 DIAGNOSIS — M54.50 ACUTE RIGHT-SIDED LOW BACK PAIN WITHOUT SCIATICA: ICD-10-CM

## 2018-03-12 DIAGNOSIS — Z13.820 SCREENING FOR OSTEOPOROSIS: ICD-10-CM

## 2018-03-12 DIAGNOSIS — R26.81 GAIT INSTABILITY: Primary | ICD-10-CM

## 2018-03-12 DIAGNOSIS — R29.898 WEAKNESS OF RIGHT LOWER EXTREMITY: ICD-10-CM

## 2018-03-12 PROCEDURE — 77080 DXA BONE DENSITY AXIAL: CPT | Mod: 26,,, | Performed by: INTERNAL MEDICINE

## 2018-03-12 PROCEDURE — 97110 THERAPEUTIC EXERCISES: CPT

## 2018-03-12 PROCEDURE — 77080 DXA BONE DENSITY AXIAL: CPT | Mod: TC

## 2018-03-12 NOTE — PROGRESS NOTES
"Name: Jacqueline O Favret  Clinic Number: [id number]  Date of Treatment: 3/12/2018  Diagnosis:   No diagnosis found.  Precautions: standard    Evaluation date: 2/08/18  Visit #: 9/12  Authorization period expiration: 4/08/18    Subjective:  Pt reports w/ no c/o LBP.      Objective: arrived to therapy in proper pelvic alignment  Patient was educated and performed therapy to increase strength, flexibility, posture and core stabilization with activities as follows:     Jacqueline O Favret was educated and performed therapeutic exercises to develop strength, flexibility, posture and core stabilization for 30 total minutes including:     One on one ther ex x  minutes    Piriformis stretch 3 x 30"   IT band stretch 2 x 30"  PPT w/ TA activation 20 x 10"  Bridges 2 x 10 x 5"  Knee fall outs w/ TA activation w/ OTB 3 x 10 x 5" np  Clamshells with OTB 30 x 5"  Lat step 2 x 10 w/ TA   SKTC 3 x 30"    DKTC 3 x 30" on ball   quad stretches 3 x 30"  Standing hip ext 2 x 10 reps np  Standing hip abd 3 x 10 reps  Heel raises with glut set 2 x 15 x 5"    Not performed today:  Push/pull 3 x 5"  Bridges 3 reps  Resisted hip adduction 5"    Written Home Exercises Provided:     Assessment:   Pt samira tx well w/ no c/o pn.  Pt demonstrated increased endurance and strength during therex w/ Vcs for technique.  Pt edu on nad instructed to cont HEP.  Cont to progress as samira.       Pt will continue to benefit from skilled PT intervention. Medical Necessity is demonstrated by:  Pain limits function of effected part for some activities, Unable to participate fully in daily activities, Requires skilled supervision to complete and progress HEP and Weakness.    Patient is making good progress towards established goals.    New/Revised goals: continue with current goals   GOALS:  6-8 weeks (4/8/18). Pt agrees with goals set.  1. Independent with HEP.  2. Report decreased low back and R LE pain < or = 3/10 with adls such as increased walking distances, " getting out of bed in the morning, and prolonged standing.  3. Increased MMT for B LE by 1 muscle grade to promote proper pelvic stability to decrease low back and R LE pain < or = 3/10 with adls such as increased walking distances, getting out of bed in the morning, and prolonged standing.     4. Increased flexibility in R piriformis, B quads, R IT band, and R hip flexors to promote proper pelvic stability to decrease low back and R LE pain < or = 3/10 with adls such as increased walking distances, getting out of bed in the morning, and prolonged standing.  5. Patient to achieve CK (at least 40% < 60% impaired, limited or restricted) level on the FOTO Outcomes Measurement System. (MET)      Plan: Continue with established Plan of Care towards PT goals.

## 2018-03-14 ENCOUNTER — CLINICAL SUPPORT (OUTPATIENT)
Dept: REHABILITATION | Facility: HOSPITAL | Age: 73
End: 2018-03-14
Attending: INTERNAL MEDICINE
Payer: MEDICARE

## 2018-03-14 DIAGNOSIS — R29.898 WEAKNESS OF RIGHT LOWER EXTREMITY: ICD-10-CM

## 2018-03-14 DIAGNOSIS — R26.81 GAIT INSTABILITY: ICD-10-CM

## 2018-03-14 DIAGNOSIS — M54.50 ACUTE RIGHT-SIDED LOW BACK PAIN WITHOUT SCIATICA: ICD-10-CM

## 2018-03-14 PROCEDURE — G8979 MOBILITY GOAL STATUS: HCPCS | Mod: CK

## 2018-03-14 PROCEDURE — G8978 MOBILITY CURRENT STATUS: HCPCS | Mod: CK

## 2018-03-14 PROCEDURE — 97110 THERAPEUTIC EXERCISES: CPT

## 2018-03-14 NOTE — PLAN OF CARE
Name: Jacqueline O Favret   Physician:Kristina Orozco MD  Date of eval:2/8/2018  Clinic Number: [id number]  Date of Treatment: 3/14/2018  Diagnosis:     ICD-10-CM ICD-9-CM    1. Gait instability R26.81 781.2    2. Acute right-sided low back pain without sciatica M54.5 724.2    3. Weakness of right lower extremity R29.898 729.89      Precautions: standard    Evaluation date: 2/08/18  Visit #: 10/12  Authorization period expiration: 4/08/18    Chief complaint: Patient is a 73 yo WF referred to OP PT secondary R low back pain, pain in R buttocks, and R LE weakness.    Subjective: Patient reports increased pain today. States she has had increased low back pain since her last PT session. Rates her low back pain at 4 out of 10 today    Objective: arrived to therapy in pelvic dysfunction  Patient was educated and performed therapy to increase strength, flexibility, posture and core stabilization with activities as follows:     AROM  LE MMT  R  L    Hip flexion  4+/5  4+/5    Hip abduction  3-/5  4/5    Hip extension  4-/5  4-/5    Hip ER  5/5  5/5    Hip IR  5/5  5/5    Knee extension  5/5  5/5    Knee flexion  5/5  5/5    Ankle dorsiflexion  5/5  5/5    Ankle plantar flexion  5/5  5/5          Flexibility testing:  - hamstrings:     B: WNL  - piriformis:        B: WNL  - quadriceps:     B: tight, decreased 25%  - hip adductors: B: WNL  - hip flexors:      R: tight, decreased 25%  - IT bands:         B: WFL    Other:  Functional Limitations  Reports - G Codes     Category:  Mobility   Tool:  FOTO Outcomes Measurement System.   Score:  Patient scored out 49 of 100 on the FOTO Outcomes Measurement System.   Current:   CK at least 40% < 60% impaired, limited or restricted   Goal:   CK at least 40% < 60% impaired, limited or restricted       Marli MÁRQUEZ Kaylagiana was educated and performed therapeutic exercises to develop strength, flexibility, posture and core stabilization for 56 total minutes including:  "    Reassessed strength, flexibility, and G code.    Piriformis stretch 3 x 30"   Push/pull 3 x 3"  Bridging 3 reps  Resisted hip add x 5"    IT band stretch 2 x 30"  PPT w/ TA activation 20 x 10"  Bridges 2 x 10 x 5"  Knee fall outs w/ TA activation w/ OTB 3 x 10 x 5"   Clamshells with OTB 30 x 5"  Lat step 2 x 10 w/ TA   SKTC 3 x 30"    DKTC 3 x 30" on ball   quad stretches 3 x 30"  Standing hip ext 2 x 10 reps np  Standing hip abd 3 x 10 reps  Heel raises with glut set 2 x 15 x 5"    Written Home Exercises Provided: No New HEP    Assessment:   Pt arrived to therapy in pelvic dysfunction. Push/pull MET corrected alignment and decreased pt's back pain. PT provided manual and verbal cues for correct technique for Push/Pull MET. Patient will benefit from continued OP PT to achieve below listed goals. Still presenting with weakness in R hip abductors.     Pt will continue to benefit from skilled PT intervention. Medical Necessity is demonstrated by:  Pain limits function of effected part for some activities, Unable to participate fully in daily activities, Requires skilled supervision to complete and progress HEP and Weakness.    Patient is making good progress towards established goals.    New/Revised goals: continue with current goals   GOALS:  6-8 weeks (4/8/18). Pt agrees with goals set.  1. Independent with HEP.  2. Report decreased low back and R LE pain < or = 3/10 with adls such as increased walking distances, getting out of bed in the morning, and prolonged standing.  3. Increased MMT for B LE by 1 muscle grade to promote proper pelvic stability to decrease low back and R LE pain < or = 3/10 with adls such as increased walking distances, getting out of bed in the morning, and prolonged standing.     4. Increased flexibility in R piriformis, B quads, R IT band, and R hip flexors to promote proper pelvic stability to decrease low back and R LE pain < or = 3/10 with adls such as increased walking distances, " getting out of bed in the morning, and prolonged standing.  5. Patient to achieve CK (at least 40% < 60% impaired, limited or restricted) level on the FOTO Outcomes Measurement System. (MET)      Plan: Continue with established Plan of Care towards PT goals.     I certify the need for these services furnished under this plan of treatment and while under my care.        ____________________________________ Physician/Referring Practitioner            _____________________ Date of Signature

## 2018-03-16 RX ORDER — ALPRAZOLAM 0.25 MG/1
TABLET ORAL
Qty: 20 TABLET | Refills: 0 | Status: SHIPPED | OUTPATIENT
Start: 2018-03-16 | End: 2018-05-07 | Stop reason: SDUPTHER

## 2018-03-19 ENCOUNTER — CLINICAL SUPPORT (OUTPATIENT)
Dept: REHABILITATION | Facility: HOSPITAL | Age: 73
End: 2018-03-19
Attending: INTERNAL MEDICINE
Payer: MEDICARE

## 2018-03-19 DIAGNOSIS — R29.898 WEAKNESS OF RIGHT LOWER EXTREMITY: ICD-10-CM

## 2018-03-19 DIAGNOSIS — M54.50 ACUTE RIGHT-SIDED LOW BACK PAIN WITHOUT SCIATICA: ICD-10-CM

## 2018-03-19 DIAGNOSIS — R26.81 GAIT INSTABILITY: Primary | ICD-10-CM

## 2018-03-19 PROCEDURE — 97110 THERAPEUTIC EXERCISES: CPT

## 2018-03-19 NOTE — PROGRESS NOTES
"  Name: Jacqueline O Favret   Physician:Kristina Orozco MD  Date of eval:2/8/2018  Clinic Number: [id number]  Date of Treatment: 3/14/2018  Diagnosis:       ICD-10-CM ICD-9-CM     1. Gait instability R26.81 781.2     2. Acute right-sided low back pain without sciatica M54.5 724.2     3. Weakness of right lower extremity R29.898 729.89        Precautions: standard     Evaluation date: 2/08/18  Visit #: 11/12  Authorization period expiration: 4/08/18     Chief complaint: Patient is a 73 yo WF referred to OP PT secondary R low back pain, pain in R buttocks, and R LE weakness.     Subjective:   Pt reports w/ mild discomfort in low back.  Pt mentioned having mod to intense muscle soreness gia last session.       Objective: arrived to therapy in pelvic dysfunction  Patient was educated and performed therapy to increase strength, flexibility, posture and core stabilization with activities as follows:      Other:  Functional Limitations  Reports - G Codes     Category:  Mobility   Tool:  FOTO Outcomes Measurement System.   Score:  Patient scored out 49 of 100 on the FOTO Outcomes Measurement System.   Current:   CK at least 40% < 60% impaired, limited or restricted   Goal:   CK at least 40% < 60% impaired, limited or restricted         Jacqueline O Favret was educated and performed therapeutic exercises to develop strength, flexibility, posture and core stabilization for 55 total minutes including:      Reassessed strength, flexibility, and G code.     Piriformis stretch 3 x 30"   Push/pull 3 x 3"  Bridging 3 reps  Resisted hip add x 5"  IT band stretch 3 x 20"  PPT w/ TA activation 30 x 10"  Bridges 2 x 15 x 5"  Knee fall outs w/ TA activation w/ OTB 3 x 10 x 5"   Clamshells with OTB 30 x 5"  Lat step 2 x 10 w/ TA   SKTC 3 x 30"    DKTC 3 x 30" on ball   quad stretches 3 x 30"  Standing hip ext 2 x 10 reps np  Standing hip abd 3 x 10 reps  Heel raises with glut set 3 x 10      Written Home Exercises Provided: " No New HEP     Assessment:   Pt displayed increased endurance during therex w/ VCs for technique.  Pt samira tx w/ no c/o pn.  Pt edu on and instructed to cont HEP.  Cont to progress as samira.    Patient will benefit from continued OP PT to achieve below listed goals. Still presenting with weakness in R hip abductors.      Pt will continue to benefit from skilled PT intervention. Medical Necessity is demonstrated by:  Pain limits function of effected part for some activities, Unable to participate fully in daily activities, Requires skilled supervision to complete and progress HEP and Weakness.     Patient is making good progress towards established goals.     New/Revised goals: continue with current goals   GOALS:  6-8 weeks (4/8/18). Pt agrees with goals set.  1. Independent with HEP.  2. Report decreased low back and R LE pain < or = 3/10 with adls such as increased walking distances, getting out of bed in the morning, and prolonged standing.  3. Increased MMT for B LE by 1 muscle grade to promote proper pelvic stability to decrease low back and R LE pain < or = 3/10 with adls such as increased walking distances, getting out of bed in the morning, and prolonged standing.     4. Increased flexibility in R piriformis, B quads, R IT band, and R hip flexors to promote proper pelvic stability to decrease low back and R LE pain < or = 3/10 with adls such as increased walking distances, getting out of bed in the morning, and prolonged standing.  5. Patient to achieve CK (at least 40% < 60% impaired, limited or restricted) level on the FOTO Outcomes Measurement System. (MET)        Plan: Continue with established Plan of Care towards PT goals.

## 2018-03-22 ENCOUNTER — CLINICAL SUPPORT (OUTPATIENT)
Dept: REHABILITATION | Facility: HOSPITAL | Age: 73
End: 2018-03-22
Attending: INTERNAL MEDICINE
Payer: MEDICARE

## 2018-03-22 DIAGNOSIS — R26.81 GAIT INSTABILITY: Primary | ICD-10-CM

## 2018-03-22 DIAGNOSIS — M54.50 ACUTE RIGHT-SIDED LOW BACK PAIN WITHOUT SCIATICA: ICD-10-CM

## 2018-03-22 DIAGNOSIS — R29.898 WEAKNESS OF RIGHT LOWER EXTREMITY: ICD-10-CM

## 2018-03-22 PROCEDURE — 97110 THERAPEUTIC EXERCISES: CPT

## 2018-03-22 NOTE — PROGRESS NOTES
"  Name: Jacqueline O Favret   Physician:Kristina Orozco MD  Date of eval:2/8/2018  Clinic Number: [id number]  Date of Treatment: 3/14/2018  Diagnosis:       ICD-10-CM ICD-9-CM     1. Gait instability R26.81 781.2     2. Acute right-sided low back pain without sciatica M54.5 724.2     3. Weakness of right lower extremity R29.898 729.89        Precautions: standard     Evaluation date: 2/08/18  Visit #: 12/12  Authorization period expiration: 4/08/18     Chief complaint: Patient is a 73 yo WF referred to OP PT secondary R low back pain, pain in R buttocks, and R LE weakness.     Subjective:   Pt states feeling well w/ no c/o pn in low back .       Objective: arrived to therapy in pelvic dysfunction  Patient was educated and performed therapy to increase strength, flexibility, posture and core stabilization with activities as follows:      Other:  Functional Limitations  Reports - G Codes     Category:  Mobility   Tool:  FOTO Outcomes Measurement System.   Score:  Patient scored out 49 of 100 on the FOTO Outcomes Measurement System.   Current:   CK at least 40% < 60% impaired, limited or restricted   Goal:   CK at least 40% < 60% impaired, limited or restricted         Jacqueline O Favret was educated and performed therapeutic exercises to develop strength, flexibility, posture and core stabilization for 30 total minutes including:      Reassessed strength, flexibility, and G code.     Piriformis stretch 3 x 30"   Push/pull 3 x 3"  Bridging 3 reps  Resisted hip add x 5"  IT band stretch 3 x 20"  PPT w/ TA activation 30 x 10"  Bridges 2 x 15 x 5"  Standing hip abd 3 x 10 reps  Clams shells 30 x 3 sec hold B   Lat step otb 20 ft x 2   LLR 10 x 10 sec hold w/ VCs  SKTC 3 x 30"    DKTC 3 x 30" on ball     NP:   Knee fall outs w/ TA activation w/ OTB 3 x 10 x 5"   Lat step 2 x 10 w/ TA   quad stretches 3 x 30"  Standing hip ext 2 x 10 reps np  Heel raises with glut set 3 x 10      Written Home Exercises " Provided: No New HEP     Assessment:   Pt samira tx w/ no c/o pn.  Pt demonstrated increased endurance during therex w/ VCs for technique.  Pt edu on and instructed to cont HEP.  Cont to progress as samira.    Patient will benefit from continued OP PT to achieve below listed goals. Still presenting with weakness in R hip abductors.      Pt will continue to benefit from skilled PT intervention. Medical Necessity is demonstrated by:  Pain limits function of effected part for some activities, Unable to participate fully in daily activities, Requires skilled supervision to complete and progress HEP and Weakness.     Patient is making good progress towards established goals.     New/Revised goals: continue with current goals   GOALS:  6-8 weeks (4/8/18). Pt agrees with goals set.  1. Independent with HEP.  2. Report decreased low back and R LE pain < or = 3/10 with adls such as increased walking distances, getting out of bed in the morning, and prolonged standing.  3. Increased MMT for B LE by 1 muscle grade to promote proper pelvic stability to decrease low back and R LE pain < or = 3/10 with adls such as increased walking distances, getting out of bed in the morning, and prolonged standing.     4. Increased flexibility in R piriformis, B quads, R IT band, and R hip flexors to promote proper pelvic stability to decrease low back and R LE pain < or = 3/10 with adls such as increased walking distances, getting out of bed in the morning, and prolonged standing.  5. Patient to achieve CK (at least 40% < 60% impaired, limited or restricted) level on the FOTO Outcomes Measurement System. (MET)        Plan: Continue with established Plan of Care towards PT goals.

## 2018-04-09 ENCOUNTER — CLINICAL SUPPORT (OUTPATIENT)
Dept: REHABILITATION | Facility: HOSPITAL | Age: 73
End: 2018-04-09
Attending: INTERNAL MEDICINE
Payer: MEDICARE

## 2018-04-09 DIAGNOSIS — R26.81 GAIT INSTABILITY: Primary | ICD-10-CM

## 2018-04-09 DIAGNOSIS — R29.898 WEAKNESS OF RIGHT LOWER EXTREMITY: ICD-10-CM

## 2018-04-09 DIAGNOSIS — M54.50 ACUTE RIGHT-SIDED LOW BACK PAIN WITHOUT SCIATICA: ICD-10-CM

## 2018-04-09 PROCEDURE — 97110 THERAPEUTIC EXERCISES: CPT

## 2018-04-09 NOTE — PROGRESS NOTES
"  Name: Jacqueline O Favret   Physician:Kristina Orozco MD  Date of eval:2/8/2018  Clinic Number: [id number]  Date of Treatment: 3/14/2018  Diagnosis:       ICD-10-CM ICD-9-CM     1. Gait instability R26.81 781.2     2. Acute right-sided low back pain without sciatica M54.5 724.2     3. Weakness of right lower extremity R29.898 729.89        Precautions: standard     Evaluation date: 2/08/18  Visit #: 13/12  Authorization period expiration: 4/08/18     Chief complaint: Patient is a 73 yo WF referred to OP PT secondary R low back pain, pain in R buttocks, and R LE weakness.     Subjective:   Pt reports w/ no c/o pn in low back.  Pt mentioned walking for long distances this past week w/ no c/o pn.       Objective: arrived to therapy in pelvic dysfunction  Patient was educated and performed therapy to increase strength, flexibility, posture and core stabilization with activities as follows:      Other:  Functional Limitations  Reports - G Codes     Category:  Mobility   Tool:  FOTO Outcomes Measurement System.   Score:  Patient scored out 49 of 100 on the FOTO Outcomes Measurement System.   Current:   CK at least 40% < 60% impaired, limited or restricted   Goal:   CK at least 40% < 60% impaired, limited or restricted         Jacqueline O Favret was educated and performed therapeutic exercises to develop strength, flexibility, posture and core stabilization for 55 total minutes including:      Reassessed strength, flexibility, and G code.     Piriformis stretch 3 x 30"   Push/pull 3 x 3"  Bridging 3 reps  Resisted hip add x 5"  IT band stretch 3 x 30"  PPT w/ TA activation 30 x 10"  Bridges 30 x 3 "   Standing hip abd and ext 2 x 15 and 3 x 10 reps  Clams shells 30 x 3 sec hold B np  Lat step otb2 x 50 ft  LLR 10 x 10 sec hold w/ VCs   SKTC 20 x 5"    DKTC 3 x 30" on ball   Heel raises with glut set 3 x 10    NP:   Knee fall outs w/ TA activation w/ OTB 3 x 10 x 5"   Lat step 2 x 10 w/ TA   quad stretches 3 " "x 30"  Standing hip ext 2 x 10 reps np        Written Home Exercises Provided: No New HEP     Assessment:   Pt displayed improved endurance and strength during therex w/ VCs for technique. Pt samira tx w/ no c/o pn.   Pt edu on and instructed to cont HEP.  Cont to progress as samira.    Patient will benefit from continued OP PT to achieve below listed goals. Still presenting with weakness in R hip abductors.      Pt will continue to benefit from skilled PT intervention. Medical Necessity is demonstrated by:  Pain limits function of effected part for some activities, Unable to participate fully in daily activities, Requires skilled supervision to complete and progress HEP and Weakness.     Patient is making good progress towards established goals.     New/Revised goals: continue with current goals   GOALS:  6-8 weeks (4/8/18). Pt agrees with goals set.  1. Independent with HEP.  2. Report decreased low back and R LE pain < or = 3/10 with adls such as increased walking distances, getting out of bed in the morning, and prolonged standing.  3. Increased MMT for B LE by 1 muscle grade to promote proper pelvic stability to decrease low back and R LE pain < or = 3/10 with adls such as increased walking distances, getting out of bed in the morning, and prolonged standing.     4. Increased flexibility in R piriformis, B quads, R IT band, and R hip flexors to promote proper pelvic stability to decrease low back and R LE pain < or = 3/10 with adls such as increased walking distances, getting out of bed in the morning, and prolonged standing.  5. Patient to achieve CK (at least 40% < 60% impaired, limited or restricted) level on the FOTO Outcomes Measurement System. (MET)        Plan: Continue with established Plan of Care towards PT goals.       "

## 2018-04-17 ENCOUNTER — CLINICAL SUPPORT (OUTPATIENT)
Dept: REHABILITATION | Facility: HOSPITAL | Age: 73
End: 2018-04-17
Attending: INTERNAL MEDICINE
Payer: MEDICARE

## 2018-04-17 DIAGNOSIS — R26.81 GAIT INSTABILITY: ICD-10-CM

## 2018-04-17 DIAGNOSIS — M54.50 ACUTE RIGHT-SIDED LOW BACK PAIN WITHOUT SCIATICA: ICD-10-CM

## 2018-04-17 DIAGNOSIS — R29.898 WEAKNESS OF RIGHT LOWER EXTREMITY: ICD-10-CM

## 2018-04-17 PROCEDURE — 97110 THERAPEUTIC EXERCISES: CPT

## 2018-04-17 PROCEDURE — G8979 MOBILITY GOAL STATUS: HCPCS | Mod: CJ

## 2018-04-17 PROCEDURE — G8980 MOBILITY D/C STATUS: HCPCS | Mod: CK

## 2018-04-17 NOTE — PROGRESS NOTES
Name: Jacqueline O Favret   Clinic Number: [id number]   Age: 72 y.o.   Diagnosis:   Encounter Diagnoses   Name Primary?    Gait instability     Acute right-sided low back pain without sciatica     Weakness of right lower extremity       Physician: Kristina Orozco MD   Original Orders : PT eval and treat and Therapeutic exercise  Initial visit: 2/8/18  Date of Last visit: 4/17/2018  Date of Discharge Note:  4/17/2018  Total Visits Received: 14  Missed Visits: 0    Subjective: denies pain this morning. States every once a a while she gets a little bit of pain, but nothing like before. States her gait is much better    Objective: Patient is a 71 yo WF referred to OP PT secondary R low back pain, pain in R buttocks, and R LE weakness.  Treatment :    Included:Therapeutic exercise and Stretching        AROM  LE MMT  R  L    Hip flexion  5/5  5/5    Hip abduction  4/5  5/5    Hip extension  5/5  5/5    Hip ER  5/5  5/5    Hip IR  5/5  5/5    Knee extension  5/5  5/5    Knee flexion  5/5  5/5    Ankle dorsiflexion  5/5  5/5    Ankle plantar flexion  5/5  5/5          Flexibility testing:  - hamstrings:     B: WNL  - piriformis:        B: WNL  - quadriceps:     B: WFL  - hip adductors: B: WNL  - hip flexors:      R: tight, decreased 25%  - IT bands:         B: WFL     Other:  Functional Limitations  Reports - G Codes     Category:  Mobility   Tool:  FOTO Outcomes Measurement System.   Score:  Patient scored out 67 of 100 on the FOTO Outcomes Measurement System.   Discharge:   CJ at least 20% < 40% impaired, limited or restricted   Goal:   CK at least 40% < 60% impaired, limited or restricted       Treatment today:  One on one there ex x 15 minutes  Reassessed strength, flexibility, and G code.    Assessment:  Patient has progressed well in Op PT and achieved below listed goals. Pt to continue with independent exercises at [facility].    Goals Achieved:   1. Independent with HEP.  2. Report  decreased low back and R LE pain < or = 3/10 with adls such as increased walking distances, getting out of bed in the morning, and prolonged standing.  3. Increased MMT for B LE by 1 muscle grade to promote proper pelvic stability to decrease low back and R LE pain < or = 3/10 with adls such as increased walking distances, getting out of bed in the morning, and prolonged standing.     4. Increased flexibility in R piriformis, B quads, R IT band, and R hip flexors to promote proper pelvic stability to decrease low back and R LE pain < or = 3/10 with adls such as increased walking distances, getting out of bed in the morning, and prolonged standing.  5. Patient to achieve CK (at least 40% < 60% impaired, limited or restricted) level on the FOTO Outcomes Measurement System.       Discharge reason : Met goals    Discharge plan :Continue HEP, Pt to follow-up with MD as planned and continue I exercise at [facility].    Plan:  This patient is discharged from Physical Therapy Services.

## 2018-05-09 RX ORDER — ALPRAZOLAM 0.25 MG/1
TABLET ORAL
Qty: 20 TABLET | Refills: 0 | Status: SHIPPED | OUTPATIENT
Start: 2018-05-09 | End: 2018-07-25 | Stop reason: SDUPTHER

## 2018-05-28 ENCOUNTER — OFFICE VISIT (OUTPATIENT)
Dept: OPTOMETRY | Facility: CLINIC | Age: 73
End: 2018-05-28
Payer: MEDICARE

## 2018-05-28 DIAGNOSIS — Z46.0 ENCOUNTER FOR FITTING OR ADJUSTMENT OF SPECTACLES OR CONTACT LENSES: Primary | ICD-10-CM

## 2018-05-28 PROCEDURE — 99499 UNLISTED E&M SERVICE: CPT | Mod: S$GLB,,, | Performed by: OPTOMETRIST

## 2018-05-28 PROCEDURE — 92310 CONTACT LENS FITTING OU: CPT | Mod: ,,, | Performed by: OPTOMETRIST

## 2018-05-28 NOTE — PROGRESS NOTES
HPI     Patient in for contact lens follow-up.    Contact lenses patient currently wearing:  OD Air Optix Aqua 8.6  +1.25                                                                       OS   Air Optix Aqua 8.6 +3.50    Patient's report on visual acuity with contact lenses:  Distance:  Not as   sharp any more - feels VA at distance better without CL in .                                                                                           Near No near complaints    Any problems with Contact Lens comfort?  No, put in new pair this morning    Contact lens wearing time (hours/per day): 8 hr/day but lately has been   wearing them for about 4 hrs/day    How long have Contact Lenses been in today?  3 hrs.    Does patient sleep with the contact lenses in?  No    Contact lens care system/solution used?  Opti-Free      Last edited by Jeromy Goodman, OD on 5/28/2018 10:35 AM. (History)            Assessment /Plan     For exam results, see Encounter Report.    1. Encounter for fitting or adjustment of spectacles or contact lenses                  Good contact lens fit in each eye with present Air Optix Aqua SCLs.  Wearing CLs well, but showing a need for power change/reduction in the right lens (for distance).  Power of the left lens (for near) okay as is.    Issued new contact lens Rx to Mrs. Favret.  Note switch from Air Optix Aqua to Air Optix with Hydraglyde material.  Issued new trial Air Optix with Hydraglyde with power change in the right lens - call/email after wearing new trial lenses for a few days to report on satisfaction with lens comfort and distance VA with power change.    Mrs. Weiner returned to me two unopened boxes of Air Optix Aqua SCLs (right for distance and left for near) which I will hold for her.  If she is happy with the Air Optix with HydraGlyde material, will ask CL department to switch out the lenses for her to Air Optix with HydraGlyde material.

## 2018-05-28 NOTE — PATIENT INSTRUCTIONS
Good contact lens fit in each eye with present Air Optix Aqua SCLs.  Wearing CLs well, but showing a need for power change/reduction in the right lens (for distance).  Power of the left lens (for near) okay as is.    Issued new contact lens Rx to Mrs. Favret.  Note switch from Air Optix Aqua to Air Optix with Hydraglyde material.  Issued new trial Air Optix with Hydraglyde with power change in the right lens - call/email after wearing new trial lenses for a few days to report on satisfaction with lens comfort and distance VA with power change.    Mrs. Weiner returned to me two unopened boxes of Air Optix Aqua SCLs (right for distance and left for near) which I will hold for her.  If she is happy with the Air Optix with HydraGlyde material, will ask CL department to switch out the lenses for her to Air Optix with HydraGlyde material.

## 2018-05-30 ENCOUNTER — OFFICE VISIT (OUTPATIENT)
Dept: PODIATRY | Facility: CLINIC | Age: 73
End: 2018-05-30
Payer: MEDICARE

## 2018-05-30 VITALS
HEIGHT: 67 IN | HEART RATE: 68 BPM | SYSTOLIC BLOOD PRESSURE: 137 MMHG | WEIGHT: 125 LBS | BODY MASS INDEX: 19.62 KG/M2 | DIASTOLIC BLOOD PRESSURE: 75 MMHG

## 2018-05-30 DIAGNOSIS — M20.12 VALGUS DEFORMITY OF BOTH GREAT TOES: ICD-10-CM

## 2018-05-30 DIAGNOSIS — G57.01 COMMON PERONEAL NEUROPATHY OF RIGHT LOWER EXTREMITY: Primary | ICD-10-CM

## 2018-05-30 DIAGNOSIS — M20.11 VALGUS DEFORMITY OF BOTH GREAT TOES: ICD-10-CM

## 2018-05-30 PROCEDURE — 3078F DIAST BP <80 MM HG: CPT | Mod: CPTII,S$GLB,,

## 2018-05-30 PROCEDURE — 3075F SYST BP GE 130 - 139MM HG: CPT | Mod: CPTII,S$GLB,,

## 2018-05-30 PROCEDURE — 99203 OFFICE O/P NEW LOW 30 MIN: CPT | Mod: S$GLB,,,

## 2018-05-30 NOTE — PROGRESS NOTES
Subjective:       Patient ID: Jacqueline O Favret is a 72 y.o. female.    Chief Complaint: Foot Pain (Left foot in top area of foot ) and Numbness (Right foot and leg)    HPI  Patient presents to the clinic reporting some tenderness in the left 1st MPJ however her main problem is numbness to the dorsal aspect of her right foot laterally.  She does report that she was diagnosed with degenerative disc disease about 10 years ago.  She also underwent a right bunionectomy in 2012.  She notes that she fractured her pelvis last year and underwent physical therapy was not operated upon.  She developed this numbness about 3 months ago and has persisted.  No injury other than the what I discussed.  No prior treatment.    Past Medical History:   Diagnosis Date    Abnormal liver enzymes 4/6/2015    Acute on chronic kidney disease, stage 3 4/18/2013    Alcohol-induced acute pancreatitis 8/16/2015    Anemia     Bunion, right foot 12/14/2012    Compression fracture 11/14/2013    MI (myocardial infarction) 1991    PAF (paroxysmal atrial fibrillation) 9/8/2015    During acute illness     SCC (squamous cell carcinoma) 05/11/2016    in situ left lower lip, nasal bridge       Past Surgical History:   Procedure Laterality Date    blephroplasty      BREAST BIOPSY Right     excisional 1985    BUNIONECTOMY  Jan 2013    right foot    CARDIAC CATHETERIZATION      COLONOSCOPY N/A 9/20/2016    Procedure: COLONOSCOPY;  Surgeon: Rachelle Guerra MD;  Location: [address]);  Service: Endoscopy;  Laterality: N/A;    COLONOSCOPY N/A 12/21/2016    Procedure: COLONOSCOPY;  Surgeon: Freedom Sandoval MD;  Location: [address]);  Service: Endoscopy;  Laterality: N/A;  PREP: PREPOPIK  Y[place]ANDA [place] IN [facility]    FOOT SURGERY      right foot    HYSTERECTOMY  1980s    bleeding    orbital fx      TONSILLECTOMY      VAGINA SURGERY         Family History   Problem Relation Age of Onset    Diabetes Mother     Heart  disease Father 57        sudden death       Social History     Social History    Marital status:      Spouse name: N/A    Number of children: N/A    Years of education: N/A     Social History Main Topics    Smoking status: Former Smoker     Packs/day: 1.00     Years: 15.00     Quit date: 4/6/2004    Smokeless tobacco: Never Used    Alcohol use 3.0 - 6.0 oz/week     5 - 10 Standard drinks or equivalent per week      Comment: 1 glass scotch a day    Drug use: No    Sexual activity: No     Other Topics Concern    None     Social History Narrative    None       Current Outpatient Prescriptions   Medication Sig Dispense Refill    amlodipine (NORVASC) 2.5 MG tablet Take 1 tablet (2.5 mg total) by mouth once daily. 90 tablet 3    co-enzyme Q-10 30 mg capsule Take 30 mg by mouth 3 (three) times daily.      Lactobacillus rhamnosus GG (CULTURELLE) 10 billion cell capsule Take 1 capsule by mouth once daily.      metoprolol tartrate (LOPRESSOR) 50 MG tablet Take 1 tablet (50 mg total) by mouth 2 (two) times daily. 180 tablet 3    multivitamin (ONE DAILY MULTIVITAMIN) per tablet Take 1 tablet by mouth once daily.      omeprazole (PRILOSEC) 20 MG capsule Take 1 capsule (20 mg total) by mouth once daily. 90 capsule 3    ramipril (ALTACE) 10 MG capsule Take 1 capsule (10 mg total) by mouth 2 (two) times daily. 180 capsule 3    rosuvastatin (CRESTOR) 20 MG tablet Take 1 tablet (20 mg total) by mouth once daily. 90 tablet 3    ALPRAZolam (XANAX) 0.25 MG tablet TAKE 1/2 TO 1 TABLET BY MOUTH BEFORE FLIGHT 20 tablet 0    amLODIPine (NORVASC) 5 MG tablet Take 1 tablet (5 mg total) by mouth once daily. 90 tablet 3    fexofenadine (ALLEGRA) 180 MG tablet Take by mouth daily as needed. 1 Tablet Oral Every day      mirtazapine (REMERON) 15 MG tablet TAKE 1 TABLET (15 MG TOTAL) BY MOUTH EVERY EVENING. 90 tablet 1    VENTOLIN HFA 90 mcg/actuation inhaler INHALE 1-2 PUFFS INTO THE LUNGS EVERY 4 (FOUR) HOURS AS  NEEDED.  1     No current facility-administered medications for this visit.        Review of patient's allergies indicates:  No Known Allergies    Review of Systems  ROS:  Constitution: Negative for chills, fever, weakness and malaise/fatigue.   HEENT: Negative for headaches.   Cardiovascular: Negative for chest pain and claudication.   Respiratory: Negative for cough and shortness of breath.   Musculoskeletal: Positive for bilateral foot pain.  Negative for muscle cramps and muscle weakness.   Gastrointestinal: Negative for nausea and vomiting.   Neurological:+ for numbness and paresthesias.   Dermatological: Negativefor skin rash, Negative for calluses, Negative for fungal nails, Negative for wound.        Objective:      Physical Exam  Constitutional:  Patient is oriented to person, place, and time. Vital signs are normal.  Appears well-developed and well-nourished.     Vascular:  Dorsalis pedis pulses are 2/4 on the right side, and 2/4 on the left side.   Posterior tibial pulses are 2/4 on the right side, and 2/4 on the left side.   Negative for digital hair growth, capillary fill time to all toes <3 seconds, toes are cool touch, trace pedal swelling    Skin/Dermatological:  Skin is warm and intact.  No cyanosis or clubbing.  No rashes noted.  No open wounds.  (--) keratosis     Musculoskeletal:      Hallux abducto valgus bilaterally, hammertoes 2-5 observed.  Pedal rom within normal limits.  (--) ankle joint DF restriction with both knee flexed and extened.    Neurological:  (+) deficits to sharp/dull, light touch or vibratory right common peroneal nerve distal to midfoot  Muscle strength to tibialis anterior, extensor hallucis longus, extensor digitorum longus, peroneal muscles, flexor hallucis/digotorum longus, posterior tibial and gastrosoleal complex is 5/5, normal tone without assymmetry   Patellar reflexes are 2+ on the right side and 2+ on the left side.  Achilles reflexes are 2+ on the right side and 2+  on the left side.        Assessment:       1. Common peroneal neuropathy of right lower extremity    2. Valgus deformity of both great toes        Plan:       Common peroneal neuropathy of right lower extremity  -     EMG W/ ULTRASOUND AND NERVE CONDUCTION TEST 2 Extremities; Future    Valgus deformity of both great toes        I have seen this patient for an EMG and nerve conduction test.  I believe this is actually related to her injury from her pelvis and possibly her degenerative disc disease.  Her bunions are mild and she was already operated upon and I do not see any reason to pursue any surgical intervention.  We discussed bunion Shields and wide shoes.  I will follow up with her once her studies are performed.

## 2018-05-31 ENCOUNTER — DOCUMENTATION ONLY (OUTPATIENT)
Dept: OPTOMETRY | Facility: CLINIC | Age: 73
End: 2018-05-31

## 2018-05-31 NOTE — PROGRESS NOTES
Assessment /Plan     For exam results, see Encounter Report.    Refractive error OU  Wears SCLs OU         Refer to previous optometry encounter notes dated 05/28/2018.  Received message from Ms. Favret stating that she is happy with the trial Air Optix with HydraGlyde SCLs dispensed at last visit, and she requests that the CL Rx be changed to Air Optix with HydraGlyde:        New CL Rx:  Air Optix with HydraGlyde             OD  8.6  14.2   +0.75                OS  8/6   14.2  +3.50  Daily wear.  Clean and soak lenses nightly.  Replace preferably twice per month.    New CL Rx entered into notes dated 05/28/2018 via addendum to those notes.    New CL Rx to be sent to Ms. Favret.    Jeromy Goodman, OD

## 2018-06-25 ENCOUNTER — HOSPITAL ENCOUNTER (EMERGENCY)
Facility: HOSPITAL | Age: 73
Discharge: HOME | End: 2018-06-25
Attending: EMERGENCY MEDICINE
Payer: MEDICARE

## 2018-06-25 ENCOUNTER — OFFICE VISIT (OUTPATIENT)
Dept: OTOLARYNGOLOGY | Facility: CLINIC | Age: 73
End: 2018-06-25
Payer: MEDICARE

## 2018-06-25 VITALS
TEMPERATURE: 99 F | HEART RATE: 96 BPM | DIASTOLIC BLOOD PRESSURE: 82 MMHG | BODY MASS INDEX: 19.3 KG/M2 | SYSTOLIC BLOOD PRESSURE: 156 MMHG | WEIGHT: 123.25 LBS

## 2018-06-25 VITALS
HEART RATE: 90 BPM | DIASTOLIC BLOOD PRESSURE: 85 MMHG | WEIGHT: 125 LBS | TEMPERATURE: 98 F | SYSTOLIC BLOOD PRESSURE: 180 MMHG | HEIGHT: 67 IN | OXYGEN SATURATION: 94 % | RESPIRATION RATE: 18 BRPM | BODY MASS INDEX: 19.62 KG/M2
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
## 2018-06-25 DIAGNOSIS — T18.128A FOOD IMPACTION OF ESOPHAGUS, INITIAL ENCOUNTER: Primary | ICD-10-CM

## 2018-06-25 DIAGNOSIS — R13.10 DYSPHAGIA, UNSPECIFIED TYPE: Primary | ICD-10-CM

## 2018-06-25 PROCEDURE — 31575 DIAGNOSTIC LARYNGOSCOPY: CPT | Mod: S$GLB,,, | Performed by: OTOLARYNGOLOGY

## 2018-06-25 PROCEDURE — 25000003 PHARM REV CODE 250: Performed by: EMERGENCY MEDICINE

## 2018-06-25 PROCEDURE — 63600175 PHARM REV CODE 636 W HCPCS: Mod: JG | Performed by: EMERGENCY MEDICINE

## 2018-06-25 PROCEDURE — 3074F SYST BP LT 130 MM HG: CPT | Mod: CPTII,S$GLB,, | Performed by: OTOLARYNGOLOGY

## 2018-06-25 PROCEDURE — 96374 THER/PROPH/DIAG INJ IV PUSH: CPT

## 2018-06-25 PROCEDURE — 99999 PR PBB SHADOW E&M-EST. PATIENT-LVL II: CPT | Mod: PBBFAC,,, | Performed by: OTOLARYNGOLOGY

## 2018-06-25 PROCEDURE — 96361 HYDRATE IV INFUSION ADD-ON: CPT

## 2018-06-25 PROCEDURE — 99283 EMERGENCY DEPT VISIT LOW MDM: CPT | Mod: 25

## 2018-06-25 PROCEDURE — 99213 OFFICE O/P EST LOW 20 MIN: CPT | Mod: 25,S$GLB,, | Performed by: OTOLARYNGOLOGY

## 2018-06-25 PROCEDURE — 3079F DIAST BP 80-89 MM HG: CPT | Mod: CPTII,S$GLB,, | Performed by: OTOLARYNGOLOGY

## 2018-06-25 PROCEDURE — 99284 EMERGENCY DEPT VISIT MOD MDM: CPT | Mod: ,,, | Performed by: EMERGENCY MEDICINE

## 2018-06-25 RX ORDER — GLUCAGON 1 MG
1 KIT INJECTION
Status: COMPLETED | OUTPATIENT
Start: 2018-06-25 | End: 2018-06-25

## 2018-06-25 RX ORDER — GLUCAGON 1 MG
1 KIT INJECTION
Status: DISCONTINUED | OUTPATIENT
Start: 2018-06-25 | End: 2018-06-25

## 2018-06-25 RX ADMIN — SODIUM CHLORIDE 1000 ML: 0.9 INJECTION, SOLUTION INTRAVENOUS at 05:06

## 2018-06-25 RX ADMIN — GLUCAGON 1 MG: KIT at 04:06

## 2018-06-25 NOTE — CONSULTS
[facility]  Gastroenterology  Consult Note    Patient Name: Jacqueline O Favret  MRN: [id number]  Admission Date: 6/25/2018  Hospital Length of Stay: 0 days  Code Status: Prior   Attending Provider: Say Falk DO   Consulting Provider: Saul Mcfarlane MD  Primary Care Physician: Kristina Orozco MD  Principal Problem:<principal problem not specified>    Inpatient consult to Gastroenterology  Consult performed by: SAUL MCFARLANE  Consult ordered by: SAY FALK        Subjective:     HPI:  This is a 71 yo F who presents with complaints of food bolus sensation. She reports she was eating steak yesterday for lunch and felt the sensation of a bite of steak getting stuck. After that she was unable to tolerate her secretions for a number of hours until she fell asleep. This morning she tried to swallow some water but it came back up. Throughout this period she never felt any chest discomfort. On arrival to ED, she reports she is able to tolerate secretions now and is not spitting up. She was administered glucagon 1mg IV about 20 minutes prior to my evaluation. She reports she does not feel food is stuck anymore. She was able to tolerate a cup of water without spitting it up or throwing it up.     Past Medical History:   Diagnosis Date    Abnormal liver enzymes 4/6/2015    Acute on chronic kidney disease, stage 3 4/18/2013    Alcohol-induced acute pancreatitis 8/16/2015    Anemia     Bunion, right foot 12/14/2012    Compression fracture 11/14/2013    MI (myocardial infarction) 1991    PAF (paroxysmal atrial fibrillation) 9/8/2015    During acute illness     SCC (squamous cell carcinoma) 05/11/2016    in situ left lower lip, nasal bridge       Past Surgical History:   Procedure Laterality Date    blephroplasty      BREAST BIOPSY Right     excisional 1985    BUNIONECTOMY  Jan 2013    right foot    CARDIAC CATHETERIZATION      COLONOSCOPY N/A 9/20/2016    Procedure: COLONOSCOPY;   Surgeon: Rachelle Guerra MD;  Location: [facility] ([address] [place] [address]);  Service: Endoscopy;  Laterality: N/A;    COLONOSCOPY N/A 12/21/2016    Procedure: COLONOSCOPY;  Surgeon: Freedom Sandoval MD;  Location: [facility] ([address] [place] [address]);  Service: Endoscopy;  Laterality: N/A;  PREP: PREPOPIK  SCGEDULKE EAFRLY IN [facility]    FOOT SURGERY      right foot    HYSTERECTOMY  1980s    bleeding    orbital fx      TONSILLECTOMY      VAGINA SURGERY         Review of patient's allergies indicates:  No Known Allergies  Family History     Problem Relation (Age of Onset)    Diabetes Mother    Heart disease Father (57)        Social History Main Topics    Smoking status: Former Smoker     Packs/day: 1.00     Years: 15.00     Quit date: 4/6/2004    Smokeless tobacco: Never Used    Alcohol use 3.0 - 6.0 oz/week     5 - 10 Standard drinks or equivalent per week      Comment: 1 glass scotch a day    Drug use: No    Sexual activity: No     Review of Systems   Constitutional: Positive for appetite change and fever. Negative for activity change and chills.   HENT: Negative for sore throat and trouble swallowing.    Respiratory: Positive for cough. Negative for shortness of breath.    Cardiovascular: Negative for chest pain and leg swelling.   Gastrointestinal: Negative for abdominal pain, blood in stool, constipation, diarrhea, nausea and vomiting.   Genitourinary: Negative for decreased urine volume and difficulty urinating.   Musculoskeletal: Negative for arthralgias and back pain.   Skin: Negative for color change and pallor.   Neurological: Negative for light-headedness.   Psychiatric/Behavioral: Negative for agitation and confusion.     Objective:     Vital Signs (Most Recent):  Temp: 98.1 °F (36.7 °C) (06/25/18 1537)  Pulse: 99 (06/25/18 1537)  Resp: 18 (06/25/18 1537)  BP: 113/64 (06/25/18 1537)  SpO2: (!) 94 % (06/25/18 1537) Vital Signs (24h Range):  Temp:  [98.1 °F (36.7 °C)-98.6 °F (37 °C)] 98.1 °F (36.7 °C)  Pulse:  [96-99]  99  Resp:  [18] 18  SpO2:  [94 %] 94 %  BP: (113-156)/(64-82) 113/64     Weight: 56.7 kg (125 lb) (06/25/18 1537)  Body mass index is 19.58 kg/m².    No intake or output data in the 24 hours ending 06/25/18 1746    Lines/Drains/Airways     Peripheral Intravenous Line                 Peripheral IV - Single Lumen 06/25/18 1635 Left Antecubital less than 1 day                Physical Exam   Constitutional: She is oriented to person, place, and time. She appears well-developed and well-nourished. No distress.   HENT:   Mouth/Throat: Oropharynx is clear and moist.   Able to drink water without spitting it up (witnessed by me)  Tolerating secretions   Eyes: No scleral icterus.   Cardiovascular: Normal rate and regular rhythm.    Pulmonary/Chest: Effort normal and breath sounds normal.   Abdominal: Soft. Bowel sounds are normal. She exhibits no distension and no mass. There is no tenderness. There is no guarding.   Musculoskeletal: She exhibits no edema or deformity.   Lymphadenopathy:     She has no cervical adenopathy.   Neurological: She is alert and oriented to person, place, and time.   Skin: Skin is warm and dry.   Psychiatric: She has a normal mood and affect.   Vitals reviewed.      Significant Labs:  CBC: No results for input(s): WBC, HGB, HCT, PLT in the last 48 hours.  CMP: No results for input(s): GLU, CALCIUM, ALBUMIN, PROT, NA, K, CO2, CL, BUN, CREATININE, ALKPHOS, ALT, AST, BILITOT in the last 48 hours.  Coagulation: No results for input(s): PT, INR, APTT in the last 48 hours.      Assessment/Plan:     Food impaction of esophagus    71 yo F with sensation of food impaction now able to tolerate secretions and water. Patient likely had steak food impaction of esophagus yesterday which has subsequently passed on its own or with glucagon. No evidence of obstruction or impaction at this time. Patient is aware that if symptoms develop again she will call the on-call GI clinic number.    Recommendations:  - Will  need EGD as an outpatient  - No indication for inpatient EGD at this time            Thank you for your consult. I will follow-up with patient. Please contact us if you have any additional questions.    Saul Ochoa MD  Gastroenterology  [facility]

## 2018-06-25 NOTE — DISCHARGE INSTRUCTIONS
If you have any concerns or difficulty swallowing or if your symptoms do return please return to the emergency department.

## 2018-06-25 NOTE — SUBJECTIVE & OBJECTIVE
Past Medical History:   Diagnosis Date    Abnormal liver enzymes 4/6/2015    Acute on chronic kidney disease, stage 3 4/18/2013    Alcohol-induced acute pancreatitis 8/16/2015    Anemia     Bunion, right foot 12/14/2012    Compression fracture 11/14/2013    MI (myocardial infarction) 1991    PAF (paroxysmal atrial fibrillation) 9/8/2015    During acute illness     SCC (squamous cell carcinoma) 05/11/2016    in situ left lower lip, nasal bridge       Past Surgical History:   Procedure Laterality Date    blephroplasty      BREAST BIOPSY Right     excisional 1985    BUNIONECTOMY  Jan 2013    right foot    CARDIAC CATHETERIZATION      COLONOSCOPY N/A 9/20/2016    Procedure: COLONOSCOPY;  Surgeon: Rachelle Guerra MD;  Location: [facility] ([address] [place] [address]);  Service: Endoscopy;  Laterality: N/A;    COLONOSCOPY N/A 12/21/2016    Procedure: COLONOSCOPY;  Surgeon: Freedom Sandoval MD;  [facility]  Service: Endoscopy;  Laterality: N/A;  PREP: PREPOPIK  SCGEDULKE EAFRLY IN [facility]    FOOT SURGERY      right foot    HYSTERECTOMY  1980s    bleeding    orbital fx      TONSILLECTOMY      VAGINA SURGERY         Review of patient's allergies indicates:  No Known Allergies  Family History     Problem Relation (Age of Onset)    Diabetes Mother    Heart disease Father (57)        Social History Main Topics    Smoking status: Former Smoker     Packs/day: 1.00     Years: 15.00     Quit date: 4/6/2004    Smokeless tobacco: Never Used    Alcohol use 3.0 - 6.0 oz/week     5 - 10 Standard drinks or equivalent per week      Comment: 1 glass scotch a day    Drug use: No    Sexual activity: No     Review of Systems   Constitutional: Positive for appetite change and fever. Negative for activity change and chills.   HENT: Negative for sore throat and trouble swallowing.    Respiratory: Positive for cough. Negative for shortness of breath.    Cardiovascular: Negative for chest pain and leg swelling.    Gastrointestinal: Negative for abdominal pain, blood in stool, constipation, diarrhea, nausea and vomiting.   Genitourinary: Negative for decreased urine volume and difficulty urinating.   Musculoskeletal: Negative for arthralgias and back pain.   Skin: Negative for color change and pallor.   Neurological: Negative for light-headedness.   Psychiatric/Behavioral: Negative for agitation and confusion.     Objective:     Vital Signs (Most Recent):  Temp: 98.1 °F (36.7 °C) (06/25/18 1537)  Pulse: 99 (06/25/18 1537)  Resp: 18 (06/25/18 1537)  BP: 113/64 (06/25/18 1537)  SpO2: (!) 94 % (06/25/18 1537) Vital Signs (24h Range):  Temp:  [98.1 °F (36.7 °C)-98.6 °F (37 °C)] 98.1 °F (36.7 °C)  Pulse:  [96-99] 99  Resp:  [18] 18  SpO2:  [94 %] 94 %  BP: (113-156)/(64-82) 113/64     Weight: 56.7 kg (125 lb) (06/25/18 1537)  Body mass index is 19.58 kg/m².    No intake or output data in the 24 hours ending 06/25/18 1746    Lines/Drains/Airways     Peripheral Intravenous Line                 Peripheral IV - Single Lumen 06/25/18 1635 Left Antecubital less than 1 day                Physical Exam   Constitutional: She is oriented to person, place, and time. She appears well-developed and well-nourished. No distress.   HENT:   Mouth/Throat: Oropharynx is clear and moist.   Able to drink water without spitting it up (witnessed by me)  Tolerating secretions   Eyes: No scleral icterus.   Cardiovascular: Normal rate and regular rhythm.    Pulmonary/Chest: Effort normal and breath sounds normal.   Abdominal: Soft. Bowel sounds are normal. She exhibits no distension and no mass. There is no tenderness. There is no guarding.   Musculoskeletal: She exhibits no edema or deformity.   Lymphadenopathy:     She has no cervical adenopathy.   Neurological: She is alert and oriented to person, place, and time.   Skin: Skin is warm and dry.   Psychiatric: She has a normal mood and affect.   Vitals reviewed.      Significant Labs:  CBC: No results  for input(s): WBC, HGB, HCT, PLT in the last 48 hours.  CMP: No results for input(s): GLU, CALCIUM, ALBUMIN, PROT, NA, K, CO2, CL, BUN, CREATININE, ALKPHOS, ALT, AST, BILITOT in the last 48 hours.  Coagulation: No results for input(s): PT, INR, APTT in the last 48 hours.

## 2018-06-25 NOTE — ED TRIAGE NOTES
Patient states she ate steak, potatoes, and onions for lunch yesterday and her esophagus started to spasm and she couldn't keep water down.  Patient denies pain or trouble breathing.  Patient states she went and saw her ENT and he suggested she come to the ED for IV fluids and to be scoped.

## 2018-06-25 NOTE — ASSESSMENT & PLAN NOTE
73 yo F with sensation of food impaction now able to tolerate secretions and water. Patient likely had steak food impaction of esophagus yesterday which has subsequently passed on its own or with glucagon. No evidence of obstruction or impaction at this time. Patient is aware that if symptoms develop again she will call the on-call GI clinic number.    Recommendations:  - Will need EGD as an outpatient  - No indication for inpatient EGD at this time

## 2018-06-25 NOTE — HPI
This is a 71 yo F who presents with complaints of food bolus sensation. She reports she was eating steak yesterday for lunch and felt the sensation of a bite of steak getting stuck. After that she was unable to tolerate her secretions for a number of hours until she fell asleep. This morning she tried to swallow some water but it came back up. Throughout this period she never felt any chest discomfort. On arrival to ED, she reports she is able to tolerate secretions now and is not spitting up. She was administered glucagon 1mg IV about 20 minutes prior to my evaluation. She reports she does not feel food is stuck anymore. She was able to tolerate a cup of water without spitting it up or throwing it up.

## 2018-06-25 NOTE — ED PROVIDER NOTES
"Encounter Date: 6/25/2018    SCRIBE #1 NOTE: I, Brenna Jeong, am scribing for, and in the presence of,  Dr. Falk. I have scribed the following portions of the note - Other sections scribed: HPI, ROS, PE.       History     Chief Complaint   Patient presents with    Foreign Body In Throat     was eating steak yest and got stuck, not able to swallow     Time patient was seen by the provider: 4:50 PM      The patient is a 72 Y.O. female with a PM Hx of MI and co morbidity of acute CKD and HTN who presents to the ED with a complaint of foreign body in throat. The patient reports eating steak yesterday for lunch and says a piece of it is stuck in her throat. The patient reports being unable to drink water, eat ice chips, and keep secretions down. She states, "nothing will go down". She report mild nausea and two episodes of emesis with clear fluid. The patient reports this has never happened and denies any past esophogeal issues. She denies any pain and SOB. Onset was sudden and gradually worsened with associated dysphagia and difficulty swallowing fluids and secretions. No CP, ABD pain, neck pain, fevers or chills.       The history is provided by the patient and medical records.     Review of patient's allergies indicates:  No Known Allergies  Past Medical History:   Diagnosis Date    Abnormal liver enzymes 4/6/2015    Acute on chronic kidney disease, stage 3 4/18/2013    Alcohol-induced acute pancreatitis 8/16/2015    Anemia     Bunion, right foot 12/14/2012    Compression fracture 11/14/2013    MI (myocardial infarction) 1991    PAF (paroxysmal atrial fibrillation) 9/8/2015    During acute illness     SCC (squamous cell carcinoma) 05/11/2016    in situ left lower lip, nasal bridge     Past Surgical History:   Procedure Laterality Date    blephroplasty      BREAST BIOPSY Right     excisional 1985    BUNIONECTOMY  Jan 2013    right foot    CARDIAC CATHETERIZATION      COLONOSCOPY N/A 9/20/2016    " Procedure: COLONOSCOPY;  Surgeon: Rachelle Guerra MD;  Location: [address]);  Service: Endoscopy;  Laterality: N/A;    COLONOSCOPY N/A 12/21/2016    Procedure: COLONOSCOPY;  Surgeon: Freedom Sandoval MD;  Location: [facility] ([address] [place] [address]);  Service: Endoscopy;  Laterality: N/A;  PREP: PREPOPIK  SCGEDULKE EAFRLY IN [facility]    FOOT SURGERY      right foot    HYSTERECTOMY  1980s    bleeding    orbital fx      TONSILLECTOMY      VAGINA SURGERY       Family History   Problem Relation Age of Onset    Diabetes Mother     Heart disease Father 57        sudden death    Celiac disease Neg Hx     Colon cancer Neg Hx     Crohn's disease Neg Hx     Esophageal cancer Neg Hx     Inflammatory bowel disease Neg Hx     Irritable bowel syndrome Neg Hx     Liver cancer Neg Hx     Rectal cancer Neg Hx     Stomach cancer Neg Hx     Ulcerative colitis Neg Hx      Social History   Substance Use Topics    Smoking status: Former Smoker     Packs/day: 1.00     Years: 15.00     Quit date: 4/6/2004    Smokeless tobacco: Never Used    Alcohol use 3.0 - 6.0 oz/week     5 - 10 Standard drinks or equivalent per week      Comment: 1 glass scotch a day     Review of Systems   Constitutional: Negative for fever.   HENT: Positive for trouble swallowing. Negative for sore throat.         Unable to tolerate secretions at home   Respiratory: Negative for shortness of breath.    Cardiovascular: Negative for chest pain.   Gastrointestinal: Positive for nausea and vomiting.        Two episodes, clear fluids.    Genitourinary: Negative for dysuria.   Musculoskeletal: Negative for back pain.   Skin: Negative for rash.   Neurological: Negative for weakness.   Hematological: Does not bruise/bleed easily.       Physical Exam     Initial Vitals [06/25/18 1537]   BP Pulse Resp Temp SpO2   113/64 99 18 98.1 °F (36.7 °C) (!) 94 %      MAP       --         Physical Exam    Nursing note and vitals reviewed.  Constitutional: She appears  well-developed and well-nourished.   HENT:   Head: Normocephalic and atraumatic.   excessive salivation and poor ability to swallow secretions   Eyes: EOM are normal. Pupils are equal, round, and reactive to light.   Neck: Normal range of motion. Neck supple.   Cardiovascular: Normal rate, regular rhythm and intact distal pulses. Exam reveals no gallop and no friction rub.    No murmur heard.  Pulmonary/Chest: Breath sounds normal. No respiratory distress.   Abdominal: Soft. Bowel sounds are normal. She exhibits no distension. There is no tenderness. There is no rebound and no guarding.   Musculoskeletal: Normal range of motion. She exhibits no edema or tenderness.   Neurological: She is alert and oriented to person, place, and time. She has normal strength.   Skin: Skin is warm and dry. No erythema. No pallor.         ED Course   Procedures  Labs Reviewed - No data to display         Medical Decision Making:   History:   Old Medical Records: I decided to obtain old medical records.  Initial Assessment:   71 y/o female with a hx of CAD, HTN and CKD presents with dysphagia and concern for esophageal food impaction.     DDX: food impaction, esophagitis, zenker's diverticulum, boerhaves, lynn gresham tear, fb sensation       Initially, on arrival patient had difficulty tolerating secretions and water secondary to steak which she had eaten for lunch yesterday. Exam w/o chest or neck pain. No visible findings in oropharynx. No respiratory distress. Glucagon 1mg given immediately on arrival. Discussed case with GI physician who came to evaluate the patient. On re-eval after 30 min, patient was able to tolerate secretions and drink water. Suspect food bolus passage with Glucagon or spontaneously. Patient was observed for 2 hours and was able to continue tolerating secretions and liquids. Evaluated by GO and given instructions to f/u in 24 hours or sooner if symptoms develop again. No further evidence of obstruction or  airway compromise. Patient tolerating liquids and was discharged home.  Patient agreeable to discharge plan. Strict ED precautions and return instructions discussed at length and patient verbalized understanding. All questions were answered and ample time was given for questions.              Scribe Attestation:   Scribe #1: I performed the above scribed service and the documentation accurately describes the services I performed. I attest to the accuracy of the note.    I, Dr. Say Falk, personally performed the services described in this documentation. All medical record entries made by the scribe were at my direction and in my presence.  I have reviewed the chart and agree that the record reflects my personal performance and is accurate and complete.              Clinical Impression:   The encounter diagnosis was Food impaction of esophagus, initial encounter.     Disposition:   Disposition: Discharged  Condition: Stable    Say Falk DO    Dept of Emergency Medicine   [facility]  Spectralink: [id number]                      Say Falk DO  06/28/18 1403

## 2018-06-26 ENCOUNTER — PATIENT MESSAGE (OUTPATIENT)
Dept: OTOLARYNGOLOGY | Facility: CLINIC | Age: 73
End: 2018-06-26

## 2018-06-26 PROBLEM — R13.10 DYSPHAGIA: Status: ACTIVE | Noted: 2018-06-26

## 2018-06-26 NOTE — PROGRESS NOTES
"Chief Complaint   Patient presents with    feels like something is in throat, nauseated       HPI   72 y.o. female presents for evaluation of dysphagia.  She reports that she was eating steak yesterday at lunch and experienced a "spasm" of her throat.  She has not been able to swallow food, water or saliva since.  She feels that these things are blocked from passing her upper thoracic esophagus.  She denies pain or SOB.     Review of Systems   Constitutional: Negative for fatigue and unexpected weight change.   HENT: Per HPI.  Eyes: Negative for visual disturbance.   Respiratory: Negative for shortness of breath, hemoptysis   Cardiovascular: Negative for chest pain and palpitations.   Musculoskeletal: Negative for decreased ROM, back pain.   Skin: Negative for rash, sunburn, itching.   Neurological: Negative for dizziness and seizures.   Hematological: Negative for adenopathy. Does not bruise/bleed easily.   Endocrine: Negative for rapid weight loss/weight gain, heat/cold intolerance.     Past Medical History   Patient Active Problem List   Diagnosis    Hypertension    Hyperlipidemia    Coronary artery disease    Adjustment disorder with depressed mood    Status post non-ST elevation myocardial infarction (NSTEMI)    Colon adenoma    Personal history of skin cancer    Weakness of left lower extremity    Food impaction of esophagus           Past Surgical History   Past Surgical History:   Procedure Laterality Date    blephroplasty      BREAST BIOPSY Right     excisional 1985    BUNIONECTOMY  Jan 2013    right foot    CARDIAC CATHETERIZATION      COLONOSCOPY N/A 9/20/2016    Procedure: COLONOSCOPY;  Surgeon: Rachelle Guerra MD;  Location: [address]);  Service: Endoscopy;  Laterality: N/A;    COLONOSCOPY N/A 12/21/2016    Procedure: COLONOSCOPY;  Surgeon: Freedom Sandoval MD;  Location: [address]);  Service: Endoscopy;  Laterality: N/A;  PREP: PREPOPHENOK [place] IN [facility] "    FOOT SURGERY      right foot    HYSTERECTOMY  1980s    bleeding    orbital fx      TONSILLECTOMY      VAGINA SURGERY           Family History   Family History   Problem Relation Age of Onset    Diabetes Mother     Heart disease Father 57        sudden death           Social History   .  Social History     Social History    Marital status:      Spouse name: N/A    Number of children: N/A    Years of education: N/A     Occupational History    Not on file.     Social History Main Topics    Smoking status: Former Smoker     Packs/day: 1.00     Years: 15.00     Quit date: 4/6/2004    Smokeless tobacco: Never Used    Alcohol use 3.0 - 6.0 oz/week     5 - 10 Standard drinks or equivalent per week      Comment: 1 glass scotch a day    Drug use: No    Sexual activity: No     Other Topics Concern    Not on file     Social History Narrative    No narrative on file         Allergies   Review of patient's allergies indicates:  No Known Allergies        Physical Exam     Vitals:    06/25/18 1321   BP: (!) 156/82   Pulse: 96   Temp: 98.6 °F (37 °C)         Body mass index is 19.3 kg/m².      General: AOx3, NAD   Respiratory:  Symmetric chest rise, normal effort  Nose: No gross nasal septal deviation. Inferior Turbinates WNL bilaterally. No septal perforation. No masses/lesions.   Oral Cavity:  Oral Tongue mobile, no lesions noted. Hard Palate WNL. No buccal or FOM lesions.  Oropharynx:  No masses/lesions of the posterior pharyngeal wall. Tonsillar fossa without lesions. Soft palate without masses. Midline uvula.   Neck: No scars.  No cervical lymphadenopathy, thyromegaly or thyroid nodules.  Normal range of motion.    Face: House Brackmann I bilaterally.     Flex Naso Bertha Hypo Procedures #2    Procedure:  Diagnostic flexible nasopharyngoscopy, laryngoscopy and hypopharyngoscopy:    Routine preparation with local atomizer with 1% neosynephrine/pontocaine with customary flexible  endoscope.    Nasopharynx:  No lesions.   Mucosa:  No lesions.   Adenoids:  Present.  Posterior Choanae:  Patent.  Eustachian Tubes:  Patent.  Posterior pharynx:  No lesions.  Larynx/hypopharynx:   Epiglottis:  No lesions, without edema.   AE Folds:  No lesions.   Vocal cords:  No polyps, nodules, ulcers or lesions.   Mobility:  Equal and normal bilateral.   Hypopharynx:  No lesions.   Piriform sinus:  No pooling, no lesions.   Post Cricoid:  No erythema, no edema.      Assessment/Plan  Problem List Items Addressed This Visit     None

## 2018-06-26 NOTE — ASSESSMENT & PLAN NOTE
Possible food impaction with resultant dysphagia.   Normal head and neck exam. I recommended that she proceed to the ED for evaluation, IVF and possible EGD.  She reports that she will go home to tend to her dog then will present to the ED.

## 2018-06-27 ENCOUNTER — OFFICE VISIT (OUTPATIENT)
Dept: GASTROENTEROLOGY | Facility: CLINIC | Age: 73
End: 2018-06-27
Payer: MEDICARE

## 2018-06-27 VITALS
DIASTOLIC BLOOD PRESSURE: 68 MMHG | BODY MASS INDEX: 19.69 KG/M2 | HEIGHT: 67 IN | SYSTOLIC BLOOD PRESSURE: 125 MMHG | HEART RATE: 58 BPM | WEIGHT: 125.44 LBS

## 2018-06-27 DIAGNOSIS — R13.19 ESOPHAGEAL DYSPHAGIA: Primary | ICD-10-CM

## 2018-06-27 DIAGNOSIS — K59.00 CONSTIPATION, UNSPECIFIED CONSTIPATION TYPE: ICD-10-CM

## 2018-06-27 PROCEDURE — 99999 PR PBB SHADOW E&M-EST. PATIENT-LVL IV: CPT | Mod: PBBFAC,,, | Performed by: NURSE PRACTITIONER

## 2018-06-27 PROCEDURE — 99213 OFFICE O/P EST LOW 20 MIN: CPT | Mod: S$GLB,,, | Performed by: NURSE PRACTITIONER

## 2018-06-27 PROCEDURE — 3078F DIAST BP <80 MM HG: CPT | Mod: CPTII,S$GLB,, | Performed by: NURSE PRACTITIONER

## 2018-06-27 PROCEDURE — 3074F SYST BP LT 130 MM HG: CPT | Mod: CPTII,S$GLB,, | Performed by: NURSE PRACTITIONER

## 2018-06-27 NOTE — PROGRESS NOTES
[facility] Note    Reason for Visit:  The primary encounter diagnosis was Esophageal dysphagia. A diagnosis of Constipation, unspecified constipation type was also pertinent to this visit.    PCP:   Kristina Orozco   No address on file    Referring MD:  Dept Physician Emergency  No address on file    HPI:  This is a 72 y.o. female here for evaluation of dyspahgia.  Piece of steak got stuck in esophagus on 6/24/18. Could not swallow anything including saliva after wards. Seen by ENT the following day w/ normal laryngoscopy. Went to ED after ENT visit and given IV glucagon with improvement. Later that night, had problems swallowing pills. Has been swallowing fine since 6/26/18. Had never had problems swallowing prior to this.     Abdominal pain - no  Reflux - no problems. Takes omeprazole  20 mg daily per pcp  Bowel habits - tends toward constipation.1 bristol type 1 BM/day.  GI bleeding - denies hematochezia, hematemesis, melena, BRBPR, black/tarry stools, and coffee ground emesis  NSAID usage - once every three months or less    ROS:  Constitutional: No fevers, no chills, No unintentional weight loss, no fatigue,   ENT: No allergies  CV: No chest pain, no palpitations, no perif. edema, no sob on exertion  Pulm: No cough, No shortness of breath, no wheezes, no sputum  Ophtho: No vision changes  GI: see HPI; also no nausea, no vomiting, no change in appetite  Derm: No rash  Heme: No lymphadenopathy, No bruising  MSK: No arthritis, no muscle pain, no muscle weakness  : No dysuria, No hematuria  Endo: No hot or cold intolerance  Neuro: No syncope, No seizure,       Medical History:  has a past medical history of Abnormal liver enzymes (4/6/2015); Acute on chronic kidney disease, stage 3 (4/18/2013); Alcohol-induced acute pancreatitis (8/16/2015); Anemia; Bunion, right foot (12/14/2012); Compression fracture (11/14/2013); MI (myocardial infarction) (1991); PAF (paroxysmal atrial  fibrillation) (9/8/2015); and SCC (squamous cell carcinoma) (05/11/2016).    Surgical History:  has a past surgical history that includes Tonsillectomy; Vagina surgery; Hysterectomy (1980s); Foot surgery; Bunionectomy (Jan 2013); Cardiac catheterization; blephroplasty; orbital fx; Colonoscopy (N/A, 9/20/2016); Colonoscopy (N/A, 12/21/2016); and Breast biopsy (Right).    Family History: family history includes Diabetes in her mother; Heart disease (age of onset: 57) in her father..     Social History:  reports that she quit smoking about 14 years ago. She has a 15.00 pack-year smoking history. She has never used smokeless tobacco. She reports that she drinks about 3.0 - 6.0 oz of alcohol per week . She reports that she does not use drugs.    Review of patient's allergies indicates:  No Known Allergies    Current Outpatient Prescriptions   Medication Sig    ALPRAZolam (XANAX) 0.25 MG tablet TAKE 1/2 TO 1 TABLET BY MOUTH BEFORE FLIGHT    amLODIPine (NORVASC) 5 MG tablet Take 1 tablet (5 mg total) by mouth once daily. (Patient taking differently: Take 5 mg by mouth 2 (two) times daily. )    co-enzyme Q-10 30 mg capsule Take 30 mg by mouth once daily.     fexofenadine (ALLEGRA) 180 MG tablet Take by mouth daily as needed. 1 Tablet Oral Every day    Lactobacillus rhamnosus GG (CULTURELLE) 10 billion cell capsule Take 1 capsule by mouth once daily.    metoprolol tartrate (LOPRESSOR) 50 MG tablet Take 1 tablet (50 mg total) by mouth 2 (two) times daily.    mirtazapine (REMERON) 15 MG tablet TAKE 1 TABLET (15 MG TOTAL) BY MOUTH EVERY EVENING.    multivitamin (ONE DAILY MULTIVITAMIN) per tablet Take 1 tablet by mouth once daily.    omeprazole (PRILOSEC) 20 MG capsule Take 1 capsule (20 mg total) by mouth once daily.    ramipril (ALTACE) 10 MG capsule Take 1 capsule (10 mg total) by mouth 2 (two) times daily.    rosuvastatin (CRESTOR) 20 MG tablet Take 1 tablet (20 mg total) by mouth once daily.    VENTOLIN HFA 90  "mcg/actuation inhaler INHALE 1-2 PUFFS INTO THE LUNGS EVERY 4 (FOUR) HOURS AS NEEDED.     No current facility-administered medications for this visit.        Objective Findings:    Vital Signs:  /68   Pulse (!) 58   Ht 5' 7" (1.702 m)   Wt 56.9 kg (125 lb 7.1 oz)   LMP  (LMP Unknown)   BMI 19.65 kg/m²   Body mass index is 19.65 kg/m².    Physical Exam:  General Appearance: Well appearing in no acute distress  Head:   Normocephalic, without obvious abnormality  Eyes:    No scleral icterus, EOMI  ENT: Neck supple, Lips, mucosa, and tongue normal; teeth and gums normal  Lungs: CTA bilaterally in anterior and posterior fields, no wheezes, no crackles.  Heart:  Regular rate and rhythm, S1, S2 normal, no murmurs heard  Abdomen: Soft, non tender, non distended with positive bowel sounds in all four quadrants. No hepatosplenomegaly, ascites, or mass  Extremities: 2+ radial pulses, no clubbing, cyanosis or edema  Skin: No rash to exposed areas  Neurologic: A&Ox4      Labs:  Lab Results   Component Value Date    WBC 4.65 01/26/2018    HGB 13.2 01/26/2018    HCT 41.3 01/26/2018     01/26/2018    CHOL 251 (H) 01/26/2018    TRIG 322 (H) 01/26/2018    HDL 94 (H) 01/26/2018    ALT 49 (H) 01/26/2018    AST 64 (H) 01/26/2018     01/26/2018    K 4.3 01/26/2018     01/26/2018    CREATININE 0.8 01/26/2018    BUN 13 01/26/2018    CO2 28 01/26/2018    TSH 1.460 01/26/2018    INR 0.9 08/16/2015    HGBA1C 5.2 06/19/2017       Imaging:  Chest xray 1/5/18: NL  Ct abd/pelvis 2/16/16: chronic compression fracture deformity w/ cement in place at L1.   Endoscopy:    EGD-none  Colon 12/21/16: GPC. 10 mm ascending colon polyp (-). Sigmoid, descending, ascending tics. 30 mm transverse colon polyp (TVA). Rpt in 3 yrs.  Colon 9/20/16: GPTC. Sigmoid tics. 5 mm rectal polyp (-). Large polypoid lesion at 40 cm proximal to anus tattooed, bx (TVA). 15 mm polyp at 45 cm proximal to anus (adenomatous). 10 mm polyp in cecum " (ademomatous). Two 5-8 mm cecal polyps (adenomatous).   EGD 8/25/11: NL esophagus. Erosive gastropathy. Nl duodenum.   Colon 8/25/11: GPTC. Sigmoid tics. Tortuous colon. Nl . Rpt 5 yrs.  Colon 9/24/08: GPTC. Sigmoid and descending tics. 8 mm transverse colon polyp (adenomatous).   Assessment:  1. Esophageal dysphagia    2. Constipation, unspecified constipation type           Recommendations:  1. Dysphagia- resolved. EGD recommended to ensure no mass, obstruction, stricture is being missed. Pt declines and states if the s/s recur she will contact us for EGD. States understands the risks involved in declining the EGD. Upper GI series offered as an alternate evaluation method. Pt once again declines.   2. Constipation- daily fiber supplementation as per handout provided.     Follow-up if symptoms worsen or fail to improve.      Order summary:         Thank you so much for allowing me to participate in the care of Jacqueline O Favret Tiffany A Tunnell, APRN, FNP-C

## 2018-06-27 NOTE — PATIENT INSTRUCTIONS
HIGH FIBER KEY POINTS:  1. Goal of 20-25 grams of fiber each day for women, 30-35 grams each day for men. Slowly build up to this goal as you may experience gas and bloating at first.  2. Take a fiber supplement to help reach this goal: Metamucil, Citrucel, Fibercon, Konsyl, or Psyllium  3. For Constipation: Finely ground psyllium husk (with or without flavoring or Additives)   - To start: 1 teaspoon once a day in the morning with 8 oz of liquid    followed by an additional 8 ounce glass of water   - After a week: add a second teaspoon in the middle of the day   - After two weeks: add a third teaspoon at bedtime   - Follow each dose with another glass of water. Can add a splash of juice or lemonade for taste.  3. Drink 6-8 glasses of water a day.  4. Try to do plant fiber (fruits and vegetables) over processed fibers (cereals and breads)     HIGH FIBER DIET  Fiber is present in all fruits, vegetables, cereals and grains. Fiber passes through the body undigested. A high fiber diet helps food move through the intestinal tract. The added bulk is helpful in preventing constipation. In persons with diverticulosis it serves to clean out the pouches along the colon wall while preventing new ones from forming. A high fiber diet may reduce the risk of colon cancer, decreases blood cholesterol and prevents high blood sugar in diabetics.    The foods listed below are high in fiber and should be included in your diet. If you are not used to high fiber foods, start with 1 or 2 foods from this list. Every 3-4 days add a new one to your diet until you are eating 4 high fiber foods per day. This should give you 20-35 Gm of fiber/day. It is also important to drink a lot of water when you are on this diet (6-8 glasses a day). Water causes the fiber to swell and increases the benefit.  Add Fiber to Your Diet   Adding fiber to your diet can help relieve constipation by making stools softer and easier to pass. To increase your fiber  intake, your doctor may recommend a bulking agent, such as psyllium. This is a high-fiber supplement available at most grocery and drugstores. Eating more fiber-rich foods will also help. There are two types of fiber:   Insoluble fiber is the main ingredient in bulking agents. Its also found in foods such as wheat bran, whole-grain breads, fresh fruits, and vegetables.   Soluble fiber is found in foods such as oat bran. Although soluble fiber is good for you, it may not ease constipation as much as foods high in insoluble fiber.    FOODS HIGH IN DIETARY FIBER:  BREADS: Made with 100% whole wheat flour; Du, wheat or rye crackers; tortillas, bran muffins. Whole grains, such as wheat bran, corn bran, and brown rice.  CEREALS: Whole grain cereal with bran (Chex, Raisin Bran, Corn Bran), oatmeal, rolled oats, granola, wheat flakes, brown rice  NUTS: Any nuts  FRUITS: All fresh fruits along with edible skins, (bananas, citrus fruit, mangoes, pears, prunes, raisins, apples, pineapple, apricot, melon, jams and marmalades), fruit juices (especially prune juice)  VEGETABLES: All types, preferably raw or lightly cooked: especially, celery, eggplant, potatoes,spinach, broccoli, brussel sprouts, winter squash, carrots, cauliflower, soybeans, lentils, fresh and dried beans of all kinds  OTHER: Popcorn, any spices.   Nuts and legumes, especially peanuts, lentils, and kidney beans.  Easy Ways to Add Fiber   The tips below offer some simple ways to add more high-fiber foods to your meals.   Start your day with a high-fiber breakfast. Eat a wheat bran cereal along with a sliced banana. Or, try peanut butter on whole-wheat toast.   Eat carrot sticks for snacks. Theyre easy to prepare, taste great, and are low in calories.   Use whole-grain breads instead of white bread for sandwiches.   Eat fruits for treats. Try an apple and some raisins instead of a candy bar.  Vegetables  Artichoke, cooked 10.3  Asparagus - 2.8  Beans -  2  Broccoli, boiled 1 cup - 5.1  Simms sprouts, cooked 1 cup - 4.1  Carrots - boiled 2.5, raw 4  Celery - 1  Corn - 4  Corn on the Cob - 5.9  Lettuce - 1  Peas (canned) - 4  Peas (dried) - 7.9  Green peas (cooked) - 8.8  Potato, with skin, baked - 3.0  Spinach - 4  Sweet potato - 3.4  Turnip greens, boiled 1 cup - 5.0  Sweet corn, cooked  1 cup  4.0  Tomato paste -1/4 cup -2.7  Yam - 2.7  Legumes, Nuts, and Seeds  Split peas, cooked  1 cup  16.3  Lentils, cooked 1 cup 15.6  Black beans, cooked 1 cup 15.0  Black eyed peas (1/2 cup) 4.2  Chick peas (1/2 cup) 5  Lima beans, cooked 1 cup  13.2  Baked beans, vegetarian, canned, cooked 1 cup 10.4  Sunflower seed kernels 1/4 cup 3.9  Almonds 1 ounce (23 nuts)  3.5  Pistachio nuts 1 ounce (49 nuts) 2.9  Pecans 1 ounce (19 halves) 2.7  Beans (lima,kidney,baked) - 10 (1/2 cup)  Refried beans -12 (1cup)  Lentils - 8  Soybeans (1/2 cup) 5  Fruit  Raspberries  1 cup  8.0  Pear, with skin - 5.5  Apple, with skin 4.4 (Juice = 0)  Banana - 3.1  Orange - 3.1  Strawberries (halves) 1 cup - 3.0  Figs, dried 2 medium - 1.6  Raisins 1 ounce (60 raisins) -1.0  Grapefruit - 1.4  Peach - 2  Kiwi - 5  Nick - 3.7  Pineapple - 2  Cereal, Grains, and Pasta  Spaghetti, whole-wheat, cooked 1 cup 6.3  Barley, pearled, cooked 1 cup 6.0  Bran flakes 3/4 cup 5.3  Oat bran muffin 1 medium 5.2  Oatmeal, instant, cooked 1 cup 4.0  Popcorn, air-popped 3 cups 3.5  Brown rice, cooked  1 cup  3.5  Bread, rye 1 slice 1.9, pumpernickel - 2.1  Bagel - 1.6  Bread, whole-wheat or multigrain  1 slice 1.9  Fiber One - 14  100% Bran - 13  Raisin Bran - 3.5  All Bran Extra Fiber - 13

## 2018-07-08 ENCOUNTER — PATIENT MESSAGE (OUTPATIENT)
Dept: GASTROENTEROLOGY | Facility: CLINIC | Age: 73
End: 2018-07-08

## 2018-07-09 DIAGNOSIS — R13.19 ESOPHAGEAL DYSPHAGIA: Primary | ICD-10-CM

## 2018-07-10 ENCOUNTER — PROCEDURE VISIT (OUTPATIENT)
Dept: NEUROLOGY | Facility: CLINIC | Age: 73
End: 2018-07-10
Payer: MEDICARE

## 2018-07-10 DIAGNOSIS — G57.01 COMMON PERONEAL NEUROPATHY OF RIGHT LOWER EXTREMITY: ICD-10-CM

## 2018-07-10 PROCEDURE — 95910 NRV CNDJ TEST 7-8 STUDIES: CPT | Mod: S$GLB,,, | Performed by: NEUROMUSCULOSKELETAL MEDICINE & OMM

## 2018-07-10 PROCEDURE — 95886 MUSC TEST DONE W/N TEST COMP: CPT | Mod: S$GLB,,, | Performed by: NEUROMUSCULOSKELETAL MEDICINE & OMM

## 2018-07-15 RX ORDER — METOPROLOL TARTRATE 50 MG/1
50 TABLET ORAL 2 TIMES DAILY
Qty: 180 TABLET | Refills: 2 | Status: SHIPPED | OUTPATIENT
Start: 2018-07-15 | End: 2019-04-11 | Stop reason: SDUPTHER

## 2018-07-15 RX ORDER — ROSUVASTATIN CALCIUM 20 MG/1
20 TABLET, COATED ORAL DAILY
Qty: 90 TABLET | Refills: 1 | Status: SHIPPED | OUTPATIENT
Start: 2018-07-15 | End: 2019-01-10 | Stop reason: SDUPTHER

## 2018-07-16 ENCOUNTER — TELEPHONE (OUTPATIENT)
Dept: PODIATRY | Facility: CLINIC | Age: 73
End: 2018-07-16

## 2018-07-16 ENCOUNTER — TELEPHONE (OUTPATIENT)
Dept: NEUROLOGY | Facility: CLINIC | Age: 73
End: 2018-07-16

## 2018-07-16 NOTE — TELEPHONE ENCOUNTER
Called and spoke with patient,Patient stated she would like ERG results mail to her.            ----- Message from Li Mckeon sent at 7/16/2018  4:01 PM CDT -----  Contact: self   Pt called requesting a call back from the nurse regarding her ERG results. Pt could be reached at [phone]

## 2018-07-16 NOTE — TELEPHONE ENCOUNTER
----- Message from Ama Norris sent at 7/16/2018  1:46 PM CDT -----  Test Results    Type of Test:EMG  Date of Test:7/10/18  Communication Preference:phone# [phone]  Additional Information:

## 2018-07-17 DIAGNOSIS — G60.9 IDIOPATHIC PERIPHERAL NEUROPATHY: Primary | ICD-10-CM

## 2018-07-17 RX ORDER — GABAPENTIN 300 MG/1
300 CAPSULE ORAL NIGHTLY
Qty: 30 CAPSULE | Refills: 2 | Status: SHIPPED | OUTPATIENT
Start: 2018-07-17 | End: 2018-08-23

## 2018-07-17 NOTE — PROGRESS NOTES
Reviewed right lower extremity EMG.  A prescription for Neurontin 300 mg was written.  Discussed the risks, benefits and adverse effects.  Patient understands that this medication may need to be adjusted and that tolerance may develop.  She will email me in 2 weeks.

## 2018-07-19 ENCOUNTER — SURGERY (OUTPATIENT)
Age: 73
End: 2018-07-19

## 2018-07-19 ENCOUNTER — ANESTHESIA (OUTPATIENT)
Dept: ENDOSCOPY | Facility: HOSPITAL | Age: 73
End: 2018-07-19
Payer: MEDICARE

## 2018-07-19 ENCOUNTER — HOSPITAL ENCOUNTER (OUTPATIENT)
Facility: HOSPITAL | Age: 73
Discharge: HOME | End: 2018-07-19
Attending: INTERNAL MEDICINE | Admitting: INTERNAL MEDICINE
Payer: MEDICARE

## 2018-07-19 ENCOUNTER — ANESTHESIA EVENT (OUTPATIENT)
Dept: ENDOSCOPY | Facility: HOSPITAL | Age: 73
End: 2018-07-19
Payer: MEDICARE

## 2018-07-19 VITALS
RESPIRATION RATE: 18 BRPM | BODY MASS INDEX: 19.62 KG/M2 | SYSTOLIC BLOOD PRESSURE: 150 MMHG | TEMPERATURE: 98 F | OXYGEN SATURATION: 95 % | WEIGHT: 125 LBS | HEIGHT: 67 IN | HEART RATE: 70 BPM | DIASTOLIC BLOOD PRESSURE: 67 MMHG

## 2018-07-19 DIAGNOSIS — R13.19 ESOPHAGEAL DYSPHAGIA: Primary | ICD-10-CM

## 2018-07-19 DIAGNOSIS — R13.10 DYSPHAGIA: ICD-10-CM

## 2018-07-19 PROCEDURE — 88342 IMHCHEM/IMCYTCHM 1ST ANTB: CPT | Mod: 26,,, | Performed by: PATHOLOGY

## 2018-07-19 PROCEDURE — 25000003 PHARM REV CODE 250: Performed by: NURSE ANESTHETIST, CERTIFIED REGISTERED

## 2018-07-19 PROCEDURE — 63600175 PHARM REV CODE 636 W HCPCS: Performed by: NURSE ANESTHETIST, CERTIFIED REGISTERED

## 2018-07-19 PROCEDURE — 43239 EGD BIOPSY SINGLE/MULTIPLE: CPT | Mod: ,,, | Performed by: INTERNAL MEDICINE

## 2018-07-19 PROCEDURE — 27201012 HC FORCEPS, HOT/COLD, DISP: Performed by: INTERNAL MEDICINE

## 2018-07-19 PROCEDURE — 37000008 HC ANESTHESIA 1ST 15 MINUTES: Performed by: INTERNAL MEDICINE

## 2018-07-19 PROCEDURE — E9220 PRA ENDO ANESTHESIA: HCPCS | Mod: ,,, | Performed by: NURSE ANESTHETIST, CERTIFIED REGISTERED

## 2018-07-19 PROCEDURE — 37000009 HC ANESTHESIA EA ADD 15 MINS: Performed by: INTERNAL MEDICINE

## 2018-07-19 PROCEDURE — 88305 TISSUE EXAM BY PATHOLOGIST: CPT | Mod: 26,,, | Performed by: PATHOLOGY

## 2018-07-19 PROCEDURE — 43239 EGD BIOPSY SINGLE/MULTIPLE: CPT | Performed by: INTERNAL MEDICINE

## 2018-07-19 PROCEDURE — 88305 TISSUE EXAM BY PATHOLOGIST: CPT | Performed by: PATHOLOGY

## 2018-07-19 RX ORDER — SODIUM CHLORIDE 0.9 % (FLUSH) 0.9 %
3 SYRINGE (ML) INJECTION
Status: DISCONTINUED | OUTPATIENT
Start: 2018-07-19 | End: 2018-07-19 | Stop reason: HOSPADM

## 2018-07-19 RX ORDER — SODIUM CHLORIDE 9 MG/ML
INJECTION, SOLUTION INTRAVENOUS CONTINUOUS PRN
Status: DISCONTINUED | OUTPATIENT
Start: 2018-07-19 | End: 2018-07-19

## 2018-07-19 RX ORDER — PROPOFOL 10 MG/ML
VIAL (ML) INTRAVENOUS CONTINUOUS PRN
Status: DISCONTINUED | OUTPATIENT
Start: 2018-07-19 | End: 2018-07-19

## 2018-07-19 RX ORDER — LIDOCAINE HCL/PF 100 MG/5ML
SYRINGE (ML) INTRAVENOUS
Status: DISCONTINUED | OUTPATIENT
Start: 2018-07-19 | End: 2018-07-19

## 2018-07-19 RX ORDER — PROPOFOL 10 MG/ML
VIAL (ML) INTRAVENOUS
Status: DISCONTINUED | OUTPATIENT
Start: 2018-07-19 | End: 2018-07-19

## 2018-07-19 RX ADMIN — PROPOFOL 60 MG: 10 INJECTION, EMULSION INTRAVENOUS at 03:07

## 2018-07-19 RX ADMIN — PROPOFOL 30 MG: 10 INJECTION, EMULSION INTRAVENOUS at 03:07

## 2018-07-19 RX ADMIN — PROPOFOL 200 MCG/KG/MIN: 10 INJECTION, EMULSION INTRAVENOUS at 03:07

## 2018-07-19 RX ADMIN — PROPOFOL 100 MG: 10 INJECTION, EMULSION INTRAVENOUS at 03:07

## 2018-07-19 RX ADMIN — LIDOCAINE HYDROCHLORIDE 40 MG: 20 INJECTION, SOLUTION INTRAVENOUS at 03:07

## 2018-07-19 RX ADMIN — SODIUM CHLORIDE: 0.9 INJECTION, SOLUTION INTRAVENOUS at 03:07

## 2018-07-19 NOTE — TRANSFER OF CARE
"Anesthesia Transfer of Care Note    Patient: Jacqueline O Favret    Procedure(s) Performed: Procedure(s) (LRB):  EGD (ESOPHAGOGASTRODUODENOSCOPY) (N/A)    Patient location: [facility]    Anesthesia Type: general    Transport from OR: Transported from OR on room air with adequate spontaneous ventilation    Post pain: adequate analgesia    Post assessment: no apparent anesthetic complications and tolerated procedure well    Post vital signs: stable    Level of consciousness: awake    Nausea/Vomiting: no nausea/vomiting    Complications: none    Transfer of care protocol was followed      Last vitals:   Visit Vitals  BP (!) 147/75 (BP Location: Left arm, Patient Position: Lying)   Pulse 78   Temp 36.8 °C (98.2 °F) (Oral)   Resp 16   Ht 5' 7" (1.702 m)   Wt 56.7 kg (125 lb)   LMP  (LMP Unknown)   SpO2 96%   Breastfeeding? No   BMI 19.58 kg/m²     "

## 2018-07-19 NOTE — INTERVAL H&P NOTE
The patient has been examined and the H&P has been reviewed:    There is no interval changes since last encounter.    EGD: Recent food impaction  Sedation: GA  ASA: Per anesthesia  Mallampati: Per anesthesia    Endoscopy risks, benefits and alternative options discussed and understood by patient/family.          Active Hospital Problems    Diagnosis  POA    Dysphagia [R13.10]  Yes      Resolved Hospital Problems    Diagnosis Date Resolved POA   No resolved problems to display.

## 2018-07-19 NOTE — DISCHARGE INSTRUCTIONS

## 2018-07-19 NOTE — ANESTHESIA POSTPROCEDURE EVALUATION
"Anesthesia Post Evaluation    Patient: Jacqueline O Favret    Procedure(s) Performed: Procedure(s) (LRB):  EGD (ESOPHAGOGASTRODUODENOSCOPY) (N/A)    Final Anesthesia Type: general  Patient location during evaluation: GI PACU  Patient participation: Yes- Able to Participate  Level of consciousness: awake and alert and oriented  Post-procedure vital signs: reviewed and stable  Pain management: adequate  Airway patency: patent  PONV status at discharge: No PONV  Anesthetic complications: no      Cardiovascular status: blood pressure returned to baseline  Respiratory status: unassisted, spontaneous ventilation and room air  Hydration status: euvolemic  Follow-up not needed.        Visit Vitals  BP (!) 150/67   Pulse 70   Temp 36.7 °C (98.1 °F) (Temporal)   Resp 18   Ht 5' 7" (1.702 m)   Wt 56.7 kg (125 lb)   LMP  (LMP Unknown)   SpO2 95%   Breastfeeding? No   BMI 19.58 kg/m²       Pain/Susan Score: Pain Assessment Performed: Yes (7/19/2018  3:56 PM)  Presence of Pain: denies (7/19/2018  3:56 PM)  Susan Score: 10 (7/19/2018  3:56 PM)      "

## 2018-07-19 NOTE — [employer] PATIENT INSTRUCTIONS
Discharge Summary/Instructions after an Endoscopic Procedure  Patient Name: Jacqueline Favret  Patient MRN: [id number]  Patient YOB: 1945 Thursday, July 19, 2018  Jefferson Mina MD  RESTRICTIONS:  During your procedure today, you received medications for sedation.  These   medications may affect your judgment, balance and coordination.  Therefore,   for 24 hours, you have the following restrictions:   - DO NOT drive a car, operate machinery, make legal/financial decisions,   sign important papers or drink alcohol.    ACTIVITY:  Today: no heavy lifting, straining or running due to procedural   sedation/anesthesia.  The following day: return to full activity including work.  DIET:  Eat and drink normally unless instructed otherwise.     TREATMENT FOR COMMON SIDE EFFECTS:  - Mild abdominal pain, nausea, belching, bloating or excessive gas:  rest,   eat lightly and use a heating pad.  - Sore Throat: treat with throat lozenges and/or gargle with warm salt   water.  - Because air was used during the procedure, expelling large amounts of air   from your rectum or belching is normal.  - If a bowel prep was taken, you may not have a bowel movement for 1-3 days.    This is normal.  SYMPTOMS TO WATCH FOR AND REPORT TO YOUR PHYSICIAN:  1. Abdominal pain or bloating, other than gas cramps.  2. Chest pain.  3. Back pain.  4. Signs of infection such as: chills or fever occurring within 24 hours   after the procedure.  5. Rectal bleeding, which would show as bright red, maroon, or black stools.   (A tablespoon of blood from the rectum is not serious, especially if   hemorrhoids are present.)  6. Vomiting.  7. Weakness or dizziness.  GO DIRECTLY TO THE NEAREST EMERGENCY ROOM IF YOU HAVE ANY OF THE FOLLOWING:      Difficulty breathing              Chills and/or fever over 101 F   Persistent vomiting and/or vomiting blood   Severe abdominal pain   Severe chest pain   Black, tarry stools   Bleeding- more than one  tablespoon   Any other symptom or condition that you feel may need urgent attention  Your doctor recommends these additional instructions:  If any biopsies were taken, your doctors clinic will contact you in 1 to 2   weeks with any results.  - Patient has a contact number available for emergencies.  The signs and   symptoms of potential delayed complications were discussed with the   patient.  Return to normal activities tomorrow.  Written discharge   instructions were provided to the patient.   - Discharge patient to home.   - Resume previous diet.   - Continue present medications. Start Omeprazole 20 mg (Prilosec OTC)   daily.  - Repeat upper endoscopy PRN for retreatment.   For questions, problems or results please call your physician - Jefferson Mina MD at Work:  [phone].  [facility], EMERGENCY ROOM PHONE NUMBER: [phone]  IF A COMPLICATION OR EMERGENCY SITUATION ARISES AND YOU ARE UNABLE TO REACH   YOUR PHYSICIAN - GO DIRECTLY TO THE EMERGENCY ROOM.  Jefferson Mina MD  7/19/2018 3:42:42 PM  This report has been verified and signed electronically.  [employer]

## 2018-07-19 NOTE — ANESTHESIA PREPROCEDURE EVALUATION
07/19/2018  Jacqueline O Favret is a 72 y.o., female.    Anesthesia Evaluation         Review of Systems  Anesthesia Hx:  No problems with previous Anesthesia   Social:  Non-Smoker, No Alcohol Use    Cardiovascular:   Exercise tolerance: good Hypertension Past MI CAD  Dysrhythmias  CONCLUSIONS: NO CLINICALLY SIGNIFICANT STRESS INDUCED PERFUSION DEFECTS as assessed with PET perfusion imaging.  1. There is no evidence of a discrete hemodynamically significant coronary stenosis.  The equivocal defect seen on SPECT was artifactual.  2. Although no clinically significant stress defect is seen, there is reduced coronary flow reserve. These results suggest mild endothelial dysfunction due to mild, diffuse, non-obstructive coronary atherosclerosis without clinically significant,   localized perfusion defects.   3. Resting wall motion is physiologic. Stress wall motion is physiologic.   4. Visually estimated LV function is normal at rest and normal at stress.   5. The ventricular volumes are normal at rest and stress.   6. The extracardiac distribution of radioactivity is normal.   7. There was no previous study available to compare.   Pulmonary:  Pulmonary Normal    Renal/:   Chronic Renal Disease, CRI    Hepatic/GI:   Bowel Prep.    Neurological:  Neurology Normal    Psych:   Psychiatric History          Physical Exam  General:  Well nourished    Airway/Jaw/Neck:  Airway Findings: Mouth Opening: Normal Tongue: Normal  General Airway Assessment: Adult  Mallampati: II  Improves to I with phonation.  TM Distance: Normal, at least 6 cm       Chest/Lungs:  Chest/Lungs Clear    Heart/Vascular:  Heart Findings: Normal            Anesthesia Plan  Type of Anesthesia, risks & benefits discussed:  Anesthesia Type:  general  Patient's Preference:   Intra-op Monitoring Plan: standard ASA monitors  Intra-op Monitoring Plan  Comments:   Post Op Pain Control Plan: IV/PO Opioids PRN  Post Op Pain Control Plan Comments:   Induction:   IV  Beta Blocker:  Patient is not currently on a Beta-Blocker (No further documentation required).       Informed Consent: Patient understands risks and agrees with Anesthesia plan.  Questions answered. Anesthesia consent signed with patient.  ASA Score: 3     Day of Surgery Review of History & Physical:    H&P update referred to the provider.         Ready For Surgery From Anesthesia Perspective.

## 2018-07-26 ENCOUNTER — TELEPHONE (OUTPATIENT)
Dept: ORTHOPEDICS | Facility: CLINIC | Age: 73
End: 2018-07-26

## 2018-07-26 ENCOUNTER — TELEPHONE (OUTPATIENT)
Dept: GASTROENTEROLOGY | Facility: CLINIC | Age: 73
End: 2018-07-26

## 2018-07-26 ENCOUNTER — TELEPHONE (OUTPATIENT)
Dept: ENDOSCOPY | Facility: HOSPITAL | Age: 73
End: 2018-07-26

## 2018-07-26 NOTE — TELEPHONE ENCOUNTER
Called and Informed patient of appointment on 07/27/18 at 8:15 am. Patient states verbal understanding and has no further questions.

## 2018-07-26 NOTE — TELEPHONE ENCOUNTER
----- Message from Jefferson Mina MD sent at 7/25/2018  9:26 AM CDT -----  Please notify patient, the stomach biopsies did not reveal any H.pylori.

## 2018-07-26 NOTE — TELEPHONE ENCOUNTER
----- Message from Seferino Velasquez sent at 7/26/2018  2:04 PM CDT -----  Contact: patient  Please call pt at [phone]. Patient had a fall on 07/20/18 and now having bruising and swelling of the right leg, ankle and foot    Thank you

## 2018-07-27 ENCOUNTER — PATIENT MESSAGE (OUTPATIENT)
Dept: NEUROLOGY | Facility: CLINIC | Age: 73
End: 2018-07-27

## 2018-07-27 ENCOUNTER — HOSPITAL ENCOUNTER (OUTPATIENT)
Dept: RADIOLOGY | Facility: HOSPITAL | Age: 73
Discharge: HOME | End: 2018-07-27
Attending: PHYSICIAN ASSISTANT
Payer: MEDICARE

## 2018-07-27 ENCOUNTER — OFFICE VISIT (OUTPATIENT)
Dept: ORTHOPEDICS | Facility: CLINIC | Age: 73
End: 2018-07-27
Payer: MEDICARE

## 2018-07-27 ENCOUNTER — PATIENT MESSAGE (OUTPATIENT)
Dept: ORTHOPEDICS | Facility: CLINIC | Age: 73
End: 2018-07-27

## 2018-07-27 DIAGNOSIS — S93.401A SPRAIN OF RIGHT ANKLE, UNSPECIFIED LIGAMENT, INITIAL ENCOUNTER: ICD-10-CM

## 2018-07-27 DIAGNOSIS — M25.571 ACUTE RIGHT ANKLE PAIN: ICD-10-CM

## 2018-07-27 DIAGNOSIS — R60.9 SWELLING: Primary | ICD-10-CM

## 2018-07-27 DIAGNOSIS — M79.671 RIGHT FOOT PAIN: ICD-10-CM

## 2018-07-27 PROCEDURE — 73630 X-RAY EXAM OF FOOT: CPT | Mod: 26,RT,, | Performed by: RADIOLOGY

## 2018-07-27 PROCEDURE — 99213 OFFICE O/P EST LOW 20 MIN: CPT | Mod: S$GLB,,, | Performed by: PHYSICIAN ASSISTANT

## 2018-07-27 PROCEDURE — 99999 PR PBB SHADOW E&M-EST. PATIENT-LVL III: CPT | Mod: PBBFAC,,, | Performed by: PHYSICIAN ASSISTANT

## 2018-07-27 PROCEDURE — 73610 X-RAY EXAM OF ANKLE: CPT | Mod: 26,RT,, | Performed by: RADIOLOGY

## 2018-07-27 PROCEDURE — 73610 X-RAY EXAM OF ANKLE: CPT | Mod: TC,RT

## 2018-07-27 PROCEDURE — 73630 X-RAY EXAM OF FOOT: CPT | Mod: TC,RT

## 2018-07-27 RX ORDER — ALPRAZOLAM 0.25 MG/1
TABLET ORAL
Qty: 20 TABLET | Refills: 0 | Status: SHIPPED | OUTPATIENT
Start: 2018-07-27 | End: 2018-10-30 | Stop reason: SDUPTHER

## 2018-07-27 NOTE — PROGRESS NOTES
Subjective:      Patient ID: Jacqueline O Favret is a 72 y.o. female.    Chief Complaint: No chief complaint on file.    HPI    Patient is a 72 year old female who presents to clinic with intermittent right ankle swelling since twisting her ankle 1 week ago. Patient stated that she has no pain. Stated that symptoms are increased with increased activity or in dependent position. Patient rerpots ice and elevation will decrease. Denied instability numbness or tingling.     Review of Systems   Constitution: Negative for chills and fever.   Cardiovascular: Negative for chest pain.   Respiratory: Negative for cough and shortness of breath.    Skin: Negative for color change, dry skin, itching, nail changes, poor wound healing and rash.   Musculoskeletal:        Right ankle swelling.    Neurological: Negative for dizziness.   Psychiatric/Behavioral: Negative for altered mental status. The patient is not nervous/anxious.    All other systems reviewed and are negative.        Objective:            General    Constitutional: She is oriented to person, place, and time. She appears well-developed and well-nourished. No distress.   HENT:   Head: Atraumatic.   Eyes: Conjunctivae are normal.   Cardiovascular: Normal rate.    Pulmonary/Chest: Effort normal.   Neurological: She is alert and oriented to person, place, and time.   Psychiatric: She has a normal mood and affect. Her behavior is normal.         Right Ankle/Foot Exam   Right ankle exam is normal.    Range of Motion   The patient has normal right ankle ROM.    Muscle Strength   The patient has normal right ankle strength.    Other   Sensation: normal      bruising to achillis area     RADS: no fracture or dislocation      Assessment:       Encounter Diagnoses   Name Primary?    Right foot pain Yes    Acute right ankle pain           Plan:       Discussed plan with patient. Rest ice and elevate as needed. return to clinic as needed

## 2018-08-07 RX ORDER — MIRTAZAPINE 15 MG/1
15 TABLET, FILM COATED ORAL NIGHTLY
Qty: 90 TABLET | Refills: 1 | Status: SHIPPED | OUTPATIENT
Start: 2018-08-07 | End: 2018-08-27

## 2018-08-09 ENCOUNTER — OFFICE VISIT (OUTPATIENT)
Dept: DERMATOLOGY | Facility: CLINIC | Age: 73
End: 2018-08-09
Payer: MEDICARE

## 2018-08-09 DIAGNOSIS — L90.5 SCAR: ICD-10-CM

## 2018-08-09 DIAGNOSIS — D22.9 NEVUS: ICD-10-CM

## 2018-08-09 DIAGNOSIS — Z85.828 PERSONAL HISTORY OF SKIN CANCER: ICD-10-CM

## 2018-08-09 DIAGNOSIS — L57.0 AK (ACTINIC KERATOSIS): Primary | ICD-10-CM

## 2018-08-09 DIAGNOSIS — L81.4 LENTIGO: ICD-10-CM

## 2018-08-09 DIAGNOSIS — D18.00 ANGIOMA: ICD-10-CM

## 2018-08-09 PROCEDURE — 17003 DESTRUCT PREMALG LES 2-14: CPT | Mod: S$GLB,,, | Performed by: DERMATOLOGY

## 2018-08-09 PROCEDURE — 17000 DESTRUCT PREMALG LESION: CPT | Mod: S$GLB,,, | Performed by: DERMATOLOGY

## 2018-08-09 PROCEDURE — 99999 PR PBB SHADOW E&M-EST. PATIENT-LVL II: CPT | Mod: PBBFAC,,, | Performed by: DERMATOLOGY

## 2018-08-09 PROCEDURE — 99213 OFFICE O/P EST LOW 20 MIN: CPT | Mod: 25,S$GLB,, | Performed by: DERMATOLOGY

## 2018-08-09 RX ORDER — FLUOROURACIL 50 MG/G
CREAM TOPICAL
Qty: 40 G | Refills: 1 | Status: SHIPPED | OUTPATIENT
Start: 2018-08-09 | End: 2018-08-27

## 2018-08-09 NOTE — PROGRESS NOTES
Subjective:       Patient ID:  Jacqueline O Favret is a 72 y.o. female who presents for   Chief Complaint   Patient presents with    Skin Check    Lesion     High risk pt with hx NMSC here to check for the development of new lesions on upper body.  C/o lesion nose x 1 year. Denies bleeding or pain. No tx.   Has bump on left arm for 6 months.  Not draining. No tx.       Lesion         Review of Systems   Constitutional: Negative for fever, chills, fatigue and malaise.   Skin: Positive for daily sunscreen use.   Hematologic/Lymphatic: Does not bruise/bleed easily.        Objective:    Physical Exam   Constitutional: She appears well-developed and well-nourished. No distress.   Neurological: She is alert and oriented to person, place, and time. She is not disoriented.   Psychiatric: She has a normal mood and affect.   Skin:   Areas Examined (abnormalities noted in diagram):   Scalp / Hair Palpated and Inspected  Head / Face Inspection Performed  Neck Inspection Performed  Chest / Axilla Inspection Performed  Abdomen Inspection Performed  Back Inspection Performed  RUE Inspected  LUE Inspection Performed  Nails and Digits Inspection Performed                   Diagram Legend     Erythematous scaling macule/papule c/w actinic keratosis       Vascular papule c/w angioma      Pigmented verrucoid papule/plaque c/w seborrheic keratosis      Yellow umbilicated papule c/w sebaceous hyperplasia      Irregularly shaped tan macule c/w lentigo     1-2 mm smooth white papules consistent with Milia      Movable subcutaneous cyst with punctum c/w epidermal inclusion cyst      Subcutaneous movable cyst c/w pilar cyst      Firm pink to brown papule c/w dermatofibroma      Pedunculated fleshy papule(s) c/w skin tag(s)      Evenly pigmented macule c/w junctional nevus     Mildly variegated pigmented, slightly irregular-bordered macule c/w mildly atypical nevus      Flesh colored to evenly pigmented papule c/w intradermal nevus        Pink pearly papule/plaque c/w basal cell carcinoma      Erythematous hyperkeratotic cursted plaque c/w SCC      Surgical scar with no sign of skin cancer recurrence      Open and closed comedones      Inflammatory papules and pustules      Verrucoid papule consistent consistent with wart     Erythematous eczematous patches and plaques     Dystrophic onycholytic nail with subungual debris c/w onychomycosis     Umbilicated papule    Erythematous-base heme-crusted tan verrucoid plaque consistent with inflamed seborrheic keratosis     Erythematous Silvery Scaling Plaque c/w Psoriasis     See annotation      Assessment / Plan:        AK (actinic keratosis)  Cryosurgery Procedure Note    Verbal consent from the patient is obtained including, but not limited to, risk of hypopigmentation/hyperpigmentation, scar, recurrence of lesion. The patient is aware of the precancerous quality and need for treatment of these lesions. Liquid nitrogen cryosurgery is applied to the 3 actinic keratoses, as detailed in the physical exam, to produce a freeze injury. The patient is aware that blisters may form and is instructed on wound care with gentle cleansing and use of vaseline ointment to keep moist until healed. The patient is supplied a handout on cryosurgery and is instructed to call if lesions do not completely resolve.    -     fluorouracil (EFUDEX) 5 % cream; Apply thin film to nose 2times per day for 2-3 weeks; d/c if area bleeding or ulcerated; avoid eyes or mouth  Dispense: 40 g; Refill: 1    Lentigo  This is a benign hyperpigmented sun induced lesion. Daily sun protection will reduce the number of new lesions. Treatment of these benign lesions are considered cosmetic.  The nature of sun-induced photo-aging and skin cancers is discussed.  Sun avoidance, protective clothing, and the use of 30-SPF sunscreens is advised. Observe closely for skin damage/changes, and call if such occurs.    Angioma  These are benign vascular lesions  that are inherited.  Treatment is not necessary.    Nevus  Discussed ABCDE's of nevi.  Monitor for new mole or moles that are becoming bigger, darker, irritated, or developing irregular borders. Brochure provided.    Personal history of skin cancer  Scar  Area(s) of previous NMSC evaluated with no signs of recurrence.    Upper body skin examination performed today including at least 6 points as noted in physical examination. No lesions suspicious for malignancy noted.             Follow-up in about 6 months (around 2/9/2019).

## 2018-08-10 RX ORDER — RAMIPRIL 10 MG/1
10 CAPSULE ORAL 2 TIMES DAILY
Qty: 180 CAPSULE | Refills: 0 | Status: SHIPPED | OUTPATIENT
Start: 2018-08-10 | End: 2018-11-05 | Stop reason: SDUPTHER

## 2018-08-23 ENCOUNTER — OFFICE VISIT (OUTPATIENT)
Dept: INTERNAL MEDICINE | Facility: CLINIC | Age: 73
End: 2018-08-23
Payer: MEDICARE

## 2018-08-23 VITALS
TEMPERATURE: 98 F | BODY MASS INDEX: 20 KG/M2 | OXYGEN SATURATION: 92 % | SYSTOLIC BLOOD PRESSURE: 118 MMHG | WEIGHT: 127.44 LBS | HEART RATE: 63 BPM | DIASTOLIC BLOOD PRESSURE: 56 MMHG | HEIGHT: 67 IN

## 2018-08-23 DIAGNOSIS — R73.9 HYPERGLYCEMIA: ICD-10-CM

## 2018-08-23 DIAGNOSIS — I10 ESSENTIAL HYPERTENSION: Primary | ICD-10-CM

## 2018-08-23 DIAGNOSIS — I25.10 CORONARY ARTERY DISEASE INVOLVING NATIVE CORONARY ARTERY OF NATIVE HEART WITHOUT ANGINA PECTORIS: ICD-10-CM

## 2018-08-23 DIAGNOSIS — R05.9 COUGH: ICD-10-CM

## 2018-08-23 DIAGNOSIS — E55.9 VITAMIN D DEFICIENCY: ICD-10-CM

## 2018-08-23 DIAGNOSIS — E78.5 HYPERLIPIDEMIA, UNSPECIFIED HYPERLIPIDEMIA TYPE: ICD-10-CM

## 2018-08-23 DIAGNOSIS — R63.4 WEIGHT LOSS: ICD-10-CM

## 2018-08-23 DIAGNOSIS — Z85.828 PERSONAL HISTORY OF SKIN CANCER: ICD-10-CM

## 2018-08-23 DIAGNOSIS — D51.3 OTHER DIETARY VITAMIN B12 DEFICIENCY ANEMIA: ICD-10-CM

## 2018-08-23 PROCEDURE — 99999 PR PBB SHADOW E&M-EST. PATIENT-LVL IV: CPT | Mod: PBBFAC,,, | Performed by: INTERNAL MEDICINE

## 2018-08-23 PROCEDURE — 99214 OFFICE O/P EST MOD 30 MIN: CPT | Mod: S$GLB,,, | Performed by: INTERNAL MEDICINE

## 2018-08-23 PROCEDURE — 3074F SYST BP LT 130 MM HG: CPT | Mod: CPTII,S$GLB,, | Performed by: INTERNAL MEDICINE

## 2018-08-23 PROCEDURE — 3078F DIAST BP <80 MM HG: CPT | Mod: CPTII,S$GLB,, | Performed by: INTERNAL MEDICINE

## 2018-08-23 RX ORDER — AMLODIPINE BESYLATE 2.5 MG/1
2.5 TABLET ORAL DAILY
Qty: 90 TABLET | Refills: 3 | Status: SHIPPED | OUTPATIENT
Start: 2018-08-23 | End: 2019-07-21 | Stop reason: SDUPTHER

## 2018-08-26 NOTE — PROGRESS NOTES
Subjective:       Patient ID: Jacqueline O Favret is a 73 y.o. female.    Chief Complaint: Follow-up    HPIPt is feeling well but is drinking alcohol again.  She has lost weight.  Denies CP or SOB.  Review of Systems   Respiratory: Negative for shortness of breath (PND or orthopnea).    Cardiovascular: Negative for chest pain (arm pain or jaw pain).   Gastrointestinal: Negative for abdominal pain, diarrhea, nausea and vomiting.   Genitourinary: Negative for dysuria.   Neurological: Negative for seizures, syncope and headaches.       Objective:      Physical Exam   Constitutional: She is oriented to person, place, and time. She appears well-developed and well-nourished. No distress.   HENT:   Head: Normocephalic.   Mouth/Throat: Oropharynx is clear and moist.   Neck: Neck supple. No JVD present. No thyromegaly present.   Cardiovascular: Normal rate, regular rhythm, normal heart sounds and intact distal pulses. Exam reveals no gallop and no friction rub.   No murmur heard.  Pulmonary/Chest: Effort normal and breath sounds normal. She has no wheezes. She has no rales.   Abdominal: Soft. Bowel sounds are normal. She exhibits no distension and no mass. There is no tenderness. There is no rebound and no guarding.   Musculoskeletal: She exhibits edema (trace nellie).   Lymphadenopathy:     She has no cervical adenopathy.   Neurological: She is alert and oriented to person, place, and time. She has normal reflexes.   Skin: Skin is warm and dry.   Psychiatric: She has a normal mood and affect. Her behavior is normal. Judgment and thought content normal.       Assessment:       1. Essential hypertension    2. Cough    3. Hyperlipidemia, unspecified hyperlipidemia type    4. Coronary artery disease involving native coronary artery of native heart without angina pectoris    5. Personal history of skin cancer    6. Hyperglycemia    7. Vitamin D deficiency    8. Weight loss    9. Other dietary vitamin B12 deficiency anemia          Plan:   Essential hypertension  -     CBC auto differential; Future; Expected date: 08/23/2018  -     Comprehensive metabolic panel; Future; Expected date: 08/23/2018  -     TSH; Future; Expected date: 08/23/2018  Controlled - continue current meds  Decrease amlodipine to half may help swelling  Cough  -     CT Chest Without Contrast; Future; Expected date: 08/23/2018    Hyperlipidemia, unspecified hyperlipidemia type  -     Lipid panel; Future; Expected date: 08/23/2018    Coronary artery disease involving native coronary artery of native heart without angina pectoris  asymptomatic  Personal history of skin cancer  Recent Derm appt  Hyperglycemia  -     Hemoglobin A1c; Future; Expected date: 08/23/2018    Vitamin D deficiency  -     Vitamin D; Future; Expected date: 08/23/2018    Weight loss  -     Sedimentation rate; Future; Expected date: 08/23/2018  -     C-reactive protein; Future; Expected date: 08/23/2018  -     Vitamin B12; Future; Expected date: 08/23/2018  -     Methylmalonic acid, serum; Future; Expected date: 08/23/2018    Other dietary vitamin B12 deficiency anemia   -     Vitamin B12; Future; Expected date: 08/23/2018    Other orders  -     amLODIPine (NORVASC) 2.5 MG tablet; Take 1 tablet (2.5 mg total) by mouth once daily.  Dispense: 90 tablet; Refill: 3

## 2018-08-27 ENCOUNTER — HOSPITAL ENCOUNTER (EMERGENCY)
Facility: HOSPITAL | Age: 73
Discharge: HOME | End: 2018-08-27
Attending: EMERGENCY MEDICINE
Payer: MEDICARE

## 2018-08-27 ENCOUNTER — TELEPHONE (OUTPATIENT)
Dept: INTERNAL MEDICINE | Facility: CLINIC | Age: 73
End: 2018-08-27

## 2018-08-27 VITALS
OXYGEN SATURATION: 95 % | SYSTOLIC BLOOD PRESSURE: 155 MMHG | BODY MASS INDEX: 19.62 KG/M2 | WEIGHT: 125 LBS | RESPIRATION RATE: 16 BRPM | HEIGHT: 67 IN | HEART RATE: 78 BPM | TEMPERATURE: 99 F | DIASTOLIC BLOOD PRESSURE: 69 MMHG

## 2018-08-27 DIAGNOSIS — E87.6 HYPOKALEMIA: Primary | ICD-10-CM

## 2018-08-27 LAB
BUN SERPL-MCNC: 10 MG/DL (ref 6–30)
BUN SERPL-MCNC: 9 MG/DL (ref 6–30)
CHLORIDE SERPL-SCNC: 83 MMOL/L (ref 95–110)
CHLORIDE SERPL-SCNC: 93 MMOL/L (ref 95–110)
CREAT SERPL-MCNC: 0.8 MG/DL (ref 0.5–1.4)
CREAT SERPL-MCNC: 1.1 MG/DL (ref 0.5–1.4)
GLUCOSE SERPL-MCNC: 133 MG/DL (ref 70–110)
GLUCOSE SERPL-MCNC: 87 MG/DL (ref 70–110)
HCT VFR BLD CALC: 38 %PCV (ref 36–54)
HCT VFR BLD CALC: 41 %PCV (ref 36–54)
MAGNESIUM SERPL-MCNC: 1.7 MG/DL
POC IONIZED CALCIUM: 1.02 MMOL/L (ref 1.06–1.42)
POC IONIZED CALCIUM: 1.04 MMOL/L (ref 1.06–1.42)
POC TCO2 (MEASURED): 33 MMOL/L (ref 23–29)
POC TCO2 (MEASURED): 35 MMOL/L (ref 23–29)
POTASSIUM BLD-SCNC: 2.4 MMOL/L (ref 3.5–5.1)
POTASSIUM BLD-SCNC: 3.5 MMOL/L (ref 3.5–5.1)
SAMPLE: ABNORMAL
SAMPLE: ABNORMAL
SODIUM BLD-SCNC: 134 MMOL/L (ref 136–145)
SODIUM BLD-SCNC: 136 MMOL/L (ref 136–145)

## 2018-08-27 PROCEDURE — 99284 EMERGENCY DEPT VISIT MOD MDM: CPT | Mod: 25

## 2018-08-27 PROCEDURE — 83735 ASSAY OF MAGNESIUM: CPT

## 2018-08-27 PROCEDURE — 93010 ELECTROCARDIOGRAM REPORT: CPT | Mod: 76,,, | Performed by: INTERNAL MEDICINE

## 2018-08-27 PROCEDURE — 25000003 PHARM REV CODE 250: Performed by: PHYSICIAN ASSISTANT

## 2018-08-27 PROCEDURE — 99284 EMERGENCY DEPT VISIT MOD MDM: CPT | Mod: ,,, | Performed by: PHYSICIAN ASSISTANT

## 2018-08-27 PROCEDURE — 96361 HYDRATE IV INFUSION ADD-ON: CPT

## 2018-08-27 PROCEDURE — 96360 HYDRATION IV INFUSION INIT: CPT

## 2018-08-27 PROCEDURE — 93005 ELECTROCARDIOGRAM TRACING: CPT

## 2018-08-27 RX ORDER — POTASSIUM CHLORIDE 20 MEQ/15ML
40 SOLUTION ORAL
Status: COMPLETED | OUTPATIENT
Start: 2018-08-27 | End: 2018-08-27

## 2018-08-27 RX ORDER — POTASSIUM CHLORIDE 20 MEQ/15ML
40 SOLUTION ORAL ONCE
Status: COMPLETED | OUTPATIENT
Start: 2018-08-27 | End: 2018-08-27

## 2018-08-27 RX ADMIN — SODIUM CHLORIDE 1000 ML: 0.9 INJECTION, SOLUTION INTRAVENOUS at 05:08

## 2018-08-27 RX ADMIN — POTASSIUM CHLORIDE 40 MEQ: 20 SOLUTION ORAL at 07:08

## 2018-08-27 RX ADMIN — POTASSIUM CHLORIDE 40 MEQ: 20 SOLUTION ORAL at 06:08

## 2018-08-27 NOTE — ED PROVIDER NOTES
"Encounter Date: 8/27/2018    SCRIBE #1 NOTE: I, Panchito Vargas, am scribing for, and in the presence of,  Dr. Becerra. I have scribed the following portions of the note - the EKG reading and the APC attestation.       History     Chief Complaint   Patient presents with    abnormal lab     Pt presented to the ED via pov. Pt c/o abnormal lab, K+ 2.2, pt denies chest pain and palpations.      73-year-old female with hypertension, hyperlipidemia, paroxysmal AFib and history of NSTEMI presents for abnormal lab.  Patient had routine blood work drawn this morning, noted to have potassium of 2.2 and instructed to come to ED.  Patient reports mental fogginess", no other symptoms at this time.  Denies muscle weakness, muscle cramping, palpitations or chest pain. Patient had diarrhea this weekend, 3-4 loose stools a day for about 3 days.  Denies any loose stool today, abdominal pain, nausea/vomiting or fever.          Review of patient's allergies indicates:  No Known Allergies  Past Medical History:   Diagnosis Date    Abnormal liver enzymes 4/6/2015    Acute on chronic kidney disease, stage 3 4/18/2013    Alcohol-induced acute pancreatitis 8/16/2015    Anemia     Basal cell carcinoma 1985    nose    Bunion, right foot 12/14/2012    Compression fracture 11/14/2013    MI (myocardial infarction) 1991    PAF (paroxysmal atrial fibrillation) 9/8/2015    During acute illness     SCC (squamous cell carcinoma) 05/11/2016    in situ left lower lip, nasal bridge     Past Surgical History:   Procedure Laterality Date    blephroplasty      BREAST BIOPSY Right     excisional 1985    BUNIONECTOMY  Jan 2013    right foot    CARDIAC CATHETERIZATION      FOOT SURGERY      right foot    HYSTERECTOMY  1980s    bleeding    orbital fx      TONSILLECTOMY      VAGINA SURGERY       Family History   Problem Relation Age of Onset    Diabetes Mother     Heart disease Father 57        sudden death    Celiac disease Neg Hx     " Colon cancer Neg Hx     Crohn's disease Neg Hx     Esophageal cancer Neg Hx     Inflammatory bowel disease Neg Hx     Irritable bowel syndrome Neg Hx     Liver cancer Neg Hx     Rectal cancer Neg Hx     Stomach cancer Neg Hx     Ulcerative colitis Neg Hx     Melanoma Neg Hx      Social History     Tobacco Use    Smoking status: Former Smoker     Packs/day: 1.00     Years: 15.00     Pack years: 15.00     Types: Cigarettes     Last attempt to quit: 2004     Years since quittin.4    Smokeless tobacco: Never Used   Substance Use Topics    Alcohol use: Yes     Alcohol/week: 3.0 - 6.0 oz     Types: 5 - 10 Standard drinks or equivalent per week     Comment: 1 glass scotch a day    Drug use: No     Review of Systems   Constitutional: Negative for chills, fatigue and fever.   Respiratory: Negative for cough, chest tightness and shortness of breath.    Cardiovascular: Negative for chest pain, palpitations and leg swelling.   Gastrointestinal: Positive for diarrhea. Negative for abdominal pain, anal bleeding, blood in stool, constipation, nausea, rectal pain and vomiting.   Endocrine: Negative for polyuria.   Genitourinary: Negative for difficulty urinating, dysuria, flank pain, frequency, hematuria and urgency.   Musculoskeletal: Negative for back pain, gait problem, joint swelling and myalgias.   Skin: Negative for color change and pallor.   Neurological: Negative for speech difficulty, weakness, light-headedness, numbness and headaches.   Hematological: Does not bruise/bleed easily.       Physical Exam     Initial Vitals [18 1655]   BP Pulse Resp Temp SpO2   (!) 188/80 84 17 98.6 °F (37 °C) 96 %      MAP       --         Physical Exam    Nursing note and vitals reviewed.  Constitutional: She appears well-developed and well-nourished. She is not diaphoretic. No distress.   HENT:   Head: Normocephalic and atraumatic.   Eyes: EOM are normal. Pupils are equal, round, and reactive to light.   Neck:  Normal range of motion. Neck supple.   Cardiovascular: Normal rate, regular rhythm, normal heart sounds and intact distal pulses. Exam reveals no gallop and no friction rub.    No murmur heard.  Pulmonary/Chest: Breath sounds normal. No respiratory distress. She has no wheezes. She has no rhonchi. She has no rales. She exhibits no tenderness.   Abdominal: Soft. Bowel sounds are normal. She exhibits no distension and no mass. There is no tenderness. There is no rebound and no guarding.   Musculoskeletal: Normal range of motion. She exhibits no edema or tenderness.   Neurological: She is alert and oriented to person, place, and time. She has normal strength.   Skin: Skin is warm and dry.   Psychiatric: She has a normal mood and affect.         ED Course   Procedures  Labs Reviewed   ISTAT PROCEDURE - Abnormal; Notable for the following components:       Result Value    POC Sodium 134 (*)     POC Potassium 2.4 (*)     POC Chloride 83 (*)     POC TCO2 (MEASURED) 35 (*)     POC Ionized Calcium 1.02 (*)     All other components within normal limits   ISTAT PROCEDURE - Abnormal; Notable for the following components:    POC Glucose 133 (*)     POC Chloride 93 (*)     POC TCO2 (MEASURED) 33 (*)     POC Ionized Calcium 1.04 (*)     All other components within normal limits   MAGNESIUM   MAGNESIUM     EKG Readings: (Independently Interpreted)   Rhythm: Normal Sinus Rhythm. Heart Rate: 74.   Prolonged QT noted at 528. When compared to March 8, 2016 she had normal QT at 413.   Other EKG Interpretations: Rhythm: Normal Sinus Rhythm. Heart Rate: 75.   Corrected QT now at 439. No STEMI.       Imaging Results    None          Medical Decision Making:   History:   Old Medical Records: I decided to obtain old medical records.  Clinical Tests:   Lab Tests: Ordered and Reviewed  Medical Tests: Ordered and Reviewed  Other:   I have discussed this case with another health care provider.       APC / Resident Notes:   73-year-old female  presenting with hypokalemia noted on outpatient labs.  Patient reports mild mental fogginess without other symptoms. Stable vitals all normal exam.  Suspect hypokalemia secondary to diarrhea.  Will recheck i-STAT, do EKG, give fluids and p.o. potassium and reassess.    I-STAT shows potassium of 2.4.  EKG with QT prolongation.  Patient given 40 mEq of potassium x2.  Recheck i-STAT shows potassium of 3.5, QTC normalized.  Given improvement of EKG changes and serum potassium, I do not believe she requires further ED treatment and is stable for discharge. Discussed the importance of follow-up, strict ED return precautions given.  Patient voiced understanding is comfortable with discharge. I discussed this patient with my supervising physician.    Ina Alaniz PA-C         Scribchristine Attestation:   Scribe #1: I performed the above scribed service and the documentation accurately describes the services I performed. I attest to the accuracy of the note.    Attending Attestation:     Physician Attestation Statement for NP/PA:   I discussed this assessment and plan of this patient with the NP/PA, but I did not personally examine the patient. The face to face encounter was performed by the NP/PA.            I, Dr. Anabelle Becerra, personally performed the services described in this documentation. All medical record entries made by the scribe were at my direction and in my presence.  I have reviewed the chart and agree that the record reflects my personal performance and is accurate and complete. Anabelle Becerra MD.  7:26 PM 08/30/2018             Clinical Impression:   The encounter diagnosis was Hypokalemia.      Disposition:   Disposition: Discharged  Condition: Stable                        Ina Alaniz PA-C  08/28/18 0102       Anabelle Becerra MD  08/30/18 1926

## 2018-08-27 NOTE — ED TRIAGE NOTES
Had labs this am - referred to ER for potassium of 2.2. Denies weakness or chest pain . AAOx4, skin w/d, respirations even and inlabored.

## 2018-08-27 NOTE — TELEPHONE ENCOUNTER
Pt informed extremely low potassium pt at high risk for arrhythmia had extreme diarrhea over the weekend- advised ED son is bringing her.  Called charge nurse to tell that she is coming in.  Advised only  9 min wait in ED.  She understands and is on her way now.

## 2018-08-27 NOTE — ED NOTES
LOC: The patient is awake and alert; oriented x 3 and speaking appropriately.  APPEARANCE: Patient resting comfortably, patient is clean and well groomed  SKIN: warm and dry, normal skin turgor & moist mucus membranes, skin intact, no breakdown noted.  MUSCULOSKELETAL: Patient moving all extremities well, no obvious swelling or deformities noted  RESPIRATORY: Airway is open and patent,  respirations are spontaneous, normal effort and rate  CARDIAC: Patient has a normal rate, no peripheral edema noted, capillary refill < 3 seconds; No complaints of chest pain   ABDOMEN: Soft and non tender to palpation, no distention noted. Bowel sounds present x 4

## 2018-08-28 ENCOUNTER — TELEPHONE (OUTPATIENT)
Dept: INTERNAL MEDICINE | Facility: CLINIC | Age: 73
End: 2018-08-28

## 2018-08-28 NOTE — TELEPHONE ENCOUNTER
----- Message from Dayanara Tsai sent at 8/28/2018 10:14 AM CDT -----  Contact: Patient [phone]  Telephone consult/Sooner Appointment    The Emergency Room told her to follow up with you in 7 days. She is available anytime of day from September 4th thru the 7th.    Thank you

## 2018-08-28 NOTE — DISCHARGE INSTRUCTIONS
Please schedule an appointment with your Primary Care doctor for follow up. If you start to feel very weak, have severe muscle cramping, have chest pain or palpitations, please come back to the Emergency Department.

## 2018-08-28 NOTE — TELEPHONE ENCOUNTER
Patient does not ant to make a ER follow up appointment she would rather speak with you       Please Advise  Thank You

## 2018-08-29 ENCOUNTER — HOSPITAL ENCOUNTER (OUTPATIENT)
Dept: RADIOLOGY | Facility: HOSPITAL | Age: 73
Discharge: HOME | End: 2018-08-29
Attending: INTERNAL MEDICINE
Payer: MEDICARE

## 2018-08-29 DIAGNOSIS — R05.9 COUGH: ICD-10-CM

## 2018-08-29 PROCEDURE — 71250 CT THORAX DX C-: CPT | Mod: TC

## 2018-08-29 PROCEDURE — 71250 CT THORAX DX C-: CPT | Mod: 26,,, | Performed by: RADIOLOGY

## 2018-08-30 ENCOUNTER — TELEPHONE (OUTPATIENT)
Dept: INTERNAL MEDICINE | Facility: CLINIC | Age: 73
End: 2018-08-30

## 2018-08-30 DIAGNOSIS — E87.6 HYPOKALEMIA: Primary | ICD-10-CM

## 2018-08-30 NOTE — TELEPHONE ENCOUNTER
patient was seen in the ER 08/27/18 she is requesting to speak with you or get a hospital follow up       Please Advise  Thank You

## 2018-08-30 NOTE — TELEPHONE ENCOUNTER
----- Message from Yamini Dexter sent at 8/30/2018  8:50 AM CDT -----  Contact: self / [phone]  Pt is calling to speak with someone in regards to getting an appt. Pt states that she had to go to the hospital E.R per . Pt would like someone to give her a call back. Please advise.      Thanks

## 2018-08-31 ENCOUNTER — PATIENT MESSAGE (OUTPATIENT)
Dept: INTERNAL MEDICINE | Facility: CLINIC | Age: 73
End: 2018-08-31

## 2018-08-31 NOTE — TELEPHONE ENCOUNTER
Spoke with patient and scheduled her hospital follow up for 09/0418 12pm      Please Advise  Thank You

## 2018-09-04 DIAGNOSIS — J40 BRONCHITIS: ICD-10-CM

## 2018-09-07 RX ORDER — ALBUTEROL SULFATE 90 UG/1
AEROSOL, METERED RESPIRATORY (INHALATION)
Qty: 18 INHALER | Refills: 1 | Status: SHIPPED | OUTPATIENT
Start: 2018-09-07 | End: 2018-10-30 | Stop reason: SDUPTHER

## 2018-10-29 ENCOUNTER — TELEPHONE (OUTPATIENT)
Dept: INTERNAL MEDICINE | Facility: CLINIC | Age: 73
End: 2018-10-29

## 2018-10-29 ENCOUNTER — LAB VISIT (OUTPATIENT)
Dept: LAB | Facility: HOSPITAL | Age: 73
End: 2018-10-29
Attending: INTERNAL MEDICINE
Payer: MEDICARE

## 2018-10-29 DIAGNOSIS — E87.6 HYPOKALEMIA: Primary | ICD-10-CM

## 2018-10-29 DIAGNOSIS — R94.6 ABNORMAL RESULTS OF THYROID FUNCTION STUDIES: ICD-10-CM

## 2018-10-29 DIAGNOSIS — E87.6 HYPOKALEMIA: ICD-10-CM

## 2018-10-29 LAB
ALBUMIN SERPL BCP-MCNC: 3.6 G/DL
ALP SERPL-CCNC: 74 U/L
ALT SERPL W/O P-5'-P-CCNC: 31 U/L
ANION GAP SERPL CALC-SCNC: 16 MMOL/L
AST SERPL-CCNC: 56 U/L
BASOPHILS # BLD AUTO: 0.07 K/UL
BASOPHILS NFR BLD: 1.3 %
BILIRUB SERPL-MCNC: 0.5 MG/DL
BUN SERPL-MCNC: 8 MG/DL
CALCIUM SERPL-MCNC: 10.2 MG/DL
CHLORIDE SERPL-SCNC: 94 MMOL/L
CO2 SERPL-SCNC: 35 MMOL/L
CREAT SERPL-MCNC: 0.8 MG/DL
DIFFERENTIAL METHOD: ABNORMAL
EOSINOPHIL # BLD AUTO: 0.2 K/UL
EOSINOPHIL NFR BLD: 3.2 %
ERYTHROCYTE [DISTWIDTH] IN BLOOD BY AUTOMATED COUNT: 13.2 %
EST. GFR  (AFRICAN AMERICAN): >60 ML/MIN/1.73 M^2
EST. GFR  (NON AFRICAN AMERICAN): >60 ML/MIN/1.73 M^2
GLUCOSE SERPL-MCNC: 70 MG/DL
HCT VFR BLD AUTO: 43.4 %
HGB BLD-MCNC: 14.3 G/DL
IMM GRANULOCYTES # BLD AUTO: 0.02 K/UL
IMM GRANULOCYTES NFR BLD AUTO: 0.4 %
LYMPHOCYTES # BLD AUTO: 2.5 K/UL
LYMPHOCYTES NFR BLD: 43.9 %
MCH RBC QN AUTO: 33.3 PG
MCHC RBC AUTO-ENTMCNC: 32.9 G/DL
MCV RBC AUTO: 101 FL
MONOCYTES # BLD AUTO: 0.7 K/UL
MONOCYTES NFR BLD: 12.7 %
NEUTROPHILS # BLD AUTO: 2.2 K/UL
NEUTROPHILS NFR BLD: 38.5 %
NRBC BLD-RTO: 0 /100 WBC
PLATELET # BLD AUTO: 191 K/UL
PMV BLD AUTO: 10.6 FL
POTASSIUM SERPL-SCNC: 2.7 MMOL/L
PROT SERPL-MCNC: 6.9 G/DL
RBC # BLD AUTO: 4.3 M/UL
SODIUM SERPL-SCNC: 145 MMOL/L
WBC # BLD AUTO: 5.58 K/UL

## 2018-10-29 PROCEDURE — 80053 COMPREHEN METABOLIC PANEL: CPT

## 2018-10-29 PROCEDURE — 36415 COLL VENOUS BLD VENIPUNCTURE: CPT | Mod: PO

## 2018-10-29 PROCEDURE — 85025 COMPLETE CBC W/AUTO DIFF WBC: CPT

## 2018-10-29 RX ORDER — POTASSIUM CHLORIDE 20 MEQ/1
TABLET, EXTENDED RELEASE ORAL
Qty: 30 TABLET | Refills: 3 | Status: SHIPPED | OUTPATIENT
Start: 2018-10-29 | End: 2019-04-01 | Stop reason: SDUPTHER

## 2018-10-29 NOTE — TELEPHONE ENCOUNTER
Potassium 2.7 - she is not on a diuretic - she is seeing me tomorrow - please see if she can do a lab at main campus at 2PM and see me here at 3 as planned.

## 2018-10-30 ENCOUNTER — LAB VISIT (OUTPATIENT)
Dept: LAB | Facility: HOSPITAL | Age: 73
End: 2018-10-30
Attending: INTERNAL MEDICINE
Payer: MEDICARE

## 2018-10-30 ENCOUNTER — OFFICE VISIT (OUTPATIENT)
Dept: INTERNAL MEDICINE | Facility: CLINIC | Age: 73
End: 2018-10-30
Payer: MEDICARE

## 2018-10-30 VITALS
HEART RATE: 75 BPM | DIASTOLIC BLOOD PRESSURE: 72 MMHG | SYSTOLIC BLOOD PRESSURE: 140 MMHG | HEIGHT: 67 IN | WEIGHT: 127.88 LBS | BODY MASS INDEX: 20.07 KG/M2 | OXYGEN SATURATION: 95 % | TEMPERATURE: 99 F

## 2018-10-30 DIAGNOSIS — E87.6 HYPOKALEMIA: ICD-10-CM

## 2018-10-30 DIAGNOSIS — R94.6 ABNORMAL RESULTS OF THYROID FUNCTION STUDIES: ICD-10-CM

## 2018-10-30 DIAGNOSIS — J40 BRONCHITIS: ICD-10-CM

## 2018-10-30 DIAGNOSIS — R79.89 ELEVATED LFTS: Primary | ICD-10-CM

## 2018-10-30 LAB
ALBUMIN SERPL BCP-MCNC: 3.9 G/DL
ALP SERPL-CCNC: 75 U/L
ALT SERPL W/O P-5'-P-CCNC: 32 U/L
ANION GAP SERPL CALC-SCNC: 14 MMOL/L
AST SERPL-CCNC: 52 U/L
BILIRUB SERPL-MCNC: 0.6 MG/DL
BUN SERPL-MCNC: 21 MG/DL
CALCIUM SERPL-MCNC: 9.7 MG/DL
CHLORIDE SERPL-SCNC: 96 MMOL/L
CO2 SERPL-SCNC: 33 MMOL/L
CREAT SERPL-MCNC: 1 MG/DL
EST. GFR  (AFRICAN AMERICAN): >60 ML/MIN/1.73 M^2
EST. GFR  (NON AFRICAN AMERICAN): 56 ML/MIN/1.73 M^2
GLUCOSE SERPL-MCNC: 123 MG/DL
MAGNESIUM SERPL-MCNC: 2 MG/DL
POTASSIUM SERPL-SCNC: 3.5 MMOL/L
PROT SERPL-MCNC: 6.9 G/DL
SODIUM SERPL-SCNC: 143 MMOL/L
TSH SERPL DL<=0.005 MIU/L-ACNC: 0.91 UIU/ML

## 2018-10-30 PROCEDURE — 83735 ASSAY OF MAGNESIUM: CPT

## 2018-10-30 PROCEDURE — 99214 OFFICE O/P EST MOD 30 MIN: CPT | Mod: PBBFAC,PO | Performed by: INTERNAL MEDICINE

## 2018-10-30 PROCEDURE — 3077F SYST BP >= 140 MM HG: CPT | Mod: CPTII,,, | Performed by: INTERNAL MEDICINE

## 2018-10-30 PROCEDURE — 36415 COLL VENOUS BLD VENIPUNCTURE: CPT

## 2018-10-30 PROCEDURE — 80053 COMPREHEN METABOLIC PANEL: CPT

## 2018-10-30 PROCEDURE — 3078F DIAST BP <80 MM HG: CPT | Mod: CPTII,,, | Performed by: INTERNAL MEDICINE

## 2018-10-30 PROCEDURE — 1101F PT FALLS ASSESS-DOCD LE1/YR: CPT | Mod: CPTII,,, | Performed by: INTERNAL MEDICINE

## 2018-10-30 PROCEDURE — 99213 OFFICE O/P EST LOW 20 MIN: CPT | Mod: S$PBB,,, | Performed by: INTERNAL MEDICINE

## 2018-10-30 PROCEDURE — 99999 PR PBB SHADOW E&M-EST. PATIENT-LVL IV: CPT | Mod: PBBFAC,,, | Performed by: INTERNAL MEDICINE

## 2018-10-30 PROCEDURE — 84443 ASSAY THYROID STIM HORMONE: CPT

## 2018-10-30 RX ORDER — ALBUTEROL SULFATE 90 UG/1
AEROSOL, METERED RESPIRATORY (INHALATION)
Qty: 1 INHALER | Refills: 3 | Status: SHIPPED | OUTPATIENT
Start: 2018-10-30 | End: 2019-11-05 | Stop reason: SDUPTHER

## 2018-10-30 RX ORDER — ALPRAZOLAM 0.25 MG/1
TABLET ORAL
Qty: 20 TABLET | Refills: 0 | Status: SHIPPED | OUTPATIENT
Start: 2018-10-30 | End: 2019-02-05 | Stop reason: SDUPTHER

## 2018-10-30 NOTE — TELEPHONE ENCOUNTER
Spoke with patient, scheduled her l;ab appointment for today 10/30/18 at 2pm at the [facility]. Patient agreed to go today before her appointment with PCP at 3pm

## 2018-11-01 NOTE — PROGRESS NOTES
Subjective:       Patient ID: Jacqueline O Favret is a 73 y.o. female.    Chief Complaint: Follow-up    HPIPt with hypokalemia - improved today after potassium  Unclear etiology.  LFTs up - she is drinking some alcohol.    Review of Systems   Respiratory: Negative for shortness of breath (PND or orthopnea).    Cardiovascular: Negative for chest pain (arm pain or jaw pain).   Gastrointestinal: Negative for abdominal pain, diarrhea, nausea and vomiting.   Genitourinary: Negative for dysuria.   Neurological: Negative for seizures, syncope and headaches.       Objective:      Physical Exam   Constitutional: She is oriented to person, place, and time. She appears well-developed and well-nourished. No distress.   HENT:   Head: Normocephalic.   Mouth/Throat: Oropharynx is clear and moist.   Neck: Neck supple. No JVD present. No thyromegaly present.   Cardiovascular: Normal rate, regular rhythm, normal heart sounds and intact distal pulses. Exam reveals no gallop and no friction rub.   No murmur heard.  Pulmonary/Chest: Effort normal and breath sounds normal. She has no wheezes. She has no rales.   Abdominal: Soft. Bowel sounds are normal. She exhibits no distension and no mass. There is no tenderness. There is no rebound and no guarding.   Musculoskeletal: She exhibits no edema.   Lymphadenopathy:     She has no cervical adenopathy.   Neurological: She is alert and oriented to person, place, and time. She has normal reflexes.   Skin: Skin is warm and dry.   Psychiatric: She has a normal mood and affect. Her behavior is normal. Judgment and thought content normal.       Assessment:       1. Elevated LFTs    2. Bronchitis        Plan:   Elevated LFTs  -     Comprehensive metabolic panel; Future; Expected date: 10/30/2018  -     Hepatitis panel, acute; Future; Expected date: 10/30/2018    Bronchitis  -     albuterol (VENTOLIN HFA) 90 mcg/actuation inhaler; INHALE 1-2 PUFFS INTO THE LUNGS EVERY 4 (FOUR) HOURS AS NEEDED.   Dispense: 1 Inhaler; Refill: 3    Other orders  -     varicella-zoster [id number], PF, (SHINGRIX, PF,) 50 mcg/0.5 mL injection; Inject 0.5 mLs into the muscle once. for 1 dose  Dispense: 0.5 mL; Refill: 0  -     ALPRAZolam (XANAX) 0.25 MG tablet; TAKE 1/2 TO 1 TABLET BY MOUTH BEFORE FLIGHT  Dispense: 20 tablet; Refill: 0    Continue one 20MEq potassium daily

## 2018-11-05 ENCOUNTER — PATIENT MESSAGE (OUTPATIENT)
Dept: INTERNAL MEDICINE | Facility: CLINIC | Age: 73
End: 2018-11-05

## 2018-11-05 RX ORDER — RAMIPRIL 10 MG/1
10 CAPSULE ORAL 2 TIMES DAILY
Qty: 180 CAPSULE | Refills: 0 | Status: SHIPPED | OUTPATIENT
Start: 2018-11-05 | End: 2019-02-02 | Stop reason: SDUPTHER

## 2018-11-05 NOTE — TELEPHONE ENCOUNTER
Spoke to patient, advised per MD to drink plenty of fluids, elevate legs and if symptoms worsen, call MD's office or visit an Urgent Care to assess. Other than that, will see what lab work shows on the 14th of November. Verbalized understanding.

## 2018-11-06 RX ORDER — OMEPRAZOLE 20 MG/1
20 CAPSULE, DELAYED RELEASE ORAL DAILY
Qty: 90 CAPSULE | Refills: 1 | Status: ON HOLD | OUTPATIENT
Start: 2018-11-06 | End: 2019-04-06 | Stop reason: HOSPADM

## 2018-11-08 ENCOUNTER — OFFICE VISIT (OUTPATIENT)
Dept: OTOLARYNGOLOGY | Facility: CLINIC | Age: 73
End: 2018-11-08
Payer: MEDICARE

## 2018-11-08 VITALS
DIASTOLIC BLOOD PRESSURE: 75 MMHG | BODY MASS INDEX: 20.27 KG/M2 | SYSTOLIC BLOOD PRESSURE: 168 MMHG | WEIGHT: 129.44 LBS | HEART RATE: 74 BPM | TEMPERATURE: 99 F

## 2018-11-08 DIAGNOSIS — J34.89 NASAL SORE: Primary | ICD-10-CM

## 2018-11-08 PROCEDURE — 3078F DIAST BP <80 MM HG: CPT | Mod: CPTII,S$GLB,, | Performed by: OTOLARYNGOLOGY

## 2018-11-08 PROCEDURE — 3077F SYST BP >= 140 MM HG: CPT | Mod: CPTII,S$GLB,, | Performed by: OTOLARYNGOLOGY

## 2018-11-08 PROCEDURE — 1101F PT FALLS ASSESS-DOCD LE1/YR: CPT | Mod: CPTII,S$GLB,, | Performed by: OTOLARYNGOLOGY

## 2018-11-08 PROCEDURE — 99999 PR PBB SHADOW E&M-EST. PATIENT-LVL III: CPT | Mod: PBBFAC,,, | Performed by: OTOLARYNGOLOGY

## 2018-11-08 PROCEDURE — 99213 OFFICE O/P EST LOW 20 MIN: CPT | Mod: S$GLB,,, | Performed by: OTOLARYNGOLOGY

## 2018-11-08 NOTE — PROGRESS NOTES
Chief Complaint   Patient presents with    sore in right nostril       HPI   73 y.o. female presents for evaluation of a persistent R nasal sore.  She reports that this problem has been present for several weeks.  She reports that this lesion has not healed despite application of topical Vaseline.  She reports some pain. She denies antecedent trauma or rhinorrhea.     Review of Systems   Constitutional: Negative for fatigue and unexpected weight change.   HENT: Per HPI.  Eyes: Negative for visual disturbance.   Respiratory: Negative for shortness of breath, hemoptysis   Cardiovascular: Negative for chest pain and palpitations.   Musculoskeletal: Negative for decreased ROM, back pain.   Skin: Negative for rash, sunburn, itching.   Neurological: Negative for dizziness and seizures.   Hematological: Negative for adenopathy. Does not bruise/bleed easily.   Endocrine: Negative for rapid weight loss/weight gain, heat/cold intolerance.     Past Medical History   Patient Active Problem List   Diagnosis    Hypertension    Hyperlipidemia    Coronary artery disease    Adjustment disorder with depressed mood    Status post non-ST elevation myocardial infarction (NSTEMI)    Colon adenoma    Personal history of skin cancer    Weakness of left lower extremity    Food impaction of esophagus    Dysphagia           Past Surgical History   Past Surgical History:   Procedure Laterality Date    blephroplasty      BREAST BIOPSY Right     excisional 1985    BUNIONECTOMY  Jan 2013    right foot    CARDIAC CATHETERIZATION      COLONOSCOPY N/A 9/20/2016    Procedure: COLONOSCOPY;  Surgeon: Rachelle Guerra MD;  Location: [address]);  Service: Endoscopy;  Laterality: N/A;    COLONOSCOPY N/A 12/21/2016    Procedure: COLONOSCOPY;  Surgeon: Freedom Sandoval MD;  Location: [address]);  Service: Endoscopy;  Laterality: N/A;  PREP: PREPOPIK  SCGEDULKE EAFRLY IN [facility]    COLONOSCOPY N/A 12/21/2016    Performed  by Freedom Sandoval MD at [facility] ENDO (4TH FLR)    COLONOSCOPY N/A 9/20/2016    Performed by Rachelle Guerra MD at [facility] ENDO (4TH FLR)    EGD (ESOPHAGOGASTRODUODENOSCOPY) N/A 7/19/2018    Performed by Jefferson Mina MD at [facility] ENDO (4TH FLR)    ESOPHAGOGASTRODUODENOSCOPY N/A 7/19/2018    Procedure: EGD (ESOPHAGOGASTRODUODENOSCOPY);  Surgeon: Jefferson Mina MD;  Location: [facility] ENDO (4TH FLR);  Service: Endoscopy;  Laterality: N/A;    FOOT SURGERY      right foot    HYSTERECTOMY  1980s    bleeding    OPEN REDUCTION INTERNAL FIXATION-ORBITAL FRACTURE Right 1/23/2015    Performed by Lonny Thakur MD at [facility] OR 2ND FLR    OPEN REDUCTION INTERNAL FIXATION-ORBITAL FRACTURE Right 10/22/2014    Performed by Lonny Thakur MD at [facility] OR 2ND FLR    orbital fx      REPAIR-ECTROPION Bilateral 3/29/2016    Performed by Lisa Gao MD at [facility] OR 1ST FLR    REPAIR-PTOSIS Bilateral 3/29/2016    Performed by Lisa Gao MD at [facility] OR 1ST FLR    TONSILLECTOMY      VAGINA SURGERY           Family History   Family History   Problem Relation Age of Onset    Diabetes Mother     Heart disease Father 57        sudden death    Celiac disease Neg Hx     Colon cancer Neg Hx     Crohn's disease Neg Hx     Esophageal cancer Neg Hx     Inflammatory bowel disease Neg Hx     Irritable bowel syndrome Neg Hx     Liver cancer Neg Hx     Rectal cancer Neg Hx     Stomach cancer Neg Hx     Ulcerative colitis Neg Hx     Melanoma Neg Hx            Social History   .  Social History     Socioeconomic History    Marital status:      Spouse name: Not on file    Number of children: Not on file    Years of education: Not on file    Highest education level: Not on file   Social Needs    Financial resource strain: Not on file    Food insecurity - worry: Not on file    Food insecurity - inability: Not on file    Transportation needs - medical: Not on file    Transportation needs - non-medical: Not on  file   Occupational History    Not on file   Tobacco Use    Smoking status: Former Smoker     Packs/day: 1.00     Years: 15.00     Pack years: 15.00     Types: Cigarettes     Last attempt to quit: 2004     Years since quittin.6    Smokeless tobacco: Never Used   Substance and Sexual Activity    Alcohol use: Yes     Alcohol/week: 3.0 - 6.0 oz     Types: 5 - 10 Standard drinks or equivalent per week     Comment: 1 glass scotch a day    Drug use: No    Sexual activity: No     Partners: Male     Birth control/protection: None   Other Topics Concern    Are you pregnant or think you may be? Not Asked    Breast-feeding Not Asked   Social History Narrative    Not on file         Allergies   Review of patient's allergies indicates:  No Known Allergies        Physical Exam     Vitals:    18 0829   BP: (!) 168/75   Pulse: 74   Temp: 99.1 °F (37.3 °C)         Body mass index is 20.27 kg/m².      General: AOx3, NAD   Respiratory:  Symmetric chest rise, normal effort  Nose: No gross nasal septal deviation. Inferior Turbinates WNL bilaterally.  Minimal healing sore R nasal vestibule. No septal perforation. No masses/lesions.   Oral Cavity:  Oral Tongue mobile, no lesions noted. Hard Palate WNL. No buccal or FOM lesions.  Oropharynx:  No masses/lesions of the posterior pharyngeal wall. Tonsillar fossa without lesions. Soft palate without masses. Midline uvula.   Neck: No scars.  No cervical lymphadenopathy, thyromegaly or thyroid nodules.  Normal range of motion.    Face: House Brackmann I bilaterally.     Assessment/Plan  Problem List Items Addressed This Visit        ENT    Nasal sore - Primary     Nasal lesion. Slowly healing.  Apply Mupirocin BID for 7 days.  RTC PRN.

## 2018-11-14 ENCOUNTER — LAB VISIT (OUTPATIENT)
Dept: LAB | Facility: HOSPITAL | Age: 73
End: 2018-11-14
Attending: INTERNAL MEDICINE
Payer: MEDICARE

## 2018-11-14 DIAGNOSIS — R79.89 ELEVATED LFTS: ICD-10-CM

## 2018-11-14 LAB
ALBUMIN SERPL BCP-MCNC: 3.5 G/DL
ALP SERPL-CCNC: 87 U/L
ALT SERPL W/O P-5'-P-CCNC: 30 U/L
ANION GAP SERPL CALC-SCNC: 15 MMOL/L
AST SERPL-CCNC: 65 U/L
BILIRUB SERPL-MCNC: 0.5 MG/DL
BUN SERPL-MCNC: 27 MG/DL
CALCIUM SERPL-MCNC: 9.8 MG/DL
CHLORIDE SERPL-SCNC: 97 MMOL/L
CO2 SERPL-SCNC: 28 MMOL/L
CREAT SERPL-MCNC: 2.6 MG/DL
EST. GFR  (AFRICAN AMERICAN): 20.5 ML/MIN/1.73 M^2
EST. GFR  (NON AFRICAN AMERICAN): 17.8 ML/MIN/1.73 M^2
GLUCOSE SERPL-MCNC: 166 MG/DL
POTASSIUM SERPL-SCNC: 3.7 MMOL/L
PROT SERPL-MCNC: 6.8 G/DL
SODIUM SERPL-SCNC: 140 MMOL/L

## 2018-11-14 PROCEDURE — 80053 COMPREHEN METABOLIC PANEL: CPT

## 2018-11-14 PROCEDURE — 36415 COLL VENOUS BLD VENIPUNCTURE: CPT | Mod: PO

## 2018-11-14 PROCEDURE — 80074 ACUTE HEPATITIS PANEL: CPT

## 2018-11-15 ENCOUNTER — LAB VISIT (OUTPATIENT)
Dept: LAB | Facility: HOSPITAL | Age: 73
End: 2018-11-15
Attending: INTERNAL MEDICINE
Payer: MEDICARE

## 2018-11-15 ENCOUNTER — TELEPHONE (OUTPATIENT)
Dept: INTERNAL MEDICINE | Facility: CLINIC | Age: 73
End: 2018-11-15

## 2018-11-15 DIAGNOSIS — N19 RENAL FAILURE, UNSPECIFIED CHRONICITY: ICD-10-CM

## 2018-11-15 DIAGNOSIS — N19 RENAL FAILURE, UNSPECIFIED CHRONICITY: Primary | ICD-10-CM

## 2018-11-15 DIAGNOSIS — N28.9 RENAL INSUFFICIENCY: Primary | ICD-10-CM

## 2018-11-15 LAB
ANION GAP SERPL CALC-SCNC: 18 MMOL/L
BUN SERPL-MCNC: 22 MG/DL
CALCIUM SERPL-MCNC: 9.6 MG/DL
CHLORIDE SERPL-SCNC: 93 MMOL/L
CO2 SERPL-SCNC: 26 MMOL/L
CREAT SERPL-MCNC: 2 MG/DL
EST. GFR  (AFRICAN AMERICAN): 28.1 ML/MIN/1.73 M^2
EST. GFR  (NON AFRICAN AMERICAN): 24.4 ML/MIN/1.73 M^2
GLUCOSE SERPL-MCNC: 93 MG/DL
HAV IGM SERPL QL IA: NEGATIVE
HBV CORE IGM SERPL QL IA: NEGATIVE
HBV SURFACE AG SERPL QL IA: NEGATIVE
HCV AB SERPL QL IA: NEGATIVE
POTASSIUM SERPL-SCNC: 3.7 MMOL/L
SODIUM SERPL-SCNC: 137 MMOL/L

## 2018-11-15 PROCEDURE — 80048 BASIC METABOLIC PNL TOTAL CA: CPT

## 2018-11-15 PROCEDURE — 36415 COLL VENOUS BLD VENIPUNCTURE: CPT

## 2018-11-15 NOTE — TELEPHONE ENCOUNTER
Pt with renal failure on lab - asymptomatic - no leg swelling now - stopped potassium.  Denies SOB or other sx - may be spurious.  Denies vomiting or diarrhea.  Eating and drinking normally - took one aleve over the last two weeks.  Will recheck today if still elevated will admit.    Please schedule BMP at 9:30 Am at [facility] thanks.

## 2018-11-16 ENCOUNTER — HOSPITAL ENCOUNTER (OUTPATIENT)
Dept: RADIOLOGY | Facility: HOSPITAL | Age: 73
Discharge: HOME | End: 2018-11-16
Attending: INTERNAL MEDICINE
Payer: MEDICARE

## 2018-11-16 DIAGNOSIS — N28.9 RENAL INSUFFICIENCY: ICD-10-CM

## 2018-11-16 PROCEDURE — 76770 US EXAM ABDO BACK WALL COMP: CPT | Mod: TC

## 2018-11-16 PROCEDURE — 76770 US EXAM ABDO BACK WALL COMP: CPT | Mod: 26,,, | Performed by: RADIOLOGY

## 2018-11-16 NOTE — TELEPHONE ENCOUNTER
Spoke with patient, let her know I scheduled her US for today at 4:15 at the imaging center. And her lab on Saturday 11/17/18 . patients agreed to dates and time.

## 2018-11-16 NOTE — TELEPHONE ENCOUNTER
Pt schedule pt for approx 3Pm today for renal ultrasound.    Please see proevious note about lab and urine - thanks.

## 2018-11-16 NOTE — TELEPHONE ENCOUNTER
Pt with improved creatinine.  Pt will push fluids - stop any NSAIDS.  Please schedule BMP and U/A and urine for eosinophils on Saturday at primary care thanks.

## 2018-11-17 ENCOUNTER — PATIENT MESSAGE (OUTPATIENT)
Dept: INTERNAL MEDICINE | Facility: CLINIC | Age: 73
End: 2018-11-17

## 2018-11-17 ENCOUNTER — LAB VISIT (OUTPATIENT)
Dept: LAB | Facility: HOSPITAL | Age: 73
End: 2018-11-17
Attending: INTERNAL MEDICINE
Payer: MEDICARE

## 2018-11-17 DIAGNOSIS — N28.9 RENAL INSUFFICIENCY: ICD-10-CM

## 2018-11-17 LAB
ANION GAP SERPL CALC-SCNC: 15 MMOL/L
BUN SERPL-MCNC: 16 MG/DL
CALCIUM SERPL-MCNC: 10.3 MG/DL
CHLORIDE SERPL-SCNC: 90 MMOL/L
CO2 SERPL-SCNC: 30 MMOL/L
CREAT SERPL-MCNC: 2 MG/DL
EST. GFR  (AFRICAN AMERICAN): 28.1 ML/MIN/1.73 M^2
EST. GFR  (NON AFRICAN AMERICAN): 24.4 ML/MIN/1.73 M^2
GLUCOSE SERPL-MCNC: 105 MG/DL
POTASSIUM SERPL-SCNC: 3.6 MMOL/L
SODIUM SERPL-SCNC: 135 MMOL/L

## 2018-11-17 PROCEDURE — 36415 COLL VENOUS BLD VENIPUNCTURE: CPT

## 2018-11-17 PROCEDURE — 80048 BASIC METABOLIC PNL TOTAL CA: CPT

## 2018-11-21 ENCOUNTER — OFFICE VISIT (OUTPATIENT)
Dept: OPTOMETRY | Facility: CLINIC | Age: 73
End: 2018-11-21

## 2018-11-21 ENCOUNTER — OFFICE VISIT (OUTPATIENT)
Dept: OPTOMETRY | Facility: CLINIC | Age: 73
End: 2018-11-21
Payer: MEDICARE

## 2018-11-21 DIAGNOSIS — Z46.0 ENCOUNTER FOR FITTING OR ADJUSTMENT OF SPECTACLES OR CONTACT LENSES: Primary | ICD-10-CM

## 2018-11-21 DIAGNOSIS — H52.4 PRESBYOPIA OF BOTH EYES: ICD-10-CM

## 2018-11-21 DIAGNOSIS — H26.9 CORTICAL CATARACT OF BOTH EYES: ICD-10-CM

## 2018-11-21 DIAGNOSIS — H52.201 HYPEROPIA OF RIGHT EYE WITH ASTIGMATISM: ICD-10-CM

## 2018-11-21 DIAGNOSIS — H52.02 HYPERMETROPIA OF LEFT EYE: ICD-10-CM

## 2018-11-21 DIAGNOSIS — H25.13 NUCLEAR SCLEROSIS, BILATERAL: Primary | ICD-10-CM

## 2018-11-21 DIAGNOSIS — H52.01 HYPEROPIA OF RIGHT EYE WITH ASTIGMATISM: ICD-10-CM

## 2018-11-21 PROCEDURE — 99499 UNLISTED E&M SERVICE: CPT | Mod: S$GLB,,, | Performed by: OPTOMETRIST

## 2018-11-21 PROCEDURE — 99999 PR PBB SHADOW E&M-EST. PATIENT-LVL III: CPT | Mod: PBBFAC,,, | Performed by: OPTOMETRIST

## 2018-11-21 PROCEDURE — 92310 CONTACT LENS FITTING OU: CPT | Mod: S$GLB,,, | Performed by: OPTOMETRIST

## 2018-11-21 PROCEDURE — 92014 COMPRE OPH EXAM EST PT 1/>: CPT | Mod: S$GLB,,, | Performed by: OPTOMETRIST

## 2018-11-21 PROCEDURE — 92015 DETERMINE REFRACTIVE STATE: CPT | Mod: S$GLB,,, | Performed by: OPTOMETRIST

## 2018-11-21 RX ORDER — MIRTAZAPINE 15 MG/1
15 TABLET, FILM COATED ORAL NIGHTLY
Refills: 1 | COMMUNITY
Start: 2018-11-02 | End: 2019-11-05 | Stop reason: SDUPTHER

## 2018-11-21 NOTE — PROGRESS NOTES
HPI     Contact Lens Follow Up      Additional comments: CLFU with six month cataract check.  Patient in today for contact lens follow-up with general eye examination.    Refer to additional patient encounter notes dated 11/20/2018.                Comments     Patient in today for contact lens follow-up with general eye examination.    Refer to additional patient encounter notes dated 11/18/2018            Last edited by Jeromy Goodman, OD on 11/21/2018  9:28 AM. (History)            Assessment /Plan     For exam results, see Encounter Report.    1. Encounter for fitting or adjustment of spectacles or contact lenses                     Nuclear sclerosis of lens, consistent with age.  Early cortical cataract in both eyes, more apparent in the right eye.  No need for cataract surgery in either eye at this time.  Monitor.    Slight asymmetry in the cupping of the optic nerve head, greater in the right eye.  C/D ratio appears stable in each eye.     Optic nerve appears healthy in both eyes, and intraocular pressure remians well within normal range (and equal) on last visit.  Monitor only.    Hyperopia with astigmatism in the right eye, and hyperopia in the left eye, per post CL refraction done today.  Very satisfactory best corrected VA in each eye.  Presbyopia consistent with age.   New spectacle lens Rx issued for use in lieu of SCL.    Wearing monovision spherical SCLs (right for distance and left for near).  CL appears to be fitting well in each eye, and wearing CL well in each eye.  Power of the left CL appears fine as is (for near), but Mrs. Favret shows need for toric SCL in the right eye (for distance) to improve distance VA with the CL in.  Defer new contact lens Rx.  Will have CL department order trial toric (astigmatic) SCL for trial in the right eye:       OD  Biofinity Toric  8.7  14.5  +2.50 -1,25 x 100  distance     OS Air Optix with Hydraglyde  8.6  14.2   +2.25  near    Will schedule CL follow-up  visit for  of the new trial lens.    Repeat general eye exam and refraction in one year, as previously advised.

## 2018-11-21 NOTE — PATIENT INSTRUCTIONS
Nuclear sclerosis of lens, consistent with age.  Early cortical cataract in both eyes, more apparent in the right eye.  No need for cataract surgery in either eye at this time.  Monitor.    Slight asymmetry in the cupping of the optic nerve head, greater in the right eye.  C/D ratio appears stable in each eye.     Optic nerve appears healthy in both eyes, and intraocular pressure remians well within normal range (and equal) on last visit.  Monitor only.    Hyperopia with astigmatism in the right eye, and hyperopia in the left eye, per post CL refraction done today.  Very satisfactory best corrected VA in each eye.  Presbyopia consistent with age.   New spectacle lens Rx issued for use in lieu of SCL.    Wearing monovision spherical SCLs (right for distance and left for near).  CL appears to be fitting well in each eye, and wearing CL well in each eye.  Power of the left CL appears fine as is (for near), but Mrs. Favret shows need for toric SCL in the right eye (for distance) to improve distance VA with the CL in.  Defer new contact lens Rx.  Will have CL department order trial toric (astigmatic) SCL for trial in the right eye:    OD  Biofinity Toric  8.7  14.5  +2.50 -1,25 x 100  distance     OS Air Optix with Hydraglyde  8.6  14.2   +2.25  near  Will schedule CL follow-up visit for  of the new trial lens.    Repeat general eye exam and refraction in one year, as previously advised.

## 2018-11-21 NOTE — PROGRESS NOTES
"HPI     Concerns About Ocular Health      Additional comments: Notes distance VA/TV clearer without CLs in than   she does with monovision SCLs in, and finds distance/TV VA even better   with glasses than she uses in lieu of CLs.               Comments     Patient in for progress check.    Patient returns for distance vision changes over the last 3 months with   contact lens. Denies any HA's. No pain    Last CL Rx  Air Optix with HydraGlyde                                             OD  8.6     14.2   +0.75                                               OS 8.6      14.2   +3.50     Date last seen:  05/28/2018    Doctor last seen:      Prescribed eye medications(s) using:  None     OTC eye medication(s) using:  None     Signs/symptoms of condition resolved/better/stable/worse?:  Not as friend    Allergies/Medications reviewed today?  yes      No diabetes.  No HBP  No other significant health issues.  No family history of eye disease.  S/p bilateral blepharoplasty.  No other eye surgery.  No eye injury.  No treatment for eye disease.    No allergy to any eyedrops.    PD 61/57  Reading distance 17"                 Last edited by Jeromy Goodman, OD on 11/21/2018  9:56 AM. (History)            Assessment /Plan     For exam results, see Encounter Report.    1. Nuclear sclerosis, bilateral     2. Cortical cataract of both eyes     3. Hyperopia of right eye with astigmatism     4. Hypermetropia of left eye     5. Presbyopia of both eyes                      Nuclear sclerosis of lens, consistent with age.  Early cortical cataract in both eyes, more apparent in the right eye.  No need for cataract surgery in either eye at this time.  Monitor.    Slight asymmetry in the cupping of the optic nerve head, greater in the right eye.  C/D ratio appears stable in each eye.     Optic nerve appears healthy in both eyes, and intraocular pressure remians well within normal range (and equal) on last visit.  Monitor " only.    Hyperopia with astigmatism in the right eye, and hyperopia in the left eye, per post CL refraction done today.  Very satisfactory best corrected VA in each eye.  Presbyopia consistent with age.   New spectacle lens Rx issued for use in lieu of SCL.    Wearing monovision spherical SCLs (right for distance and left for near).  CL appears to be fitting well in each eye, and wearing CL well in each eye.  Power of the left CL appears fine as is (for near), but Mrs. Favret shows need for toric SCL in the right eye (for distance) to improve distance VA with the CL in.  Defer new contact lens Rx.  Will have CL department order trial toric (astigmatic) SCL for trial in the right eye:      OD  Biofinity Toric  8.7  14.5  +2.50 -1,25 x 100  distance       OS Air Optix with Hydraglyde  8.6  14.2   +2.25  near    Will schedule CL follow-up visit for  of the new trial lens.    Repeat general eye exam and refraction in one year, as previously advised.

## 2018-11-28 ENCOUNTER — TELEPHONE (OUTPATIENT)
Dept: OPTOMETRY | Facility: CLINIC | Age: 73
End: 2018-11-28

## 2018-11-28 ENCOUNTER — TELEPHONE (OUTPATIENT)
Dept: INTERNAL MEDICINE | Facility: CLINIC | Age: 73
End: 2018-11-28

## 2018-11-28 DIAGNOSIS — N28.9 RENAL INSUFFICIENCY: Primary | ICD-10-CM

## 2018-11-28 NOTE — TELEPHONE ENCOUNTER
----- Message from Dayanara Tsai sent at 11/28/2018  3:50 PM CST -----  Contact: Patient [phone]  Patient is returning a phone call.  Who left a message for the patient: Dr. Orozco  Does patient know what this is regarding:  Yes. She said you left a message saying you want her to have blood work done asap but, there are no orders in.  Comments:

## 2018-11-28 NOTE — TELEPHONE ENCOUNTER
Patient states Dr. Orozco left her a message to do repeat blood word      Please place orders      Please Advise  Thank You

## 2018-11-29 ENCOUNTER — LAB VISIT (OUTPATIENT)
Dept: LAB | Facility: HOSPITAL | Age: 73
End: 2018-11-29
Attending: INTERNAL MEDICINE
Payer: MEDICARE

## 2018-11-29 DIAGNOSIS — N28.9 RENAL INSUFFICIENCY: ICD-10-CM

## 2018-11-29 LAB
ALBUMIN SERPL BCP-MCNC: 3.6 G/DL
ALP SERPL-CCNC: 77 U/L
ALT SERPL W/O P-5'-P-CCNC: 26 U/L
ANION GAP SERPL CALC-SCNC: 22 MMOL/L
AST SERPL-CCNC: 45 U/L
BASOPHILS # BLD AUTO: 0.04 K/UL
BASOPHILS NFR BLD: 0.8 %
BILIRUB SERPL-MCNC: 0.6 MG/DL
BUN SERPL-MCNC: 17 MG/DL
CALCIUM SERPL-MCNC: 10.4 MG/DL
CHLORIDE SERPL-SCNC: 90 MMOL/L
CO2 SERPL-SCNC: 24 MMOL/L
CREAT SERPL-MCNC: 1.1 MG/DL
DIFFERENTIAL METHOD: ABNORMAL
EOSINOPHIL # BLD AUTO: 0 K/UL
EOSINOPHIL NFR BLD: 0.8 %
ERYTHROCYTE [DISTWIDTH] IN BLOOD BY AUTOMATED COUNT: 12.1 %
EST. GFR  (AFRICAN AMERICAN): 58 ML/MIN/1.73 M^2
EST. GFR  (NON AFRICAN AMERICAN): 50 ML/MIN/1.73 M^2
GLUCOSE SERPL-MCNC: 121 MG/DL
HCT VFR BLD AUTO: 38.7 %
HGB BLD-MCNC: 12.7 G/DL
LYMPHOCYTES # BLD AUTO: 1.1 K/UL
LYMPHOCYTES NFR BLD: 21.4 %
MCH RBC QN AUTO: 33.5 PG
MCHC RBC AUTO-ENTMCNC: 32.8 G/DL
MCV RBC AUTO: 102 FL
MONOCYTES # BLD AUTO: 0.4 K/UL
MONOCYTES NFR BLD: 8.1 %
NEUTROPHILS # BLD AUTO: 3.5 K/UL
NEUTROPHILS NFR BLD: 68.7 %
PLATELET # BLD AUTO: 226 K/UL
PMV BLD AUTO: 9.6 FL
POTASSIUM SERPL-SCNC: 3.4 MMOL/L
PROT SERPL-MCNC: 6.8 G/DL
RBC # BLD AUTO: 3.79 M/UL
SODIUM SERPL-SCNC: 136 MMOL/L
WBC # BLD AUTO: 5.04 K/UL

## 2018-11-29 PROCEDURE — 80053 COMPREHEN METABOLIC PANEL: CPT

## 2018-11-29 PROCEDURE — 36415 COLL VENOUS BLD VENIPUNCTURE: CPT

## 2018-11-29 PROCEDURE — 85025 COMPLETE CBC W/AUTO DIFF WBC: CPT

## 2018-12-04 ENCOUNTER — TELEPHONE (OUTPATIENT)
Dept: OPTOMETRY | Facility: CLINIC | Age: 73
End: 2018-12-04

## 2018-12-05 ENCOUNTER — OFFICE VISIT (OUTPATIENT)
Dept: OPTOMETRY | Facility: CLINIC | Age: 73
End: 2018-12-05

## 2018-12-05 DIAGNOSIS — Z46.0 ENCOUNTER FOR FITTING OR ADJUSTMENT OF SPECTACLES OR CONTACT LENSES: Primary | ICD-10-CM

## 2018-12-05 PROCEDURE — 92499 UNLISTED OPH SVC/PROCEDURE: CPT | Mod: ,,, | Performed by: OPTOMETRIST

## 2018-12-05 PROCEDURE — 99499 UNLISTED E&M SERVICE: CPT | Mod: S$GLB,,, | Performed by: OPTOMETRIST

## 2018-12-05 NOTE — PROGRESS NOTES
HPI     Contact Lens Follow Up      Additional comments: Mrs. Favret in to  new trial soft CLs.  Inserted new trial lenses prior to seeing patient.  Refer to CL section of record.          Last edited by Jeromy Goodman, OD on 12/5/2018 10:48 AM. (History)            Assessment /Plan     For exam results, see Encounter Report.    1. Encounter for fitting or adjustment of spectacles or contact lenses                    Dispensed new trial lenses:  OD  Biofinity Toric   8.7   14.5   +2.50 -1.25 x 100  Distance                                      OS Biofinity   8/6   14.0    +2.25   Near  Maintain daily wear.  Clean and soak lenses nightly.  Return in a few days for CL follow-up.  Have new trial lenses in for a few hours at the time of the appointment.

## 2018-12-05 NOTE — PATIENT INSTRUCTIONS
Dispensed new trial lenses:  OD  Biofinity Toric   8.7   14.5   +2.50 -1.25 x 100  Distance                                      OS Biofinity   8/6   14.0    +2.25   Near  Maintain daily wear.  Clean and soak lenses nightly.  Return in a few days for CL follow-up.  Have new trial lenses in for a few hours at the time of the appointment.

## 2018-12-07 ENCOUNTER — OFFICE VISIT (OUTPATIENT)
Dept: OPTOMETRY | Facility: CLINIC | Age: 73
End: 2018-12-07

## 2018-12-07 DIAGNOSIS — Z46.0 ENCOUNTER FOR FITTING OR ADJUSTMENT OF SPECTACLES OR CONTACT LENSES: Primary | ICD-10-CM

## 2018-12-07 PROCEDURE — 99499 UNLISTED E&M SERVICE: CPT | Mod: ,,, | Performed by: OPTOMETRIST

## 2018-12-07 PROCEDURE — 92310 CONTACT LENS FITTING OU: CPT | Mod: ,,, | Performed by: OPTOMETRIST

## 2018-12-07 NOTE — PROGRESS NOTES
Assessment /Plan     For exam results, see Encounter Report.    1. Encounter for fitting or adjustment of spectacles or contact lenses                      Good CL fit with present trial lenses:  Biofinity Toric in right eye for distance, and Biofinity Spherical lens in the left eye for near.  Wearing CLs well in each eye.  Showing need for power change in each CL, per over-refraction.  Dispensed new trial Biofinity SCL for use in OS (for near)  8.6   14.0  +3.25.  Continue to wear present right trial lens until new right trial lens arrives.    Have CL department order and dispense new trial lenses:    OD  Biofinity Toric   8.7    14.5   +2.75 -1.25 x 100   Distance    OS Biofinity spherical  8.6   14.0   +3.25   Near     Wear new trial lenses for several days, and then call/email with report on satisfaction with VA at distance and at near with new trial lenses.  If doing well,. Will finalize CL Rx into record, and will send Rx to Mrs. Favret.

## 2018-12-07 NOTE — PATIENT INSTRUCTIONS
Good CL fit with present trial lenses:  Biofinity Toric in right eye for distance, and Biofinity Spherical lens in the left eye for near.  Wearing CLs well in each eye.  Showing need for power change in each CL, per over-refraction.  Dispensed new trial Biofinity SCL for use in OS (for near)  8.6   14.0  +3.25.  Continue to wear present right trial lens until new right trial lens arrives.    Have CL department order and dispense new trial lenses:    OD  Biofinity Toric   8.7    14.5   +2.75 -1.25 x 100   Distance    OS Biofinity spherical  8.6   14.0   +3.25   Near     Wear new trial lenses for several days, and then call/email with report on satisfaction with VA at distance and at near with new trial lenses.  If doing well,. Will finalize CL Rx into record, and will send Rx to Mrs. Favret.

## 2018-12-30 ENCOUNTER — PATIENT MESSAGE (OUTPATIENT)
Dept: INTERNAL MEDICINE | Facility: CLINIC | Age: 73
End: 2018-12-30

## 2018-12-30 ENCOUNTER — OFFICE VISIT (OUTPATIENT)
Dept: URGENT CARE | Facility: CLINIC | Age: 73
End: 2018-12-30
Payer: MEDICARE

## 2018-12-30 VITALS
BODY MASS INDEX: 19.15 KG/M2 | RESPIRATION RATE: 18 BRPM | TEMPERATURE: 98 F | DIASTOLIC BLOOD PRESSURE: 71 MMHG | HEIGHT: 67 IN | OXYGEN SATURATION: 98 % | WEIGHT: 122 LBS | HEART RATE: 78 BPM | SYSTOLIC BLOOD PRESSURE: 139 MMHG

## 2018-12-30 DIAGNOSIS — R11.0 NAUSEA: Primary | ICD-10-CM

## 2018-12-30 PROCEDURE — 3078F DIAST BP <80 MM HG: CPT | Mod: CPTII,S$GLB,, | Performed by: PHYSICIAN ASSISTANT

## 2018-12-30 PROCEDURE — 1101F PT FALLS ASSESS-DOCD LE1/YR: CPT | Mod: CPTII,S$GLB,, | Performed by: PHYSICIAN ASSISTANT

## 2018-12-30 PROCEDURE — 3075F SYST BP GE 130 - 139MM HG: CPT | Mod: CPTII,S$GLB,, | Performed by: PHYSICIAN ASSISTANT

## 2018-12-30 PROCEDURE — 99214 OFFICE O/P EST MOD 30 MIN: CPT | Mod: S$GLB,,, | Performed by: PHYSICIAN ASSISTANT

## 2018-12-30 RX ORDER — ONDANSETRON 4 MG/1
4 TABLET, ORALLY DISINTEGRATING ORAL EVERY 6 HOURS PRN
Qty: 20 TABLET | Refills: 0 | Status: SHIPPED | OUTPATIENT
Start: 2018-12-30 | End: 2019-05-10

## 2018-12-30 NOTE — PATIENT INSTRUCTIONS
"- Rest.    - Drink plenty of fluids.    - Tylenol or Ibuprofen as directed as needed for fever/pain.    - Follow up with your PCP or specialty clinic as directed in the next 1-2 weeks if not improved or as needed.  You can call [phone] to schedule an appointment with the appropriate provider.    - Go to the ED if your symptoms worsen.  - You must understand that you have received an Urgent Care treatment only and that you may be released before all of your medical problems are known or treated.   - You, the patient, will arrange for follow up care as instructed.   - If your condition worsens or fails to improve we recommend that you receive another evaluation at the ER immediately or contact your PCP to discuss your concerns or return here.       Vomiting (Adult)  Vomiting is a common symptom that may be due to different causes. These include gastroenteritis ("stomach flu"), food poisoning and gastritis. There are other more serious causes of vomiting which may be hard to diagnose early in the illness. Therefore, it is important to watch for the warning signs listed below.  The main danger from repeated vomiting is dehydration. This is due to excess loss of water and minerals from the body. When this occurs, body fluids must be replaced.  Home care  · If symptoms are severe, rest at home for the next 24 hours.  · Because your symptoms may be from an infection, wash your hands frequently and well, and use alcohol-based  to avoid spreading the infection to others.  · Wash your hands for at least 20 seconds. Hum the happy birthday song twice for the correct length of time.  · Wash your hands after using the toilet, before and after preparing food, before eating food, after changing a diaper, cleaning a wound, caring for a sick person, and blowing your nose, coughing, or sneezing. You should also wash your hands after caring for someone who is sick, touching pet food, or treats, and touching an animal, " or animal waste.  · You may use acetaminophen or NSAID medicines like ibuprofen or naproxen to control fever, unless another medicine was prescribed. If you have chronic liver or kidney disease or ever had a stomach ulcer or GI bleeding, talk with your doctor before using these medicines. Aspirin should never be used in anyone under 18 years of age who is ill with a fever. It may cause severe liver damage. Don't use NSAID medicines if you are already taking one for another condition (like arthritis) or are on aspirin (such as for heart disease, or after a stroke)  · Avoid tobacco and alcohol use, which may worsen your symptoms.  · If medicines for vomiting were prescribed, take as directed.  · Once vomiting stops, then follow these guidelines:  During the first 12 to 24 hours follow the diet below:  · Fruit juices. Apple, grape juice, clear fruit drinks, and electrolyte replacement drinks.  · Beverages. Soft drinks without caffeine; mineral water (plain or flavored), decaffeinated tea and coffee.  · Soups. Clear broth, consommé and bouillon  · Desserts. Plain gelatin, popsicles and fruit juice bars. As you feel better, you may add 6-8 ounces of yogurt per day.  During the next 24 hours you may add the following to the above:  · Hot cereal, plain toast, bread, rolls, crackers  · Plain noodles, rice, mashed potatoes, chicken noodle or rice soup  · Unsweetened canned fruit (avoid pineapple), bananas  · Limit caffeine and chocolate. No spices or seasonings except salt.  During the next 24 hours:  Gradually resume a normal diet, as you feel better and your symptoms lessen.  Follow-up care  Follow up with your healthcare provider, or as advised.  When to seek medical advice  Call your healthcare provider right away if any of these occur:  · Constant right-sided lower abdominal pain or increasing general abdominal pain  · Continued vomiting (unable to keep liquids down) for 24 hours  · Frequent diarrhea (more than 5 times  a day); blood (red or black color) or mucus in diarrhea  · Reduced urine output or extreme thirst  · Weakness, dizziness or fainting  · Unusually drowsy or confused  · Fever of 100.4°F (38°C) oral or higher, or as directed  · Yellow color of the eyes or skin  Date Last Reviewed: 11/16/2015  © 0889-3915 [employer]. [address] [place] [address]. All rights reserved. This information is not intended as a substitute for professional medical care. Always follow your healthcare professional's instructions.

## 2018-12-30 NOTE — PROGRESS NOTES
"Subjective:       Patient ID: Jacqueline O Favret is a 73 y.o. female.    Vitals:  height is 5' 7" (1.702 m) and weight is 55.3 kg (122 lb). Her tympanic temperature is 97.8 °F (36.6 °C). Her blood pressure is 139/71 and her pulse is 78. Her respiration is 18 and oxygen saturation is 98%.     Chief Complaint: Nausea    This is a 73 y.o. female who presents today with a chief complaint of nausea for 4 days.  She drank Pedialyte and water. She has a post nasal drip. She is taking an otc nausea medication with minimal relief. Denies new medication.  She denies chest pain, shortness of breath, dysuria, abdominal pain, muscle spasms, vomiting, or diarrhea.      Nausea   This is a new problem. The current episode started in the past 7 days. The problem occurs intermittently. The problem has been unchanged. Associated symptoms include abdominal pain, congestion, headaches and nausea. Pertinent negatives include no chest pain, chills, diaphoresis, fever, joint swelling, myalgias or vomiting. The symptoms are aggravated by eating. She has tried drinking and rest for the symptoms. The treatment provided no relief.       Constitution: Positive for appetite change and generalized weakness. Negative for activity change, chills, sweating and fever.   HENT: Positive for congestion. Negative for trouble swallowing.    Cardiovascular: Negative for chest pain.   Eyes: Negative for eye trauma.   Respiratory: Negative for shortness of breath.    Gastrointestinal: Positive for abdominal pain and nausea. Negative for abdominal trauma, abdominal bloating, history of abdominal surgery, vomiting, constipation, diarrhea, dark colored stools and heartburn.   Genitourinary: Negative for dysuria, missed menses and pelvic pain.   Musculoskeletal: Negative for joint swelling, back pain and muscle ache.   Skin: Negative for color change and erythema.   Allergic/Immunologic: Positive for flu shot. Negative for environmental allergies, seasonal " allergies and food allergies.   Neurological: Positive for headaches.       Objective:      Physical Exam   Constitutional: She is oriented to person, place, and time. She appears well-developed and well-nourished.   HENT:   Head: Normocephalic and atraumatic.   Right Ear: Hearing, tympanic membrane, external ear and ear canal normal.   Left Ear: Hearing, tympanic membrane, external ear and ear canal normal.   Nose: Nose normal.   Mouth/Throat: Uvula is midline and oropharynx is clear and moist.   Eyes: Conjunctivae are normal.   Neck: Normal range of motion. Neck supple. No thyromegaly present.   Cardiovascular: Normal rate and regular rhythm. Exam reveals no gallop and no friction rub.   No murmur heard.  Pulmonary/Chest: Effort normal and breath sounds normal. She has no wheezes. She has no rales.   Abdominal: Soft. Bowel sounds are normal. There is no tenderness.   Musculoskeletal: Normal range of motion.   Lymphadenopathy:     She has no cervical adenopathy.   Neurological: She is alert and oriented to person, place, and time.   Skin: Skin is warm and dry. No rash noted. No erythema.   Psychiatric: She has a normal mood and affect. Her behavior is normal. Judgment and thought content normal.   Nursing note and vitals reviewed.      Assessment:       1. Nausea        Plan:         Nausea  -     ondansetron (ZOFRAN-ODT) 4 MG TbDL; Take 1 tablet (4 mg total) by mouth every 6 (six) hours as needed (nausea).  Dispense: 20 tablet; Refill: 0        Marli was seen today for nausea.    Diagnoses and all orders for this visit:    Nausea  -     ondansetron (ZOFRAN-ODT) 4 MG TbDL; Take 1 tablet (4 mg total) by mouth every 6 (six) hours as needed (nausea).      Patient Instructions   - Rest.    - Drink plenty of fluids.    - Tylenol or Ibuprofen as directed as needed for fever/pain.    - Follow up with your PCP or specialty clinic as directed in the next 1-2 weeks if not improved or as needed.  You can call [phone]  [phone] to schedule an appointment with the appropriate provider.    - Go to the ED if your symptoms worsen.  - You must understand that you have received an Urgent Care treatment only and that you may be released before all of your medical problems are known or treated.   - You, the patient, will arrange for follow up care as instructed.   - If your condition worsens or fails to improve we recommend that you receive another evaluation at the ER immediately or contact your PCP to discuss your concerns or return here.       Vomiting (Adult)  Vomiting is a common symptom that may be due to different causes. These include gastroenteritis ("stomach flu"), food poisoning and gastritis. There are other more serious causes of vomiting which may be hard to diagnose early in the illness. Therefore, it is important to watch for the warning signs listed below.  The main danger from repeated vomiting is dehydration. This is due to excess loss of water and minerals from the body. When this occurs, body fluids must be replaced.  Home care  · If symptoms are severe, rest at home for the next 24 hours.  · Because your symptoms may be from an infection, wash your hands frequently and well, and use alcohol-based  to avoid spreading the infection to others.  · Wash your hands for at least 20 seconds. Hum the happy birthday song twice for the correct length of time.  · Wash your hands after using the toilet, before and after preparing food, before eating food, after changing a diaper, cleaning a wound, caring for a sick person, and blowing your nose, coughing, or sneezing. You should also wash your hands after caring for someone who is sick, touching pet food, or treats, and touching an animal, or animal waste.  · You may use acetaminophen or NSAID medicines like ibuprofen or naproxen to control fever, unless another medicine was prescribed. If you have chronic liver or kidney disease or ever had a stomach ulcer or GI " bleeding, talk with your doctor before using these medicines. Aspirin should never be used in anyone under 18 years of age who is ill with a fever. It may cause severe liver damage. Don't use NSAID medicines if you are already taking one for another condition (like arthritis) or are on aspirin (such as for heart disease, or after a stroke)  · Avoid tobacco and alcohol use, which may worsen your symptoms.  · If medicines for vomiting were prescribed, take as directed.  · Once vomiting stops, then follow these guidelines:  During the first 12 to 24 hours follow the diet below:  · Fruit juices. Apple, grape juice, clear fruit drinks, and electrolyte replacement drinks.  · Beverages. Soft drinks without caffeine; mineral water (plain or flavored), decaffeinated tea and coffee.  · Soups. Clear broth, consommé and bouillon  · Desserts. Plain gelatin, popsicles and fruit juice bars. As you feel better, you may add 6-8 ounces of yogurt per day.  During the next 24 hours you may add the following to the above:  · Hot cereal, plain toast, bread, rolls, crackers  · Plain noodles, rice, mashed potatoes, chicken noodle or rice soup  · Unsweetened canned fruit (avoid pineapple), bananas  · Limit caffeine and chocolate. No spices or seasonings except salt.  During the next 24 hours:  Gradually resume a normal diet, as you feel better and your symptoms lessen.  Follow-up care  Follow up with your healthcare provider, or as advised.  When to seek medical advice  Call your healthcare provider right away if any of these occur:  · Constant right-sided lower abdominal pain or increasing general abdominal pain  · Continued vomiting (unable to keep liquids down) for 24 hours  · Frequent diarrhea (more than 5 times a day); blood (red or black color) or mucus in diarrhea  · Reduced urine output or extreme thirst  · Weakness, dizziness or fainting  · Unusually drowsy or confused  · Fever of 100.4°F (38°C) oral or higher, or as  directed  · Yellow color of the eyes or skin  Date Last Reviewed: 11/16/2015  © 3762-6398 [employer]. [address] [place] [address], [place] [address]. All rights reserved. This information is not intended as a substitute for professional medical care. Always follow your healthcare professional's instructions.

## 2018-12-31 ENCOUNTER — TELEPHONE (OUTPATIENT)
Dept: INTERNAL MEDICINE | Facility: CLINIC | Age: 73
End: 2018-12-31

## 2018-12-31 ENCOUNTER — LAB VISIT (OUTPATIENT)
Dept: LAB | Facility: HOSPITAL | Age: 73
End: 2018-12-31
Payer: MEDICARE

## 2018-12-31 ENCOUNTER — OFFICE VISIT (OUTPATIENT)
Dept: INTERNAL MEDICINE | Facility: CLINIC | Age: 73
End: 2018-12-31
Payer: MEDICARE

## 2018-12-31 VITALS
SYSTOLIC BLOOD PRESSURE: 138 MMHG | OXYGEN SATURATION: 97 % | TEMPERATURE: 98 F | DIASTOLIC BLOOD PRESSURE: 64 MMHG | HEIGHT: 67 IN | WEIGHT: 127.19 LBS | HEART RATE: 78 BPM | BODY MASS INDEX: 19.96 KG/M2

## 2018-12-31 DIAGNOSIS — R11.0 NAUSEA: Primary | ICD-10-CM

## 2018-12-31 DIAGNOSIS — R94.4 DECREASED GFR: ICD-10-CM

## 2018-12-31 DIAGNOSIS — E87.6 HYPOKALEMIA: ICD-10-CM

## 2018-12-31 DIAGNOSIS — R11.0 NAUSEA: ICD-10-CM

## 2018-12-31 DIAGNOSIS — R79.89 ELEVATED LFTS: Primary | ICD-10-CM

## 2018-12-31 LAB
ALBUMIN SERPL BCP-MCNC: 4 G/DL
ALP SERPL-CCNC: 76 U/L
ALT SERPL W/O P-5'-P-CCNC: 62 U/L
ANION GAP SERPL CALC-SCNC: 14 MMOL/L
AST SERPL-CCNC: 102 U/L
BASOPHILS # BLD AUTO: 0.01 K/UL
BASOPHILS NFR BLD: 0.2 %
BILIRUB SERPL-MCNC: 0.9 MG/DL
BUN SERPL-MCNC: 10 MG/DL
CALCIUM SERPL-MCNC: 10.2 MG/DL
CHLORIDE SERPL-SCNC: 93 MMOL/L
CO2 SERPL-SCNC: 37 MMOL/L
CREAT SERPL-MCNC: 1.3 MG/DL
DIFFERENTIAL METHOD: ABNORMAL
EOSINOPHIL # BLD AUTO: 0.1 K/UL
EOSINOPHIL NFR BLD: 1.4 %
ERYTHROCYTE [DISTWIDTH] IN BLOOD BY AUTOMATED COUNT: 12.9 %
EST. GFR  (AFRICAN AMERICAN): 47 ML/MIN/1.73 M^2
EST. GFR  (NON AFRICAN AMERICAN): 41 ML/MIN/1.73 M^2
GLUCOSE SERPL-MCNC: 104 MG/DL
HCT VFR BLD AUTO: 41.6 %
HGB BLD-MCNC: 13.5 G/DL
LYMPHOCYTES # BLD AUTO: 1.7 K/UL
LYMPHOCYTES NFR BLD: 26.5 %
MCH RBC QN AUTO: 34 PG
MCHC RBC AUTO-ENTMCNC: 32.5 G/DL
MCV RBC AUTO: 105 FL
MONOCYTES # BLD AUTO: 0.7 K/UL
MONOCYTES NFR BLD: 11.9 %
NEUTROPHILS # BLD AUTO: 3.7 K/UL
NEUTROPHILS NFR BLD: 59.8 %
PLATELET # BLD AUTO: 139 K/UL
PMV BLD AUTO: 9.2 FL
POTASSIUM SERPL-SCNC: 3 MMOL/L
PROT SERPL-MCNC: 7.1 G/DL
RBC # BLD AUTO: 3.97 M/UL
SODIUM SERPL-SCNC: 144 MMOL/L
WBC # BLD AUTO: 6.23 K/UL

## 2018-12-31 PROCEDURE — 99999 PR PBB SHADOW E&M-EST. PATIENT-LVL V: CPT | Mod: PBBFAC,,, | Performed by: NURSE PRACTITIONER

## 2018-12-31 PROCEDURE — 85025 COMPLETE CBC W/AUTO DIFF WBC: CPT

## 2018-12-31 PROCEDURE — 99213 OFFICE O/P EST LOW 20 MIN: CPT | Mod: S$GLB,,, | Performed by: NURSE PRACTITIONER

## 2018-12-31 PROCEDURE — 1101F PT FALLS ASSESS-DOCD LE1/YR: CPT | Mod: CPTII,S$GLB,, | Performed by: NURSE PRACTITIONER

## 2018-12-31 PROCEDURE — 3075F SYST BP GE 130 - 139MM HG: CPT | Mod: CPTII,S$GLB,, | Performed by: NURSE PRACTITIONER

## 2018-12-31 PROCEDURE — 80053 COMPREHEN METABOLIC PANEL: CPT

## 2018-12-31 PROCEDURE — 36415 COLL VENOUS BLD VENIPUNCTURE: CPT

## 2018-12-31 PROCEDURE — 3078F DIAST BP <80 MM HG: CPT | Mod: CPTII,S$GLB,, | Performed by: NURSE PRACTITIONER

## 2018-12-31 NOTE — TELEPHONE ENCOUNTER
Restart potassium chloride supplement. Increase PO fluid intake. Repeat CMP in 1 week. Will get liver u/s if still elevated.

## 2018-12-31 NOTE — PROGRESS NOTES
Subjective:       Patient ID: Jacqueline O Favret is a 73 y.o. female.    Chief Complaint: Nausea    Ms. Favret presents today for follow up from an urgent care visit yesterday. She went to the urgent care clinic for nausea x 4 days that was progressively worsening. She was prescribed zofran. She took 3 doses yesterday and awoke feeling mostly back to normal today. She has not needed zofran today. She is concerned, however, because she has had issues with her potassium levels recently and she wants to make sure the recent bouts of nausea could not be related to an electrolyte imbalance.       Review of Systems   Constitutional: Negative for fever.   HENT: Negative for facial swelling.    Eyes: Negative for visual disturbance.   Respiratory: Negative for shortness of breath.    Cardiovascular: Negative for chest pain.   Gastrointestinal: Positive for nausea. Negative for constipation, diarrhea and vomiting.   Genitourinary: Negative for dysuria.   Musculoskeletal: Negative for gait problem.   Skin: Negative for rash.   Neurological: Negative for headaches.   Psychiatric/Behavioral: Negative for confusion.       Objective:      Physical Exam   Constitutional: She is oriented to person, place, and time. She appears well-developed and well-nourished. No distress.   HENT:   Head: Normocephalic and atraumatic.   Eyes: No scleral icterus.   Neck: Normal range of motion.   Cardiovascular: Normal rate, regular rhythm and normal heart sounds.   Pulmonary/Chest: Effort normal and breath sounds normal. No stridor. No respiratory distress. She has no wheezes. She has no rales.   Abdominal: Soft. Bowel sounds are normal. She exhibits no distension and no mass. There is no tenderness. There is no rebound and no guarding.   Musculoskeletal: She exhibits no edema.   Neurological: She is alert and oriented to person, place, and time.   Skin: Skin is warm and dry. She is not diaphoretic.   Psychiatric: She has a normal mood and  affect. Her behavior is normal.   Nursing note and vitals reviewed.      Assessment:       1. Nausea        Plan:   1. Nausea  - Comprehensive metabolic panel; Future  - CBC auto differential; Future  - URINALYSIS; Future      Pt has been given instructions populated from [facility] and has verbalized understanding of the after visit summary and information contained wherein.    Follow up with a primary care provider. May go to ER for acute shortness of breath, lightheadedness, fever, or any other emergent complaints or changes in condition.

## 2019-01-01 ENCOUNTER — PATIENT MESSAGE (OUTPATIENT)
Dept: INTERNAL MEDICINE | Facility: CLINIC | Age: 74
End: 2019-01-01

## 2019-01-02 ENCOUNTER — PATIENT MESSAGE (OUTPATIENT)
Dept: INTERNAL MEDICINE | Facility: CLINIC | Age: 74
End: 2019-01-02

## 2019-01-03 ENCOUNTER — PATIENT MESSAGE (OUTPATIENT)
Dept: DERMATOLOGY | Facility: CLINIC | Age: 74
End: 2019-01-03

## 2019-01-04 ENCOUNTER — TELEPHONE (OUTPATIENT)
Dept: OPHTHALMOLOGY | Facility: CLINIC | Age: 74
End: 2019-01-04

## 2019-01-04 ENCOUNTER — PATIENT MESSAGE (OUTPATIENT)
Dept: INTERNAL MEDICINE | Facility: CLINIC | Age: 74
End: 2019-01-04

## 2019-01-04 ENCOUNTER — DOCUMENTATION ONLY (OUTPATIENT)
Dept: OPTOMETRY | Facility: CLINIC | Age: 74
End: 2019-01-04

## 2019-01-07 ENCOUNTER — PATIENT MESSAGE (OUTPATIENT)
Dept: DERMATOLOGY | Facility: CLINIC | Age: 74
End: 2019-01-07

## 2019-01-07 ENCOUNTER — TELEPHONE (OUTPATIENT)
Dept: INTERNAL MEDICINE | Facility: CLINIC | Age: 74
End: 2019-01-07

## 2019-01-07 DIAGNOSIS — L65.9 HAIR THINNING: Primary | ICD-10-CM

## 2019-01-07 RX ORDER — KETOCONAZOLE 20 MG/ML
SHAMPOO, SUSPENSION TOPICAL
Qty: 120 ML | Refills: 5 | Status: SHIPPED | OUTPATIENT
Start: 2019-01-07 | End: 2019-05-10

## 2019-01-07 NOTE — TELEPHONE ENCOUNTER
----- Message from Olya Preston sent at 1/7/2019  4:34 PM CST -----  Contact: self [phone]  Patient is calling to speak with Alicia regarding the lab apt she's scheduled to have tomorrow, has some questions about the test. Please call her back and advise    Thank you

## 2019-01-08 ENCOUNTER — LAB VISIT (OUTPATIENT)
Dept: LAB | Facility: HOSPITAL | Age: 74
End: 2019-01-08
Attending: NURSE PRACTITIONER
Payer: MEDICARE

## 2019-01-08 DIAGNOSIS — E87.6 HYPOKALEMIA: ICD-10-CM

## 2019-01-08 DIAGNOSIS — R79.89 ELEVATED LFTS: ICD-10-CM

## 2019-01-08 DIAGNOSIS — L65.9 HAIR THINNING: ICD-10-CM

## 2019-01-08 DIAGNOSIS — R94.4 DECREASED GFR: ICD-10-CM

## 2019-01-08 LAB
ALBUMIN SERPL BCP-MCNC: 3.4 G/DL
ALP SERPL-CCNC: 57 U/L
ALT SERPL W/O P-5'-P-CCNC: 22 U/L
ANION GAP SERPL CALC-SCNC: 8 MMOL/L
AST SERPL-CCNC: 30 U/L
BILIRUB SERPL-MCNC: 0.8 MG/DL
BUN SERPL-MCNC: 9 MG/DL
CALCIUM SERPL-MCNC: 9.3 MG/DL
CHLORIDE SERPL-SCNC: 96 MMOL/L
CO2 SERPL-SCNC: 35 MMOL/L
CREAT SERPL-MCNC: 1 MG/DL
EST. GFR  (AFRICAN AMERICAN): >60 ML/MIN/1.73 M^2
EST. GFR  (NON AFRICAN AMERICAN): 56 ML/MIN/1.73 M^2
GLUCOSE SERPL-MCNC: 97 MG/DL
POTASSIUM SERPL-SCNC: 2.9 MMOL/L
PROT SERPL-MCNC: 6.2 G/DL
SODIUM SERPL-SCNC: 139 MMOL/L
TSH SERPL DL<=0.005 MIU/L-ACNC: 1.59 UIU/ML

## 2019-01-08 PROCEDURE — 36415 COLL VENOUS BLD VENIPUNCTURE: CPT

## 2019-01-08 PROCEDURE — 84443 ASSAY THYROID STIM HORMONE: CPT

## 2019-01-08 PROCEDURE — 80053 COMPREHEN METABOLIC PANEL: CPT

## 2019-01-12 RX ORDER — ROSUVASTATIN CALCIUM 20 MG/1
TABLET, COATED ORAL
Qty: 90 TABLET | Refills: 1 | Status: SHIPPED | OUTPATIENT
Start: 2019-01-12 | End: 2019-07-10 | Stop reason: SDUPTHER

## 2019-01-28 ENCOUNTER — TELEPHONE (OUTPATIENT)
Dept: INTERNAL MEDICINE | Facility: CLINIC | Age: 74
End: 2019-01-28

## 2019-01-28 NOTE — TELEPHONE ENCOUNTER
Left a message for the patient to call the office back.   To confirm rescheduled appointment to 4:20pm, at Primary care. Rescheduled from pcp follow up      Please Advise  Thank You

## 2019-01-28 NOTE — TELEPHONE ENCOUNTER
Left a message letting patient know pcp had a family emergency and may be out the rest of the week.

## 2019-01-28 NOTE — TELEPHONE ENCOUNTER
----- Message from Carolyn Levin sent at 1/28/2019 10:50 AM CST -----  Contact: Pt Mobile/Home [phone]   Patient would like a call back in regards to her saying that she received a call telling her that the doctor will not be in on today. She said that she would like to be seen as soon as possible in regards to her potassium levels. She would like a call back to speak with you about her being seen sooner than 02/01/2019.

## 2019-02-01 ENCOUNTER — PATIENT MESSAGE (OUTPATIENT)
Dept: ORTHOPEDICS | Facility: CLINIC | Age: 74
End: 2019-02-01

## 2019-02-01 ENCOUNTER — TELEPHONE (OUTPATIENT)
Dept: ORTHOPEDICS | Facility: CLINIC | Age: 74
End: 2019-02-01

## 2019-02-01 NOTE — TELEPHONE ENCOUNTER
----- Message from Mireille Rodriguez MA sent at 2/1/2019  1:36 PM CST -----  Contact: Self/ [phone]      ----- Message -----  From: Varsha West  Sent: 2/1/2019   1:20 PM  To: Jean-Paul Nassar Staff    Patient was calling the office back from a missed phone call.

## 2019-02-01 NOTE — TELEPHONE ENCOUNTER
"Ortho Telephone Triage Message  [phone]  Patient C/O: R ankle pain s/p "severely" twisting this morning. Pt c/o bruising and some swelling. Pt states able to bear weight with "soreness".  Pt declines going to Urgent Care. Would like Primary Care appt tomorrow, if available.   Triage Advice: Rest, ice, elevate. May wrap with ace bandage for support being sure to avoid constricting circulation to toes.  Resolution:Pt states understanding. Appt scheduled tomorrow with Dr. Rivera/[facility] at 10:20am. Pt confirms time and location of appt. Pt has [facility] contact number for questions/concerns, in interim.  "

## 2019-02-02 ENCOUNTER — HOSPITAL ENCOUNTER (OUTPATIENT)
Dept: RADIOLOGY | Facility: HOSPITAL | Age: 74
Discharge: HOME | End: 2019-02-02
Attending: INTERNAL MEDICINE
Payer: MEDICARE

## 2019-02-02 ENCOUNTER — OFFICE VISIT (OUTPATIENT)
Dept: INTERNAL MEDICINE | Facility: CLINIC | Age: 74
End: 2019-02-02
Payer: MEDICARE

## 2019-02-02 VITALS
BODY MASS INDEX: 19.92 KG/M2 | TEMPERATURE: 97 F | OXYGEN SATURATION: 99 % | SYSTOLIC BLOOD PRESSURE: 142 MMHG | HEIGHT: 67 IN | HEART RATE: 71 BPM | DIASTOLIC BLOOD PRESSURE: 78 MMHG

## 2019-02-02 DIAGNOSIS — S93.401A SPRAIN AND STRAIN OF RIGHT ANKLE: ICD-10-CM

## 2019-02-02 DIAGNOSIS — S96.911A SPRAIN AND STRAIN OF RIGHT ANKLE: Primary | ICD-10-CM

## 2019-02-02 DIAGNOSIS — S96.911A SPRAIN AND STRAIN OF RIGHT ANKLE: ICD-10-CM

## 2019-02-02 DIAGNOSIS — S93.401A SPRAIN AND STRAIN OF RIGHT ANKLE: Primary | ICD-10-CM

## 2019-02-02 PROCEDURE — 1101F PT FALLS ASSESS-DOCD LE1/YR: CPT | Mod: CPTII,S$GLB,, | Performed by: INTERNAL MEDICINE

## 2019-02-02 PROCEDURE — 3078F DIAST BP <80 MM HG: CPT | Mod: CPTII,S$GLB,, | Performed by: INTERNAL MEDICINE

## 2019-02-02 PROCEDURE — 73630 XR FOOT COMPLETE 3 VIEW RIGHT: ICD-10-PCS | Mod: 26,RT,, | Performed by: RADIOLOGY

## 2019-02-02 PROCEDURE — 73630 X-RAY EXAM OF FOOT: CPT | Mod: 26,RT,, | Performed by: RADIOLOGY

## 2019-02-02 PROCEDURE — 73610 X-RAY EXAM OF ANKLE: CPT | Mod: TC,RT

## 2019-02-02 PROCEDURE — 73610 XR ANKLE COMPLETE 3 VIEW RIGHT: ICD-10-PCS | Mod: 26,RT,, | Performed by: RADIOLOGY

## 2019-02-02 PROCEDURE — 99999 PR PBB SHADOW E&M-EST. PATIENT-LVL III: ICD-10-PCS | Mod: PBBFAC,,, | Performed by: INTERNAL MEDICINE

## 2019-02-02 PROCEDURE — 3077F PR MOST RECENT SYSTOLIC BLOOD PRESSURE >= 140 MM HG: ICD-10-PCS | Mod: CPTII,S$GLB,, | Performed by: INTERNAL MEDICINE

## 2019-02-02 PROCEDURE — 73610 X-RAY EXAM OF ANKLE: CPT | Mod: 26,RT,, | Performed by: RADIOLOGY

## 2019-02-02 PROCEDURE — 99213 PR OFFICE/OUTPT VISIT, EST, LEVL III, 20-29 MIN: ICD-10-PCS | Mod: S$GLB,,, | Performed by: INTERNAL MEDICINE

## 2019-02-02 PROCEDURE — 1101F PR PT FALLS ASSESS DOC 0-1 FALLS W/OUT INJ PAST YR: ICD-10-PCS | Mod: CPTII,S$GLB,, | Performed by: INTERNAL MEDICINE

## 2019-02-02 PROCEDURE — 3077F SYST BP >= 140 MM HG: CPT | Mod: CPTII,S$GLB,, | Performed by: INTERNAL MEDICINE

## 2019-02-02 PROCEDURE — 99999 PR PBB SHADOW E&M-EST. PATIENT-LVL III: CPT | Mod: PBBFAC,,, | Performed by: INTERNAL MEDICINE

## 2019-02-02 PROCEDURE — 73630 X-RAY EXAM OF FOOT: CPT | Mod: TC,RT

## 2019-02-02 PROCEDURE — 99213 OFFICE O/P EST LOW 20 MIN: CPT | Mod: S$GLB,,, | Performed by: INTERNAL MEDICINE

## 2019-02-02 PROCEDURE — 3078F PR MOST RECENT DIASTOLIC BLOOD PRESSURE < 80 MM HG: ICD-10-PCS | Mod: CPTII,S$GLB,, | Performed by: INTERNAL MEDICINE

## 2019-02-02 NOTE — PROGRESS NOTES
Subjective:       Patient ID: Jacqueline O Favret is a 73 y.o. female.    Chief Complaint: Ankle Injury (right ankle)    Patient reports slipping on rug at home and inverting right ankle/ foot yesterday morning.  Has kept foot elevated and iced but it is very swollen and ecchymotic.  Is able to walk with a cane      Review of Systems   Constitutional: Negative for activity change, chills, fatigue and fever.   HENT: Negative for congestion, ear pain, nosebleeds, postnasal drip, sinus pressure and sore throat.    Eyes: Negative.  Negative for visual disturbance.   Respiratory: Negative for cough, chest tightness, shortness of breath and wheezing.    Cardiovascular: Negative for chest pain.   Gastrointestinal: Negative for abdominal pain, diarrhea, nausea and vomiting.   Genitourinary: Negative for difficulty urinating, dysuria, frequency and urgency.   Musculoskeletal: Negative for arthralgias and neck stiffness.   Skin: Negative for rash.   Neurological: Negative for dizziness, weakness and headaches.   Psychiatric/Behavioral: Negative for sleep disturbance. The patient is not nervous/anxious.        Objective:      Physical Exam   Musculoskeletal:        Right ankle: She exhibits decreased range of motion, swelling and ecchymosis. She exhibits no deformity, no laceration and normal pulse. Tenderness. Lateral malleolus tenderness found. Achilles tendon normal.        Right foot: There is decreased range of motion, tenderness, bony tenderness and swelling. There is normal capillary refill, no crepitus, no deformity and no laceration.       Assessment:       1. Sprain and strain of right ankle        Plan:   Marli was seen today for ankle injury.    Diagnoses and all orders for this visit:    Sprain and strain of right ankle  -     X-Ray Ankle Complete Right; Future  -     X-Ray Foot Complete Right; Future

## 2019-02-04 ENCOUNTER — OFFICE VISIT (OUTPATIENT)
Dept: ORTHOPEDICS | Facility: CLINIC | Age: 74
End: 2019-02-04
Payer: MEDICARE

## 2019-02-04 ENCOUNTER — TELEPHONE (OUTPATIENT)
Dept: INTERNAL MEDICINE | Facility: CLINIC | Age: 74
End: 2019-02-04

## 2019-02-04 ENCOUNTER — PATIENT MESSAGE (OUTPATIENT)
Dept: ORTHOPEDICS | Facility: CLINIC | Age: 74
End: 2019-02-04

## 2019-02-04 VITALS
SYSTOLIC BLOOD PRESSURE: 148 MMHG | WEIGHT: 127.19 LBS | BODY MASS INDEX: 19.92 KG/M2 | HEART RATE: 94 BPM | DIASTOLIC BLOOD PRESSURE: 78 MMHG

## 2019-02-04 DIAGNOSIS — S92.014A CLOSED NONDISPLACED FRACTURE OF BODY OF RIGHT CALCANEUS, INITIAL ENCOUNTER: Primary | ICD-10-CM

## 2019-02-04 DIAGNOSIS — S82.899A CLOSED FRACTURE OF ANKLE, UNSPECIFIED LATERALITY, INITIAL ENCOUNTER: Primary | ICD-10-CM

## 2019-02-04 PROCEDURE — 3077F SYST BP >= 140 MM HG: CPT | Mod: CPTII,S$GLB,, | Performed by: PHYSICIAN ASSISTANT

## 2019-02-04 PROCEDURE — 99999 PR PBB SHADOW E&M-EST. PATIENT-LVL III: ICD-10-PCS | Mod: PBBFAC,,, | Performed by: PHYSICIAN ASSISTANT

## 2019-02-04 PROCEDURE — 3077F PR MOST RECENT SYSTOLIC BLOOD PRESSURE >= 140 MM HG: ICD-10-PCS | Mod: CPTII,S$GLB,, | Performed by: PHYSICIAN ASSISTANT

## 2019-02-04 PROCEDURE — 3078F DIAST BP <80 MM HG: CPT | Mod: CPTII,S$GLB,, | Performed by: PHYSICIAN ASSISTANT

## 2019-02-04 PROCEDURE — 3078F PR MOST RECENT DIASTOLIC BLOOD PRESSURE < 80 MM HG: ICD-10-PCS | Mod: CPTII,S$GLB,, | Performed by: PHYSICIAN ASSISTANT

## 2019-02-04 PROCEDURE — 99999 PR PBB SHADOW E&M-EST. PATIENT-LVL III: CPT | Mod: PBBFAC,,, | Performed by: PHYSICIAN ASSISTANT

## 2019-02-04 PROCEDURE — 99214 PR OFFICE/OUTPT VISIT, EST, LEVL IV, 30-39 MIN: ICD-10-PCS | Mod: S$GLB,,, | Performed by: PHYSICIAN ASSISTANT

## 2019-02-04 PROCEDURE — 1101F PR PT FALLS ASSESS DOC 0-1 FALLS W/OUT INJ PAST YR: ICD-10-PCS | Mod: CPTII,S$GLB,, | Performed by: PHYSICIAN ASSISTANT

## 2019-02-04 PROCEDURE — 1101F PT FALLS ASSESS-DOCD LE1/YR: CPT | Mod: CPTII,S$GLB,, | Performed by: PHYSICIAN ASSISTANT

## 2019-02-04 PROCEDURE — 99214 OFFICE O/P EST MOD 30 MIN: CPT | Mod: S$GLB,,, | Performed by: PHYSICIAN ASSISTANT

## 2019-02-04 RX ORDER — RAMIPRIL 10 MG/1
10 CAPSULE ORAL 2 TIMES DAILY
Qty: 180 CAPSULE | Refills: 0 | Status: ON HOLD | OUTPATIENT
Start: 2019-02-04 | End: 2019-04-06 | Stop reason: HOSPADM

## 2019-02-04 NOTE — TELEPHONE ENCOUNTER
Spoke to patient, results given per NP instruction, pt expressed verbal understanding, Help pt set up an appt with ortho  02/04/2019 at 0615 PM no additional questions at this time.    Deena RIOS

## 2019-02-04 NOTE — LETTER
February 4, 2019      Alicia Nassar, NP  [address]  [facility] [address]           [facility]  [address]  [facility] [address]  Phone: [phone]  Fax: [phone]          Patient: Jacqueline O Favret   MR Number: [id number]   YOB: 1945   Date of Visit: 2/4/2019       Dear Alicia Nassar:    Thank you for referring Jacqueline Favret to me for evaluation. Attached you will find relevant portions of my assessment and plan of care.    If you have questions, please do not hesitate to call me. I look forward to following Jacqueline Favret along with you.    Sincerely,    Carlos Bassett PA-C    Enclosure  CC:  No Recipients    If you would like to receive this communication electronically, please contact [email or web address] or [phone] to request more information on [facility] access.    For providers and/or their staff who would like to refer a patient to [facility], please contact us through our one-stop-shop provider referral line, [facility], at [phone].    If you feel you have received this communication in error or would no longer like to receive these types of communications, please e-mail [email or web address]

## 2019-02-04 NOTE — TELEPHONE ENCOUNTER
Please call Ms Favret, who saw Dr Rivera on Saturday. Her ankle is broken. She needs to see orthopedics. Consult orders are placed. Please schedule ASAP.

## 2019-02-05 ENCOUNTER — OFFICE VISIT (OUTPATIENT)
Dept: INTERNAL MEDICINE | Facility: CLINIC | Age: 74
End: 2019-02-05
Payer: MEDICARE

## 2019-02-05 ENCOUNTER — LAB VISIT (OUTPATIENT)
Dept: LAB | Facility: HOSPITAL | Age: 74
End: 2019-02-05
Attending: INTERNAL MEDICINE
Payer: MEDICARE

## 2019-02-05 ENCOUNTER — PATIENT MESSAGE (OUTPATIENT)
Dept: INTERNAL MEDICINE | Facility: CLINIC | Age: 74
End: 2019-02-05

## 2019-02-05 VITALS
WEIGHT: 127 LBS | DIASTOLIC BLOOD PRESSURE: 66 MMHG | HEART RATE: 82 BPM | BODY MASS INDEX: 19.93 KG/M2 | SYSTOLIC BLOOD PRESSURE: 126 MMHG | OXYGEN SATURATION: 99 % | HEIGHT: 67 IN

## 2019-02-05 DIAGNOSIS — E87.6 HYPOKALEMIA: ICD-10-CM

## 2019-02-05 DIAGNOSIS — Z12.31 SCREENING MAMMOGRAM, ENCOUNTER FOR: Primary | ICD-10-CM

## 2019-02-05 LAB
ALBUMIN SERPL BCP-MCNC: 3.9 G/DL
ALP SERPL-CCNC: 71 U/L
ALT SERPL W/O P-5'-P-CCNC: 25 U/L
ANION GAP SERPL CALC-SCNC: 10 MMOL/L
AST SERPL-CCNC: 41 U/L
BILIRUB SERPL-MCNC: 1 MG/DL
BUN SERPL-MCNC: 12 MG/DL
CALCIUM SERPL-MCNC: 10.7 MG/DL
CHLORIDE SERPL-SCNC: 97 MMOL/L
CO2 SERPL-SCNC: 30 MMOL/L
CREAT SERPL-MCNC: 1.2 MG/DL
EST. GFR  (AFRICAN AMERICAN): 52.2 ML/MIN/1.73 M^2
EST. GFR  (NON AFRICAN AMERICAN): 45.3 ML/MIN/1.73 M^2
GLUCOSE SERPL-MCNC: 103 MG/DL
MAGNESIUM SERPL-MCNC: 1.6 MG/DL
POTASSIUM SERPL-SCNC: 3.9 MMOL/L
PROT SERPL-MCNC: 7.1 G/DL
SODIUM SERPL-SCNC: 137 MMOL/L

## 2019-02-05 PROCEDURE — 99999 PR PBB SHADOW E&M-EST. PATIENT-LVL V: CPT | Mod: PBBFAC,,, | Performed by: INTERNAL MEDICINE

## 2019-02-05 PROCEDURE — 3074F PR MOST RECENT SYSTOLIC BLOOD PRESSURE < 130 MM HG: ICD-10-PCS | Mod: CPTII,S$GLB,, | Performed by: INTERNAL MEDICINE

## 2019-02-05 PROCEDURE — 80053 COMPREHEN METABOLIC PANEL: CPT

## 2019-02-05 PROCEDURE — 99213 OFFICE O/P EST LOW 20 MIN: CPT | Mod: S$GLB,,, | Performed by: INTERNAL MEDICINE

## 2019-02-05 PROCEDURE — 99213 PR OFFICE/OUTPT VISIT, EST, LEVL III, 20-29 MIN: ICD-10-PCS | Mod: S$GLB,,, | Performed by: INTERNAL MEDICINE

## 2019-02-05 PROCEDURE — 3078F PR MOST RECENT DIASTOLIC BLOOD PRESSURE < 80 MM HG: ICD-10-PCS | Mod: CPTII,S$GLB,, | Performed by: INTERNAL MEDICINE

## 2019-02-05 PROCEDURE — 1101F PT FALLS ASSESS-DOCD LE1/YR: CPT | Mod: CPTII,S$GLB,, | Performed by: INTERNAL MEDICINE

## 2019-02-05 PROCEDURE — 3074F SYST BP LT 130 MM HG: CPT | Mod: CPTII,S$GLB,, | Performed by: INTERNAL MEDICINE

## 2019-02-05 PROCEDURE — 99999 PR PBB SHADOW E&M-EST. PATIENT-LVL V: ICD-10-PCS | Mod: PBBFAC,,, | Performed by: INTERNAL MEDICINE

## 2019-02-05 PROCEDURE — 3078F DIAST BP <80 MM HG: CPT | Mod: CPTII,S$GLB,, | Performed by: INTERNAL MEDICINE

## 2019-02-05 PROCEDURE — 36415 COLL VENOUS BLD VENIPUNCTURE: CPT | Mod: PO

## 2019-02-05 PROCEDURE — 83735 ASSAY OF MAGNESIUM: CPT

## 2019-02-05 PROCEDURE — 1101F PR PT FALLS ASSESS DOC 0-1 FALLS W/OUT INJ PAST YR: ICD-10-PCS | Mod: CPTII,S$GLB,, | Performed by: INTERNAL MEDICINE

## 2019-02-05 RX ORDER — ALPRAZOLAM 0.25 MG/1
TABLET ORAL
Qty: 20 TABLET | Refills: 0 | Status: SHIPPED | OUTPATIENT
Start: 2019-02-05 | End: 2019-05-06 | Stop reason: SDUPTHER

## 2019-02-05 NOTE — PROGRESS NOTES
SUBJECTIVE:     Chief Complaint & History of Present Illness:  Jacqueline O Favret is a  New  patient 73 y.o. female who is seen here today with a complaint of    Chief Complaint   Patient presents with    Right Ankle - Pain    .  She is here today for care and treatment for a fall and resultant avulsion fracture of the right ankle suffered 02/01/2019.  She was seen treated by her primary care x-rays demonstrate small avulsion fracture at the lateral calcaneus. She was placed in a fracture boot for support and protection is here today for continuing care  On a scale of 1-10, with 10 being worst pain imaginable, he rates this pain as 2 on good days and 3 on bad days.  she describes the pain as tender.    Review of patient's allergies indicates:  No Known Allergies      Current Outpatient Medications   Medication Sig Dispense Refill    albuterol (VENTOLIN HFA) 90 mcg/actuation inhaler INHALE 1-2 PUFFS INTO THE LUNGS EVERY 4 (FOUR) HOURS AS NEEDED. 1 Inhaler 3    ALPRAZolam (XANAX) 0.25 MG tablet TAKE 1/2 TO 1 TABLET BY MOUTH BEFORE FLIGHT 20 tablet 0    amLODIPine (NORVASC) 2.5 MG tablet Take 1 tablet (2.5 mg total) by mouth once daily. 90 tablet 3    co-enzyme Q-10 30 mg capsule Take 30 mg by mouth once daily.       fexofenadine (ALLEGRA) 180 MG tablet Take by mouth daily as needed. 1 Tablet Oral Every day      ketoconazole (NIZORAL) 2 % shampoo Wash hair with medicated shampoo at least 2x/week - let sit on scalp at least 5 minutes prior to rinsing 120 mL 5    metoprolol tartrate (LOPRESSOR) 50 MG tablet TAKE 1 TABLET (50 MG TOTAL) BY MOUTH 2 (TWO) TIMES DAILY. 180 tablet 2    mirtazapine (REMERON) 15 MG tablet Take 15 mg by mouth every evening.  1    multivitamin (ONE DAILY MULTIVITAMIN) per tablet Take 1 tablet by mouth once daily.      omeprazole (PRILOSEC) 20 MG capsule TAKE 1 CAPSULE (20 MG TOTAL) BY MOUTH ONCE DAILY. 90 capsule 1    ondansetron (ZOFRAN-ODT) 4 MG TbDL Take 1 tablet (4 mg total) by  mouth every 6 (six) hours as needed (nausea). 20 tablet 0    potassium chloride SA (K-DUR,KLOR-CON) 20 MEQ tablet Two tablets tonight and Two in AM then one daily therafter 30 tablet 3    ramipril (ALTACE) 10 MG capsule TAKE 1 CAPSULE (10 MG TOTAL) BY MOUTH 2 (TWO) TIMES DAILY. 180 capsule 0    rosuvastatin (CRESTOR) 20 MG tablet TAKE 1 TABLET BY MOUTH EVERY DAY 90 tablet 1    VENTOLIN HFA 90 mcg/actuation inhaler INHALE 1-2 PUFFS INTO THE LUNGS EVERY 4 (FOUR) HOURS AS NEEDED.  1     No current facility-administered medications for this visit.        Past Medical History:   Diagnosis Date    Abnormal liver enzymes 4/6/2015    Acute on chronic kidney disease, stage 3 4/18/2013    Alcohol-induced acute pancreatitis 8/16/2015    Anemia     Basal cell carcinoma 1985    nose    Bunion, right foot 12/14/2012    Compression fracture 11/14/2013    MI (myocardial infarction) 1991    PAF (paroxysmal atrial fibrillation) 9/8/2015    During acute illness     SCC (squamous cell carcinoma) 05/11/2016    in situ left lower lip, nasal bridge       Past Surgical History:   Procedure Laterality Date    blephroplasty      BREAST BIOPSY Right     excisional 1985    BUNIONECTOMY  Jan 2013    right foot    CARDIAC CATHETERIZATION      COLONOSCOPY N/A 12/21/2016    Performed by Freedom [facility]oval [facility] at [facility] (4TH FLR)    COLONOSCOPY N/A 9/20/2016    Performed by Rachelle Guerra MD at [facility] END[facility] (4TH FLR)    EGD (ESOPHAGOGASTRODUODENOSCOPY) N/A 7/19/2018    Performed by Jefferson Mina MD at [facility] (4TH FLR)    FOOT SURGERY      right foot    HYSTERECTOMY  1980s    bleeding    OPEN REDUCTION INTERNAL FIXATION-ORBITAL FRACTURE Right 1/23/2015    Performed by Lonny Thakur [facility] at [facility] OR 2ND FLR    OPEN REDUCTION INTERNAL FIXATION-ORBITAL FRACTURE Right 10/22/2014    Performed by [facility] at [facility] OR 2[facility] FLR    orbital fx      REPAIR-ECTROPION Bilateral 3/29/2016    Performed by Lisa LAMBERT  [facility]    REPAIR-PTOSIS Bilateral 3/29/2016    [facility]formed by [facility] at [facility] FLR    TONSILLECTOMY      VAGINA SURGERY         Vital Signs (Most Recent)  Vitals:    02/04/19 1751   BP: (!) 148/78   Pulse: 94       Review of Systems:  ROS:  Constitutional: no fever or chills  Eyes: no visual changes  ENT: no nasal congestion or sore throat  Respiratory: no cough or shortness of breath  Cardiovascular: no chest pain or palpitations, Positive CAD, status post MI  Gastrointestinal: no nausea or vomiting, tolerating diet, Positive colon adenoma, alcohol-induced pancreatitis  Genitourinary: no hematuria or dysuria  Integument/Breast: no rash or pruritis  Hematologic/Lymphatic: no easy bruising or lymphadenopathy  Musculoskeletal: no arthralgias or myalgias  Neurological: no seizures or tremors, Positive history of spinal fracture, orbital blowout fracture  Behavioral/Psych: no auditory or visual hallucinations, Positive adjustment disorder with depression, alcohol abuse disorder  Endocrine: no heat or cold intolerance      OBJECTIVE:     PHYSICAL EXAM:    Weight: 57.7 kg (127 lb 3.3 oz), General Appearance: Well nourished, well developed, in no acute distress.  Neurological: Mood & affect are normal.  right  Foot/Ankle    Skin:  bruising  Swelling: none and mild  Warmth: no warmth  Tenderness: moderate  ROM: 90 degrees dorsiflexion, 180 degrees plantarflexion, 40 degrees inversion and 30 degrees eversion  Strength: limited by pain  Gait: antalgic  Stability: anterior drawer: negative, exterior rotation test: negative, Lachman: negative and Cotton test: negative    soft tissue swelling noted over the Lateral malleolus and calcaneus region  Mild ecchymosis noted at the 2nd 3rd and 4th digits        RADIOGRAPHS:  X-rays from previous visit reviewed by me demonstrate evidence of a small avulsion fracture at the anterior lateral calcaneus    ASSESSMENT/PLAN:       ICD-10-CM ICD-9-CM   1.  Closed nondisplaced fracture of body of right calcaneus, initial encounter S92.014A 825.0       Plan:  Patient will remain in fracture boot for support and protection removed to 3 times a day for gentle range of motion exercises continue ice and elevation  Follow-up in 2 weeks with new x-rays sooner symptoms dictate

## 2019-02-06 ENCOUNTER — PATIENT MESSAGE (OUTPATIENT)
Dept: INTERNAL MEDICINE | Facility: CLINIC | Age: 74
End: 2019-02-06

## 2019-02-06 NOTE — PROGRESS NOTES
Subjective:       Patient ID: Jacqueline O Favret is a 73 y.o. female.    Chief Complaint: Follow-up    HPIPt fractured her foot on Friday - doing okay otherwise.  No CP or SOB.    Review of Systems   Respiratory: Negative for shortness of breath (PND or orthopnea).    Cardiovascular: Negative for chest pain (arm pain or jaw pain).   Gastrointestinal: Negative for abdominal pain, diarrhea, nausea and vomiting.   Genitourinary: Negative for dysuria.   Neurological: Negative for seizures, syncope and headaches.       Objective:      Physical Exam   Constitutional: She is oriented to person, place, and time. She appears well-developed and well-nourished. No distress.   HENT:   Head: Normocephalic.   Mouth/Throat: Oropharynx is clear and moist.   Neck: Neck supple. No JVD present. No thyromegaly present.   Cardiovascular: Normal rate, regular rhythm, normal heart sounds and intact distal pulses. Exam reveals no gallop and no friction rub.   No murmur heard.  Pulmonary/Chest: Effort normal and breath sounds normal. She has no wheezes. She has no rales.   Breasts: No masses, discharge or adenopathy bilaterally     Abdominal: Soft. Bowel sounds are normal. She exhibits no distension and no mass. There is no tenderness. There is no rebound and no guarding.   Musculoskeletal: She exhibits no edema.   Lymphadenopathy:     She has no cervical adenopathy.   Neurological: She is alert and oriented to person, place, and time. She has normal reflexes.   Skin: Skin is warm and dry.   Psychiatric: She has a normal mood and affect. Her behavior is normal. Judgment and thought content normal.       Assessment:       1. Screening mammogram, encounter for    2. Hypokalemia        Plan:   Screening mammogram, encounter for  -     Mammo Digital Screening Bilat w/ Isauro; Future; Expected date: 02/05/2019    Hypokalemia  -     Comprehensive metabolic panel; Future; Expected date: 02/05/2019  -     Magnesium; Future; Expected date:  02/05/2019    Other orders  -     ALPRAZolam (XANAX) 0.25 MG tablet; TAKE 1/2 TO 1 TABLET BY MOUTH BEFORE FLIGHT  Dispense: 20 tablet; Refill: 0

## 2019-02-18 ENCOUNTER — OFFICE VISIT (OUTPATIENT)
Dept: ORTHOPEDICS | Facility: CLINIC | Age: 74
End: 2019-02-18
Payer: MEDICARE

## 2019-02-18 ENCOUNTER — PATIENT MESSAGE (OUTPATIENT)
Dept: INTERNAL MEDICINE | Facility: CLINIC | Age: 74
End: 2019-02-18

## 2019-02-18 ENCOUNTER — HOSPITAL ENCOUNTER (OUTPATIENT)
Dept: RADIOLOGY | Facility: HOSPITAL | Age: 74
Discharge: HOME | End: 2019-02-18
Attending: PHYSICIAN ASSISTANT
Payer: MEDICARE

## 2019-02-18 VITALS
HEIGHT: 67 IN | HEART RATE: 89 BPM | SYSTOLIC BLOOD PRESSURE: 161 MMHG | WEIGHT: 127 LBS | BODY MASS INDEX: 19.93 KG/M2 | DIASTOLIC BLOOD PRESSURE: 77 MMHG

## 2019-02-18 DIAGNOSIS — M25.571 RIGHT ANKLE PAIN, UNSPECIFIED CHRONICITY: Primary | ICD-10-CM

## 2019-02-18 DIAGNOSIS — M25.571 RIGHT ANKLE PAIN, UNSPECIFIED CHRONICITY: ICD-10-CM

## 2019-02-18 DIAGNOSIS — S92.014D CLOSED NONDISPLACED FRACTURE OF BODY OF RIGHT CALCANEUS WITH ROUTINE HEALING, SUBSEQUENT ENCOUNTER: Primary | ICD-10-CM

## 2019-02-18 PROCEDURE — 3077F PR MOST RECENT SYSTOLIC BLOOD PRESSURE >= 140 MM HG: ICD-10-PCS | Mod: CPTII,S$GLB,, | Performed by: PHYSICIAN ASSISTANT

## 2019-02-18 PROCEDURE — 99999 PR PBB SHADOW E&M-EST. PATIENT-LVL IV: CPT | Mod: PBBFAC,,, | Performed by: PHYSICIAN ASSISTANT

## 2019-02-18 PROCEDURE — 73610 X-RAY EXAM OF ANKLE: CPT | Mod: 26,RT,, | Performed by: RADIOLOGY

## 2019-02-18 PROCEDURE — 3077F SYST BP >= 140 MM HG: CPT | Mod: CPTII,S$GLB,, | Performed by: PHYSICIAN ASSISTANT

## 2019-02-18 PROCEDURE — 3078F PR MOST RECENT DIASTOLIC BLOOD PRESSURE < 80 MM HG: ICD-10-PCS | Mod: CPTII,S$GLB,, | Performed by: PHYSICIAN ASSISTANT

## 2019-02-18 PROCEDURE — 99213 OFFICE O/P EST LOW 20 MIN: CPT | Mod: S$GLB,,, | Performed by: PHYSICIAN ASSISTANT

## 2019-02-18 PROCEDURE — 73610 XR ANKLE COMPLETE 3 VIEW RIGHT: ICD-10-PCS | Mod: 26,RT,, | Performed by: RADIOLOGY

## 2019-02-18 PROCEDURE — 3078F DIAST BP <80 MM HG: CPT | Mod: CPTII,S$GLB,, | Performed by: PHYSICIAN ASSISTANT

## 2019-02-18 PROCEDURE — 99999 PR PBB SHADOW E&M-EST. PATIENT-LVL IV: ICD-10-PCS | Mod: PBBFAC,,, | Performed by: PHYSICIAN ASSISTANT

## 2019-02-18 PROCEDURE — 99213 PR OFFICE/OUTPT VISIT, EST, LEVL III, 20-29 MIN: ICD-10-PCS | Mod: S$GLB,,, | Performed by: PHYSICIAN ASSISTANT

## 2019-02-18 PROCEDURE — 73610 X-RAY EXAM OF ANKLE: CPT | Mod: TC,RT

## 2019-02-18 PROCEDURE — 3288F PR FALLS RISK ASSESSMENT DOCUMENTED: ICD-10-PCS | Mod: CPTII,S$GLB,, | Performed by: PHYSICIAN ASSISTANT

## 2019-02-18 PROCEDURE — 1100F PTFALLS ASSESS-DOCD GE2>/YR: CPT | Mod: CPTII,S$GLB,, | Performed by: PHYSICIAN ASSISTANT

## 2019-02-18 PROCEDURE — 1100F PR PT FALLS ASSESS DOC 2+ FALLS/FALL W/INJURY/YR: ICD-10-PCS | Mod: CPTII,S$GLB,, | Performed by: PHYSICIAN ASSISTANT

## 2019-02-18 PROCEDURE — 3288F FALL RISK ASSESSMENT DOCD: CPT | Mod: CPTII,S$GLB,, | Performed by: PHYSICIAN ASSISTANT

## 2019-02-19 NOTE — PROGRESS NOTES
SUBJECTIVE:     Chief Complaint & History of Present Illness:  Jacqueline O Favret is a  Established  patient 73 y.o. female who is seen here today with a complaint of  right ankle fracture .  Here today for continuing care for her nondisplaced fracture of the calcaneus of the right foot last seen and treated by me for this condition 02/04/2019.  She has been tolerating her fracture boot for a well pain has returned to baseline.  She has diligently minimize her ambulation iced and elevated the foot 2 to 3 times a day  On a scale of 1-10, with 10 being worst pain imaginable, he rates this pain as 1 on good days and 2 on bad days.  she describes the pain as sore.    Review of patient's allergies indicates:  No Known Allergies      Current Outpatient Medications   Medication Sig Dispense Refill    albuterol (VENTOLIN HFA) 90 mcg/actuation inhaler INHALE 1-2 PUFFS INTO THE LUNGS EVERY 4 (FOUR) HOURS AS NEEDED. 1 Inhaler 3    ALPRAZolam (XANAX) 0.25 MG tablet TAKE 1/2 TO 1 TABLET BY MOUTH BEFORE FLIGHT 20 tablet 0    amLODIPine (NORVASC) 2.5 MG tablet Take 1 tablet (2.5 mg total) by mouth once daily. 90 tablet 3    co-enzyme Q-10 30 mg capsule Take 30 mg by mouth once daily.       fexofenadine (ALLEGRA) 180 MG tablet Take by mouth daily as needed. 1 Tablet Oral Every day      ketoconazole (NIZORAL) 2 % shampoo Wash hair with medicated shampoo at least 2x/week - let sit on scalp at least 5 minutes prior to rinsing 120 mL 5    metoprolol tartrate (LOPRESSOR) 50 MG tablet TAKE 1 TABLET (50 MG TOTAL) BY MOUTH 2 (TWO) TIMES DAILY. 180 tablet 2    mirtazapine (REMERON) 15 MG tablet Take 15 mg by mouth every evening.  1    multivitamin (ONE DAILY MULTIVITAMIN) per tablet Take 1 tablet by mouth once daily.      omeprazole (PRILOSEC) 20 MG capsule TAKE 1 CAPSULE (20 MG TOTAL) BY MOUTH ONCE DAILY. 90 capsule 1    ondansetron (ZOFRAN-ODT) 4 MG TbDL Take 1 tablet (4 mg total) by mouth every 6 (six) hours as needed  (nausea). 20 tablet 0    potassium chloride SA (K-DUR,KLOR-CON) 20 MEQ tablet Two tablets tonight and Two in AM then one daily therafter 30 tablet 3    ramipril (ALTACE) 10 MG capsule TAKE 1 CAPSULE (10 MG TOTAL) BY MOUTH 2 (TWO) TIMES DAILY. 180 capsule 0    rosuvastatin (CRESTOR) 20 MG tablet TAKE 1 TABLET BY MOUTH EVERY DAY 90 tablet 1    VENTOLIN HFA 90 mcg/actuation inhaler INHALE 1-2 PUFFS INTO THE LUNGS EVERY 4 (FOUR) HOURS AS NEEDED.  1     No current facility-administered medications for this visit.        Past Medical History:   Diagnosis Date    Abnormal liver enzymes 4/6/2015    Acute on chronic kidney disease, stage 3 4/18/2013    Alcohol-induced acute pancreatitis 8/16/2015    Anemia     Basal cell carcinoma 1985    nose    Bunion, right foot 12/14/2012    Compression fracture 11/14/2013    MI (myocardial infarction) 1991    PAF (paroxysmal atrial fibrillation) 9/8/2015    During acute illness     SCC (squamous cell carcinoma) 05/11/2016    in situ left lower lip, nasal bridge       Past Surgical History:   Procedure Laterality Date    blephroplasty      BREAST BIOPSY Right     excisional 1985    BUNIONECTOMY  Jan 2013    right foot    CARDIAC CATHETERIZATION      COLONOSCOPY N/A 12/21/2016    [facility]formed [facility] at [facility] (4TH FLR)    COLONOSCOPY N/A 9/20/2016    [facility]formed by [facility]le [facility] at [facility] (4TH FLR)    EGD (ESOPHAGOGASTRODUODENOSCOPY) N/A 7/19/2018    Performed by Jefferson Mina [facility] at [facility] (4TH FLR)    FOOT SURGERY      right foot    HYSTERECTOMY  1980s    bleeding    OPEN REDUCTION INTERNAL FIXATION-ORBITAL FRACTURE Right 1/23/2015    [facility]formed [facility] at [facility] OR 2ND FLR    OPEN REDUCTION INTERNAL FIXATION-ORBITAL FRACTURE Right 10/22/2014    [facility]formed by [facility] at [facility] OR 2ND FLR    orbital fx      REPAIR-ECTROPION Bilateral 3/29/2016    Performed by [facility] Gao [facility] at [facility] OR 1ST FLR     "REPAIR-PTOSIS Bilateral 3/29/2016    [facility] at [facility]ST FLR    TONSILLECTOMY      VAGINA SURGERY         Vital Signs (Most Recent)  Vitals:    02/18/19 1750   BP: (!) 161/77   Pulse: 89       Review of Systems:  ROS:  Constitutional: no fever or chills  Eyes: no visual changes  ENT: no nasal congestion or sore throat  Respiratory: no cough or shortness of breath  Cardiovascular: no chest pain or palpitations, Positive CAD, status post MI  Gastrointestinal: no nausea or vomiting, tolerating diet, Positive colon adenoma, alcohol-induced pancreatitis  Genitourinary: no hematuria or dysuria  Integument/Breast: no rash or pruritis  Hematologic/Lymphatic: no easy bruising or lymphadenopathy  Musculoskeletal: no arthralgias or myalgias  Neurological: no seizures or tremors, Positive history of spinal fracture, orbital blowout fracture  Behavioral/Psych: no auditory or visual hallucinations, Positive adjustment disorder with depression, alcohol abuse disorder  Endocrine: no heat or cold intolerance           OBJECTIVE:     PHYSICAL EXAM:  Height: 5' 7" (170.2 cm) Weight: 57.6 kg (126 lb 15.8 oz), General Appearance: Well nourished, well developed, in no acute distress.  Neurological: Mood & affect are normal.  right  Foot/Ankle    Skin: normal  Swelling: none  Warmth: no warmth  Tenderness: none  ROM: 90 degrees dorsiflexion, 180 degrees plantarflexion, 45 degrees inversion and 40 degrees eversion  Strength: normal, 5 on 5 tibialis anterior strength, 5 of 5 EHL strength, 5 of 5 EDL strength, 5 of 5 FHL and 5 of 5 FDL  Gait: normal  Stability: anterior drawer: negative, exterior rotation test: negative, Lachman: negative and Cotton test: negative    normal exam, no swelling, tenderness, instability; ligaments intact, full range of motion of all ankle/foot joints          RADIOGRAPHS:  X-rays taken today films reviewed by me demonstrate no evidence of fracture dislocation all joint spaces well " maintained    ASSESSMENT/PLAN:       ICD-10-CM ICD-9-CM   1. Closed nondisplaced fracture of body of right calcaneus with routine healing, subsequent encounter S92.014D V54.19       Plan:  Patient may discontinue the fracture boot at this time work returned to good supportive footwear follow-up p.r.n.

## 2019-02-20 ENCOUNTER — OFFICE VISIT (OUTPATIENT)
Dept: OPTOMETRY | Facility: CLINIC | Age: 74
End: 2019-02-20
Payer: MEDICARE

## 2019-02-20 DIAGNOSIS — H25.13 NUCLEAR SCLEROSIS, BILATERAL: ICD-10-CM

## 2019-02-20 DIAGNOSIS — Z13.5 SCREENING FOR EYE CONDITION: ICD-10-CM

## 2019-02-20 DIAGNOSIS — H57.12 EYE PAIN, LEFT: Primary | ICD-10-CM

## 2019-02-20 DIAGNOSIS — H26.9 CORTICAL CATARACT OF BOTH EYES: ICD-10-CM

## 2019-02-20 PROCEDURE — 99999 PR PBB SHADOW E&M-EST. PATIENT-LVL III: ICD-10-PCS | Mod: PBBFAC,,, | Performed by: OPTOMETRIST

## 2019-02-20 PROCEDURE — 92012 INTRM OPH EXAM EST PATIENT: CPT | Mod: S$GLB,,, | Performed by: OPTOMETRIST

## 2019-02-20 PROCEDURE — 99999 PR PBB SHADOW E&M-EST. PATIENT-LVL III: CPT | Mod: PBBFAC,,, | Performed by: OPTOMETRIST

## 2019-02-20 PROCEDURE — 92012 PR EYE EXAM, EST PATIENT,INTERMED: ICD-10-PCS | Mod: S$GLB,,, | Performed by: OPTOMETRIST

## 2019-02-20 RX ORDER — MINOXIDIL 2.5 MG/1
TABLET ORAL
Refills: 1 | Status: ON HOLD | COMMUNITY
Start: 2019-01-28 | End: 2019-04-05

## 2019-02-20 NOTE — PROGRESS NOTES
"HPI     Eye Problem      Additional comments: Removed SCLs last night.  Following CL removal, left eye felt irritated.  Remains irriatated/painful today. Not blurry.  Eyelid swollen on left side with some "crusting"               Comments     Patient in for progress check.    Patient comes in today after removing OS CL on yesterday became irritated   and swollen. Pt reports crusty discharge OS and some light sensitivtiy.     Being followed for (diagnosis):      Date last seen:  12/07/2018    Doctor last seen:      Prescribed eye medications(s) using:  none    OTC eye medication(s) using:  No     Signs/symptoms of condition resolved/better/stable/worse?:      Allergies/Medications reviewed today?  Yes                 Last edited by Jeromy Goodman, OD on 2/20/2019  8:46 AM. (History)            Assessment /Plan     For exam results, see Encounter Report.    1. Eye pain, left     2. Nuclear sclerosis, bilateral     3. Cortical cataract of both eyes     4. Screening for eye condition                Problem with left eye after removing SCLs.  No acute problem noted today.  No evidence of corneal or conjunctival abrasion.  No evidence of corneal ulcer.  Advised patient of likely superficial corneal abrasion of the left eye last night which has now resolved.  No treatment necessary.  Leave CLs out today.  Call tomorrow if left eye still bothersome.  Otherwise okay to resume CL wear tomorrow if both eye look and feel normal      "

## 2019-02-20 NOTE — PATIENT INSTRUCTIONS
Problem with left eye after removing SCLs.  No acute problem noted today.  No evidence of corneal or conjunctival abrasion.  No evidence of corneal ulcer.  Advised patient of likely superficial corneal abrasion of the left eye last night which has now resolved.  No treatment necessary.  Leave CLs out today.  Call tomorrow if left eye still bothersome.  Otherwise okay to resume CL wear tomorrow if both eye look and feel normal

## 2019-02-25 ENCOUNTER — HOSPITAL ENCOUNTER (OUTPATIENT)
Dept: RADIOLOGY | Facility: HOSPITAL | Age: 74
Discharge: HOME | End: 2019-02-25
Attending: INTERNAL MEDICINE
Payer: MEDICARE

## 2019-02-25 DIAGNOSIS — Z12.31 SCREENING MAMMOGRAM, ENCOUNTER FOR: ICD-10-CM

## 2019-02-25 PROCEDURE — 77063 MAMMO DIGITAL SCREENING BILAT WITH TOMOSYNTHESIS_CAD: ICD-10-PCS | Mod: 26,,, | Performed by: RADIOLOGY

## 2019-02-25 PROCEDURE — 77067 MAMMO DIGITAL SCREENING BILAT WITH TOMOSYNTHESIS_CAD: ICD-10-PCS | Mod: 26,,, | Performed by: RADIOLOGY

## 2019-02-25 PROCEDURE — 77067 SCR MAMMO BI INCL CAD: CPT | Mod: 26,,, | Performed by: RADIOLOGY

## 2019-02-25 PROCEDURE — 77063 BREAST TOMOSYNTHESIS BI: CPT | Mod: 26,,, | Performed by: RADIOLOGY

## 2019-02-25 PROCEDURE — 77067 SCR MAMMO BI INCL CAD: CPT | Mod: TC

## 2019-04-02 RX ORDER — POTASSIUM CHLORIDE 20 MEQ/1
TABLET, EXTENDED RELEASE ORAL
Qty: 30 TABLET | Refills: 3 | Status: ON HOLD | OUTPATIENT
Start: 2019-04-02 | End: 2019-04-06 | Stop reason: HOSPADM

## 2019-04-03 ENCOUNTER — HOSPITAL ENCOUNTER (INPATIENT)
Facility: HOSPITAL | Age: 74
LOS: 3 days | Discharge: HOME | DRG: 684 | End: 2019-04-06
Attending: EMERGENCY MEDICINE | Admitting: INTERNAL MEDICINE
Payer: MEDICARE

## 2019-04-03 DIAGNOSIS — R10.11 RUQ ABDOMINAL PAIN: ICD-10-CM

## 2019-04-03 DIAGNOSIS — N17.9 ACUTE RENAL FAILURE, UNSPECIFIED ACUTE RENAL FAILURE TYPE: Primary | ICD-10-CM

## 2019-04-03 DIAGNOSIS — R11.0 NAUSEA: ICD-10-CM

## 2019-04-03 DIAGNOSIS — E87.6 HYPOKALEMIA: ICD-10-CM

## 2019-04-03 PROBLEM — N18.30 ACUTE RENAL FAILURE SUPERIMPOSED ON STAGE 3 CHRONIC KIDNEY DISEASE: Status: ACTIVE | Noted: 2019-04-03

## 2019-04-03 PROBLEM — R74.01 ELEVATED TRANSAMINASE LEVEL: Status: ACTIVE | Noted: 2019-04-03

## 2019-04-03 LAB
ALBUMIN SERPL BCP-MCNC: 3.8 G/DL (ref 3.5–5.2)
ALP SERPL-CCNC: 88 U/L (ref 55–135)
ALT SERPL W/O P-5'-P-CCNC: 50 U/L (ref 10–44)
ANION GAP SERPL CALC-SCNC: 18 MMOL/L (ref 8–16)
ANION GAP SERPL CALC-SCNC: 20 MMOL/L (ref 8–16)
AST SERPL-CCNC: 111 U/L (ref 10–40)
BACTERIA #/AREA URNS AUTO: NORMAL /HPF
BASOPHILS # BLD AUTO: 0.04 K/UL (ref 0–0.2)
BASOPHILS NFR BLD: 0.5 % (ref 0–1.9)
BILIRUB SERPL-MCNC: 0.8 MG/DL (ref 0.1–1)
BILIRUB UR QL STRIP: NEGATIVE
BUN SERPL-MCNC: 45 MG/DL (ref 8–23)
BUN SERPL-MCNC: 47 MG/DL (ref 8–23)
CALCIUM SERPL-MCNC: 9.1 MG/DL (ref 8.7–10.5)
CALCIUM SERPL-MCNC: 9.8 MG/DL (ref 8.7–10.5)
CHLORIDE SERPL-SCNC: 88 MMOL/L (ref 95–110)
CHLORIDE SERPL-SCNC: 94 MMOL/L (ref 95–110)
CK SERPL-CCNC: 232 U/L (ref 20–180)
CLARITY UR REFRACT.AUTO: CLEAR
CO2 SERPL-SCNC: 26 MMOL/L (ref 23–29)
CO2 SERPL-SCNC: 29 MMOL/L (ref 23–29)
COLOR UR AUTO: YELLOW
CREAT SERPL-MCNC: 4.1 MG/DL (ref 0.5–1.4)
CREAT SERPL-MCNC: 4.2 MG/DL (ref 0.5–1.4)
CREAT UR-MCNC: 29 MG/DL (ref 15–325)
DIFFERENTIAL METHOD: ABNORMAL
EOSINOPHIL # BLD AUTO: 0.1 K/UL (ref 0–0.5)
EOSINOPHIL NFR BLD: 0.7 % (ref 0–8)
ERYTHROCYTE [DISTWIDTH] IN BLOOD BY AUTOMATED COUNT: 12.6 % (ref 11.5–14.5)
EST. GFR  (AFRICAN AMERICAN): 11.5 ML/MIN/1.73 M^2
EST. GFR  (AFRICAN AMERICAN): 11.8 ML/MIN/1.73 M^2
EST. GFR  (NON AFRICAN AMERICAN): 10 ML/MIN/1.73 M^2
EST. GFR  (NON AFRICAN AMERICAN): 10.2 ML/MIN/1.73 M^2
GLUCOSE SERPL-MCNC: 83 MG/DL (ref 70–110)
GLUCOSE SERPL-MCNC: 94 MG/DL (ref 70–110)
GLUCOSE UR QL STRIP: NEGATIVE
HCT VFR BLD AUTO: 40.9 % (ref 37–48.5)
HGB BLD-MCNC: 14.1 G/DL (ref 12–16)
HGB UR QL STRIP: ABNORMAL
HYALINE CASTS UR QL AUTO: 0 /LPF
IMM GRANULOCYTES # BLD AUTO: 0.05 K/UL (ref 0–0.04)
IMM GRANULOCYTES NFR BLD AUTO: 0.7 % (ref 0–0.5)
INFLUENZA A, MOLECULAR: NEGATIVE
INFLUENZA B, MOLECULAR: NEGATIVE
KETONES UR QL STRIP: NEGATIVE
LEUKOCYTE ESTERASE UR QL STRIP: NEGATIVE
LIPASE SERPL-CCNC: 185 U/L (ref 4–60)
LYMPHOCYTES # BLD AUTO: 1.1 K/UL (ref 1–4.8)
LYMPHOCYTES NFR BLD: 15.3 % (ref 18–48)
MAGNESIUM SERPL-MCNC: 2.6 MG/DL (ref 1.6–2.6)
MCH RBC QN AUTO: 33.7 PG (ref 27–31)
MCHC RBC AUTO-ENTMCNC: 34.5 G/DL (ref 32–36)
MCV RBC AUTO: 98 FL (ref 82–98)
MICROSCOPIC COMMENT: NORMAL
MONOCYTES # BLD AUTO: 0.7 K/UL (ref 0.3–1)
MONOCYTES NFR BLD: 9.5 % (ref 4–15)
NEUTROPHILS # BLD AUTO: 5.4 K/UL (ref 1.8–7.7)
NEUTROPHILS NFR BLD: 73.3 % (ref 38–73)
NITRITE UR QL STRIP: NEGATIVE
NRBC BLD-RTO: 0 /100 WBC
PH UR STRIP: 6 [PH] (ref 5–8)
PLATELET # BLD AUTO: 135 K/UL (ref 150–350)
PMV BLD AUTO: 9.6 FL (ref 9.2–12.9)
POCT GLUCOSE: 91 MG/DL (ref 70–110)
POTASSIUM SERPL-SCNC: 3.2 MMOL/L (ref 3.5–5.1)
POTASSIUM SERPL-SCNC: 3.5 MMOL/L (ref 3.5–5.1)
PROT SERPL-MCNC: 7 G/DL (ref 6–8.4)
PROT UR QL STRIP: ABNORMAL
RBC # BLD AUTO: 4.18 M/UL (ref 4–5.4)
RBC #/AREA URNS AUTO: 2 /HPF (ref 0–4)
SODIUM SERPL-SCNC: 137 MMOL/L (ref 136–145)
SODIUM SERPL-SCNC: 138 MMOL/L (ref 136–145)
SP GR UR STRIP: 1 (ref 1–1.03)
SPECIMEN SOURCE: NORMAL
SQUAMOUS #/AREA URNS AUTO: 1 /HPF
TROPONIN I SERPL DL<=0.01 NG/ML-MCNC: 0.04 NG/ML (ref 0–0.03)
TROPONIN I SERPL DL<=0.01 NG/ML-MCNC: 0.05 NG/ML (ref 0–0.03)
URN SPEC COLLECT METH UR: ABNORMAL
UUN UR-MCNC: 161 MG/DL (ref 140–1050)
WBC # BLD AUTO: 7.4 K/UL (ref 3.9–12.7)
WBC #/AREA URNS AUTO: 1 /HPF (ref 0–5)

## 2019-04-03 PROCEDURE — 84484 ASSAY OF TROPONIN QUANT: CPT

## 2019-04-03 PROCEDURE — 87502 INFLUENZA DNA AMP PROBE: CPT

## 2019-04-03 PROCEDURE — 81001 URINALYSIS AUTO W/SCOPE: CPT

## 2019-04-03 PROCEDURE — 82550 ASSAY OF CK (CPK): CPT

## 2019-04-03 PROCEDURE — 96375 TX/PRO/DX INJ NEW DRUG ADDON: CPT | Mod: 59

## 2019-04-03 PROCEDURE — 96376 TX/PRO/DX INJ SAME DRUG ADON: CPT

## 2019-04-03 PROCEDURE — 80048 BASIC METABOLIC PNL TOTAL CA: CPT

## 2019-04-03 PROCEDURE — 99222 PR INITIAL HOSPITAL CARE,LEVL II: ICD-10-PCS | Mod: AI,,, | Performed by: INTERNAL MEDICINE

## 2019-04-03 PROCEDURE — 36415 COLL VENOUS BLD VENIPUNCTURE: CPT

## 2019-04-03 PROCEDURE — 82570 ASSAY OF URINE CREATININE: CPT

## 2019-04-03 PROCEDURE — 63600175 PHARM REV CODE 636 W HCPCS: Performed by: INTERNAL MEDICINE

## 2019-04-03 PROCEDURE — 99285 EMERGENCY DEPT VISIT HI MDM: CPT | Mod: 25

## 2019-04-03 PROCEDURE — 84484 ASSAY OF TROPONIN QUANT: CPT | Mod: 91

## 2019-04-03 PROCEDURE — 84540 ASSAY OF URINE/UREA-N: CPT

## 2019-04-03 PROCEDURE — 96361 HYDRATE IV INFUSION ADD-ON: CPT

## 2019-04-03 PROCEDURE — 82565 ASSAY OF CREATININE: CPT

## 2019-04-03 PROCEDURE — 25000003 PHARM REV CODE 250: Performed by: EMERGENCY MEDICINE

## 2019-04-03 PROCEDURE — 25000003 PHARM REV CODE 250: Performed by: INTERNAL MEDICINE

## 2019-04-03 PROCEDURE — 63600175 PHARM REV CODE 636 W HCPCS: Performed by: EMERGENCY MEDICINE

## 2019-04-03 PROCEDURE — 83735 ASSAY OF MAGNESIUM: CPT

## 2019-04-03 PROCEDURE — 83690 ASSAY OF LIPASE: CPT

## 2019-04-03 PROCEDURE — 99285 EMERGENCY DEPT VISIT HI MDM: CPT | Mod: ,,, | Performed by: EMERGENCY MEDICINE

## 2019-04-03 PROCEDURE — 99285 PR EMERGENCY DEPT VISIT,LEVEL V: ICD-10-PCS | Mod: ,,, | Performed by: EMERGENCY MEDICINE

## 2019-04-03 PROCEDURE — 11000001 HC ACUTE MED/SURG PRIVATE ROOM

## 2019-04-03 PROCEDURE — 25000003 PHARM REV CODE 250: Performed by: PHYSICIAN ASSISTANT

## 2019-04-03 PROCEDURE — 82962 GLUCOSE BLOOD TEST: CPT

## 2019-04-03 PROCEDURE — 99222 1ST HOSP IP/OBS MODERATE 55: CPT | Mod: AI,,, | Performed by: INTERNAL MEDICINE

## 2019-04-03 PROCEDURE — 96374 THER/PROPH/DIAG INJ IV PUSH: CPT

## 2019-04-03 PROCEDURE — 85025 COMPLETE CBC W/AUTO DIFF WBC: CPT

## 2019-04-03 PROCEDURE — 80053 COMPREHEN METABOLIC PANEL: CPT

## 2019-04-03 RX ORDER — POLYETHYLENE GLYCOL 3350 17 G/17G
17 POWDER, FOR SOLUTION ORAL DAILY
Status: DISCONTINUED | OUTPATIENT
Start: 2019-04-04 | End: 2019-04-04

## 2019-04-03 RX ORDER — POTASSIUM CHLORIDE 7.45 MG/ML
10 INJECTION INTRAVENOUS
Status: COMPLETED | OUTPATIENT
Start: 2019-04-03 | End: 2019-04-03

## 2019-04-03 RX ORDER — AMLODIPINE BESYLATE 2.5 MG/1
2.5 TABLET ORAL DAILY
Status: DISCONTINUED | OUTPATIENT
Start: 2019-04-04 | End: 2019-04-06 | Stop reason: HOSPADM

## 2019-04-03 RX ORDER — GLUCAGON 1 MG
1 KIT INJECTION
Status: DISCONTINUED | OUTPATIENT
Start: 2019-04-03 | End: 2019-04-06 | Stop reason: HOSPADM

## 2019-04-03 RX ORDER — BISACODYL 10 MG
10 SUPPOSITORY, RECTAL RECTAL DAILY PRN
Status: DISCONTINUED | OUTPATIENT
Start: 2019-04-03 | End: 2019-04-06 | Stop reason: HOSPADM

## 2019-04-03 RX ORDER — IBUPROFEN 200 MG
24 TABLET ORAL
Status: DISCONTINUED | OUTPATIENT
Start: 2019-04-03 | End: 2019-04-06 | Stop reason: HOSPADM

## 2019-04-03 RX ORDER — SODIUM CHLORIDE 9 MG/ML
INJECTION, SOLUTION INTRAVENOUS CONTINUOUS
Status: ACTIVE | OUTPATIENT
Start: 2019-04-03 | End: 2019-04-04

## 2019-04-03 RX ORDER — IBUPROFEN 200 MG
16 TABLET ORAL
Status: DISCONTINUED | OUTPATIENT
Start: 2019-04-03 | End: 2019-04-06 | Stop reason: HOSPADM

## 2019-04-03 RX ORDER — POTASSIUM CHLORIDE 7.45 MG/ML
10 INJECTION INTRAVENOUS
Status: DISCONTINUED | OUTPATIENT
Start: 2019-04-03 | End: 2019-04-03

## 2019-04-03 RX ORDER — RAMELTEON 8 MG/1
8 TABLET ORAL NIGHTLY PRN
Status: DISCONTINUED | OUTPATIENT
Start: 2019-04-03 | End: 2019-04-06 | Stop reason: HOSPADM

## 2019-04-03 RX ORDER — ONDANSETRON 2 MG/ML
8 INJECTION INTRAMUSCULAR; INTRAVENOUS EVERY 8 HOURS PRN
Status: DISCONTINUED | OUTPATIENT
Start: 2019-04-03 | End: 2019-04-06 | Stop reason: HOSPADM

## 2019-04-03 RX ORDER — ACETAMINOPHEN 325 MG/1
650 TABLET ORAL EVERY 4 HOURS PRN
Status: DISCONTINUED | OUTPATIENT
Start: 2019-04-03 | End: 2019-04-06 | Stop reason: HOSPADM

## 2019-04-03 RX ORDER — ALBUTEROL SULFATE 90 UG/1
1 AEROSOL, METERED RESPIRATORY (INHALATION) EVERY 4 HOURS PRN
Status: DISCONTINUED | OUTPATIENT
Start: 2019-04-03 | End: 2019-04-03

## 2019-04-03 RX ORDER — SODIUM CHLORIDE 0.9 % (FLUSH) 0.9 %
10 SYRINGE (ML) INJECTION
Status: DISCONTINUED | OUTPATIENT
Start: 2019-04-03 | End: 2019-04-03

## 2019-04-03 RX ORDER — ASPIRIN 81 MG/1
81 TABLET ORAL DAILY
Status: DISCONTINUED | OUTPATIENT
Start: 2019-04-04 | End: 2019-04-03

## 2019-04-03 RX ORDER — ONDANSETRON 2 MG/ML
4 INJECTION INTRAMUSCULAR; INTRAVENOUS
Status: COMPLETED | OUTPATIENT
Start: 2019-04-03 | End: 2019-04-03

## 2019-04-03 RX ORDER — SODIUM CHLORIDE 0.9 % (FLUSH) 0.9 %
10 SYRINGE (ML) INJECTION
Status: DISCONTINUED | OUTPATIENT
Start: 2019-04-03 | End: 2019-04-06 | Stop reason: HOSPADM

## 2019-04-03 RX ORDER — HEPARIN SODIUM 5000 [USP'U]/ML
5000 INJECTION, SOLUTION INTRAVENOUS; SUBCUTANEOUS EVERY 8 HOURS
Status: DISCONTINUED | OUTPATIENT
Start: 2019-04-03 | End: 2019-04-06 | Stop reason: HOSPADM

## 2019-04-03 RX ORDER — METOCLOPRAMIDE HYDROCHLORIDE 5 MG/ML
10 INJECTION INTRAMUSCULAR; INTRAVENOUS
Status: COMPLETED | OUTPATIENT
Start: 2019-04-03 | End: 2019-04-03

## 2019-04-03 RX ORDER — IPRATROPIUM BROMIDE AND ALBUTEROL SULFATE 2.5; .5 MG/3ML; MG/3ML
3 SOLUTION RESPIRATORY (INHALATION) EVERY 4 HOURS PRN
Status: DISCONTINUED | OUTPATIENT
Start: 2019-04-03 | End: 2019-04-06 | Stop reason: HOSPADM

## 2019-04-03 RX ORDER — MIRTAZAPINE 15 MG/1
15 TABLET, FILM COATED ORAL NIGHTLY
Status: DISCONTINUED | OUTPATIENT
Start: 2019-04-03 | End: 2019-04-06 | Stop reason: HOSPADM

## 2019-04-03 RX ORDER — METOPROLOL TARTRATE 50 MG/1
50 TABLET ORAL 2 TIMES DAILY
Status: DISCONTINUED | OUTPATIENT
Start: 2019-04-03 | End: 2019-04-06 | Stop reason: HOSPADM

## 2019-04-03 RX ADMIN — POTASSIUM CHLORIDE 10 MEQ: 10 INJECTION, SOLUTION INTRAVENOUS at 04:04

## 2019-04-03 RX ADMIN — SODIUM CHLORIDE: 0.9 INJECTION, SOLUTION INTRAVENOUS at 04:04

## 2019-04-03 RX ADMIN — RAMELTEON 8 MG: 8 TABLET, FILM COATED ORAL at 11:04

## 2019-04-03 RX ADMIN — POTASSIUM CHLORIDE 10 MEQ: 10 INJECTION, SOLUTION INTRAVENOUS at 01:04

## 2019-04-03 RX ADMIN — MIRTAZAPINE 15 MG: 15 TABLET, FILM COATED ORAL at 09:04

## 2019-04-03 RX ADMIN — SODIUM CHLORIDE 1000 ML: 0.9 INJECTION, SOLUTION INTRAVENOUS at 09:04

## 2019-04-03 RX ADMIN — ONDANSETRON 4 MG: 2 INJECTION INTRAMUSCULAR; INTRAVENOUS at 09:04

## 2019-04-03 RX ADMIN — METOPROLOL TARTRATE 50 MG: 50 TABLET ORAL at 09:04

## 2019-04-03 RX ADMIN — METOCLOPRAMIDE 10 MG: 5 INJECTION, SOLUTION INTRAMUSCULAR; INTRAVENOUS at 10:04

## 2019-04-03 RX ADMIN — SODIUM CHLORIDE 1000 ML: 0.9 INJECTION, SOLUTION INTRAVENOUS at 12:04

## 2019-04-03 RX ADMIN — POTASSIUM CHLORIDE 10 MEQ: 10 INJECTION, SOLUTION INTRAVENOUS at 12:04

## 2019-04-03 NOTE — ASSESSMENT & PLAN NOTE
- Admission creatinine 4.2, above baseline of ~1.2  - No indications for emergent HD  - UA ordered, along with urine creatinine and urine urea to calculate FEUrea given recent diuretic use  - Etiology likely pre-renal given history of decreased PO intake/volume loss, physical examination, and hypochloremia   - Already received 2L fluids in the ED, will continue empiric treatment with continuous infusion of NS at 100 mL/hr x 24 hours and re-assess. May need renal imaging if no improvement  - Will renally dose all medications and avoid nephrotoxins including IV contrast agents  - Will follow with CMP, daily weights, and strict I/O measurement.

## 2019-04-03 NOTE — HPI
Ms. Favret is a 73-year-old woman with HTN, HLD, CAD (MI in 1991), and CKD stage III (baseline creatinine ~1-1.2) who presents with nausea and malaise. Her symptoms started five days ago with increasing frequency of small solid bowel movements (> 5 daily), accompanied shortly thereafter by nausea and diminished appetite. Around that time she developed a dry cough and nasal congestion. No fevers, chills, or night sweats. No chest pain, shortness of breath, palpitations, or edema. She reports reduced UOP, though improved with fluids in the ED. No NSAID use. She does take ramipril.    Upon arrival to the ED she was hemodynamically stable. Lab workup significant for severe SHYANNE with creatinine 4.2, hypokalemia, and hypochloremia, along with modest transaminase elevation. Troponin was 0.048. A RUQ U/S was performed that revealed possible hepatic steatosis. She was given 2L of NS and Reglan with improvement in her symptoms.

## 2019-04-03 NOTE — ASSESSMENT & PLAN NOTE
- Admission K 3.2  - Replaced by ED; f/u BMP ordered for this afternoon - will need to be cautious given SHYANNE  - Mg ordered as add-on

## 2019-04-03 NOTE — ASSESSMENT & PLAN NOTE
- Holding home rosuvastatin given elevated transaminases  - Will likely resume tomorrow pending improvement with hydration  - Cardiac diet

## 2019-04-03 NOTE — ED PROVIDER NOTES
"Encounter Date: 4/3/2019    SCRIBE #1 NOTE: I, Mario Pollard, am scribing for, and in the presence of,  Alexis Barnett MD. I have scribed the following portions of the note - Other sections scribed: HPI, ROS, PE, MDM.       History     Chief Complaint   Patient presents with    Bloated     states excessive defecation-- but denies diarrhea    Nausea     Jacqueline O Favret is a 73 y.o. female who  has a past medical history of abnormal liver enzymes, acute on chronic kidney disease, stage 3, anemia, compression fracture, MI, and SCC.    The patient presents to the ED due to nausea, fatigue, and feeling generally unwell.   Patient reports symptoms started 2 days ago and have been constant since they began. The patient describes her symptoms as "just feeling lousy" and having a lack of energy. This morning when she woke up, she felt much worse than she has the past two days and decided that she should come to the ED. After getting out of bed this morning she had one episode of vomiting, which was the first. Patient endorses having multiple episodes of loose stools all day yesterday and today, and having no appetite or energy. She states that she feels more distended than normal. Patient denies SOB, CP, abdominal pain, history of abdominal surgery, rhinorrhea, or sore throat.  No recent NSAID use.     The history is provided by the patient.     Review of patient's allergies indicates:  No Known Allergies  Past Medical History:   Diagnosis Date    Abnormal liver enzymes 4/6/2015    Acute on chronic kidney disease, stage 3 4/18/2013    Alcohol-induced acute pancreatitis 8/16/2015    Anemia     Basal cell carcinoma 1985    nose    Bunion, right foot 12/14/2012    Compression fracture 11/14/2013    MI (myocardial infarction) 1991    PAF (paroxysmal atrial fibrillation) 9/8/2015    During acute illness     SCC (squamous cell carcinoma) 05/11/2016    in situ left lower lip, nasal bridge     Past Surgical History: "   Procedure Laterality Date    blephroplasty      BREAST BIOPSY Right     excisional 1985    BUNIONECTOMY  Jan 2013    right foot    CARDIAC CATHETERIZATION      COLONOSCOPY N/A 12/21/2016    [facility]formed [facility] (4TH FLR)    COLONOSCOPY N/A 9/20/2016    [facility] by [facility]le [facility] at [facility] (4TH FLR)    EGD (ESOPHAGOGASTRODUODENOSCOPY) N/A 7/19/2018    Performed by Jefferson Mina [facility] at [facility] END[facility] (4TH FLR)    FOOT SURGERY      right foot    HYSTERECTOMY  1980s    bleeding    OPEN REDUCTION INTERNAL FIXATION-ORBITAL FRACTURE Right 1/23/2015    [facility] at [facility] OR 2[facility] FLR    OPEN REDUCTION INTERNAL FIXATION-ORBITAL FRACTURE Right 10/22/2014    [facility]    orbital fx      REPAIR-ECTROPION Bilateral 3/29/2016    [facility]LR    REPAIR-PTOSIS Bilateral 3/29/2016    [facility] at [facility]    TONSILLECTOMY      VAGINA SURGERY       Family History   Problem Relation Age of Onset    Diabetes Mother     Heart disease Father 57        sudden death    Celiac disease Neg Hx     Colon cancer Neg Hx     Crohn's disease Neg Hx     Esophageal cancer Neg Hx     Inflammatory bowel disease Neg Hx     Irritable bowel syndrome Neg Hx     Liver cancer Neg Hx     Rectal cancer Neg Hx     Stomach cancer Neg Hx     Ulcerative colitis Neg Hx     Melanoma Neg Hx      Social History     Tobacco Use    Smoking status: Former Smoker     Packs/day: 1.00     Years: 15.00     Pack years: 15.00     Types: Cigarettes     Last attempt to quit: 4/6/2004     Years since quitting: 15.0    Smokeless tobacco: Never Used   Substance Use Topics    Alcohol use: Yes     Alcohol/week: 3.0 - 6.0 oz     Types: 5 - 10 Standard drinks or equivalent per week     Comment: 1 glass scotch a day, last use last night    Drug use: No     Review of Systems    Constitutional: Positive for appetite change, chills and fatigue.   HENT: Positive for congestion. Negative for rhinorrhea and sore throat.    Respiratory: Negative for cough and shortness of breath.    Cardiovascular: Negative for chest pain and leg swelling.   Gastrointestinal: Positive for abdominal distention, diarrhea, nausea and vomiting (1 episode). Negative for abdominal pain and constipation.   Genitourinary: Negative for dysuria, frequency and urgency.   Musculoskeletal: Negative for arthralgias and myalgias.   Skin: Negative for pallor and rash.   Neurological: Negative for tremors and syncope.   Hematological: Does not bruise/bleed easily.       Physical Exam     Initial Vitals [04/03/19 0852]   BP Pulse Resp Temp SpO2   (!) 147/65 62 16 98.4 °F (36.9 °C) 97 %      MAP       --         Physical Exam    Vitals reviewed.      Physical Exam:  GENERAL APPEARANCE: Well developed, well nourished, in no acute distress.  HENT: Normocephalic, atraumatic    EYES: Sclerae anicteric   NECK: Supple, no thyroid enlargement  CARDIOVASCULAR: Regular rate and rhythm without any murmurs, gallops, rubs.  LUNGS: Speaking in full sentences. Breathing comfortably. Auscultation of the lungs revealed normal breath sounds b/l  ABDOMEN: Soft, no masses, no rebound or guarding. Mild RUQ and epigastric tenderness to palpation, abdomen is not significantly distended.  There is no lower abdominal tenderness to palpation.  NEUROLOGIC: Alert, interacting normally. No facial droop.   MSK: Moving all four extremities.  Skin: Warm and dry. No visible rash on exposed areas of skin.    Psych: Mood and affect normal.     ED Course   Procedures  Labs Reviewed - No data to display  EKG Readings: (Independently Interpreted)   Normal sinus rhythm, no signs of ischemia.       Imaging Results    None          Medical Decision Making:   Initial Assessment:   73 year old female with general malaise and vomiting and loose stools with epigastric/RUQ  pain. Will workup with labs, IV fluids. Not consistent with ACS but given her age, will obtain troponin and EKG. Given RUQ abdominal symptoms will obtain RUQ ultrasound.  Clinical Tests:   Lab Tests: Ordered and Reviewed  Radiological Study: Ordered and Reviewed  Medical Tests: Ordered and Reviewed  ED Management:  10:55 AM  On labs, patient with acute renal failure ?dehydration. Will give further fluids and obtain CK level. Will require admission.    Update:  CK level is not remarkable.  No history of NSAID use.  Unclear cause of renal failure this time.  Right upper quadrant ultrasound is unremarkable. Abdomen is otherwise nonsurgical.  Not consistent with obstruction at this time.   Will admit to Medicine for further evaluation of acute renal failure and continued monitoring, and evaluation for need for any further imaging.    She is also making small amounts of troponins, however in the setting of acute renal failure, normal EKG, and no chest pain, this not consistent with ACS.  Will likely need to be trended.    Blood pressure noted. No acute interventions for this at this time.    MDM Complexity Points:   Problem Points:  1.New problem, with no additional ED work-up planned (maximum of 1) - general malaise, vomiting.   2.Self-limited or minor (maximum of 2) - elevated blood pressure.    Data Points:  Review or order clinical lab tests, Review or order radiology test, Review or order medicine test (PFTs, EKG, cardiac echo or catheterization), Independent review of image, tracing, or specimen and Decision to obtain old records (in the EHR)    Risk:  High Risk              Scribe Attestation:   Scribe #1: I performed the above scribed service and the documentation accurately describes the services I performed. I attest to the accuracy of the note.               Clinical Impression:       ICD-10-CM ICD-9-CM   1. Nausea R11.0 787.02   2. RUQ abdominal pain R10.11 789.01                                Alexis BATES  MD Ericka  04/03/19 7181

## 2019-04-03 NOTE — ASSESSMENT & PLAN NOTE
- Stable, mildly elevated on admission  - Holding home BP medications in the setting of SHYANNE  - Will start hydralazine as a temporary measure if BP worsens, otherwise cardiac diet

## 2019-04-03 NOTE — HOSPITAL COURSE
4/3/2019: Admitted to -V with Dr. Padilla, flu PCR pending, SHYANNE workup underway, treating empirically for hypovolemia  4/4/2019: Symptomatically improved, SHYANNE worsening despite hydration. . Influenza negative. Nephrology consulted  4/5/2019: Now essentially asymptomatic, but with only marginally improved SHYANNE and worsening hypokalemia

## 2019-04-03 NOTE — ASSESSMENT & PLAN NOTE
"  - Stable with no signs or symptoms of ACS  - ECG stable, admission troponin mildly elevated but likely a consequence of her renal dysfunction. Will trend given demographics  - Otherwise holding rosuvastatin (elevated transaminases) and ramipril (elevated creatinine)  - Not on asa as an outpatient - follows actively with PCP. Most recent stress test in our system (from 2015) suggestive of "mild, diffuse, non-obstructive coronary atherosclerosis without clinically significant, localized perfusion defects" per interpreting cardiologist  "

## 2019-04-03 NOTE — ASSESSMENT & PLAN NOTE
- AST and ALT elevated on arrival  - Not a new issue, but worse than prior  - RUQ US showing potential hepatic steatosis  - Will trend with hydration and hold rosuvastatin overnight

## 2019-04-03 NOTE — ED TRIAGE NOTES
"Patient states " I feel lousy" Positive nausea, poor appetite, states she did not have diarrhea yesterday but had bowel movements "all day long" positive chills. Denies pain.  "

## 2019-04-03 NOTE — SUBJECTIVE & OBJECTIVE
Past Medical History:   Diagnosis Date    Abnormal liver enzymes 4/6/2015    Acute on chronic kidney disease, stage 3 4/18/2013    Alcohol-induced acute pancreatitis 8/16/2015    Anemia     Basal cell carcinoma 1985    nose    Bunion, right foot 12/14/2012    Compression fracture 11/14/2013    MI (myocardial infarction) 1991    PAF (paroxysmal atrial fibrillation) 9/8/2015    During acute illness     SCC (squamous cell carcinoma) 05/11/2016    in situ left lower lip, nasal bridge       Past Surgical History:   Procedure Laterality Date    blephroplasty      BREAST BIOPSY Right     excisional 1985    BUNIONECTOMY  Jan 2013    right foot    CARDIAC CATHETERIZATION      COLONOSCOPY N/A 12/21/2016    [facility] ([facility])    COLONOSCOPY N/A 9/20/2016    Performed by Rachelle [facility]erra [facility] at [facility] END[facility] (4TH FLR)    EGD (ESOPHAGOGASTRODUODENOSCOPY) N/A 7/19/2018    Performed by Jefferson Mina [facility] at [facility] ENDO (4TH FLR)    FOOT SURGERY      right foot    HYSTERECTOMY  1980s    bleeding    OPEN REDUCTION INTERNAL FIXATION-ORBITAL FRACTURE Right 1/23/2015    [facility] at [facility] OR 2[facility] FLR    OPEN REDUCTION INTERNAL FIXATION-ORBITAL FRACTURE Right 10/22/2014    [facility] at [facility] OR 2ND FLR    orbital fx      REPAIR-ECTROPION Bilateral 3/29/2016    Performed by Lisa aGo MD at [facility] OR 1ST FLR    REPAIR-PTOSIS Bilateral 3/29/2016    Performed by [facility] Gao [facility] at [facility]LR    TONSILLECTOMY      VAGINA SURGERY         Review of patient's allergies indicates:  No Known Allergies    No current facility-administered medications on file prior to encounter.      Current Outpatient Medications on File Prior to Encounter   Medication Sig    amLODIPine (NORVASC) 2.5 MG tablet Take 1 tablet (2.5 mg total) by mouth once daily.    metoprolol tartrate (LOPRESSOR) 50 MG tablet TAKE 1 TABLET (50 MG TOTAL) BY MOUTH 2  (TWO) TIMES DAILY.    omeprazole (PRILOSEC) 20 MG capsule TAKE 1 CAPSULE (20 MG TOTAL) BY MOUTH ONCE DAILY.    ramipril (ALTACE) 10 MG capsule TAKE 1 CAPSULE (10 MG TOTAL) BY MOUTH 2 (TWO) TIMES DAILY.    rosuvastatin (CRESTOR) 20 MG tablet TAKE 1 TABLET BY MOUTH EVERY DAY    albuterol (VENTOLIN HFA) 90 mcg/actuation inhaler INHALE 1-2 PUFFS INTO THE LUNGS EVERY 4 (FOUR) HOURS AS NEEDED.    ALPRAZolam (XANAX) 0.25 MG tablet TAKE 1/2 TO 1 TABLET BY MOUTH BEFORE FLIGHT    co-enzyme Q-10 30 mg capsule Take 30 mg by mouth once daily.     fexofenadine (ALLEGRA) 180 MG tablet Take by mouth daily as needed. 1 Tablet Oral Every day    ketoconazole (NIZORAL) 2 % shampoo Wash hair with medicated shampoo at least 2x/week - let sit on scalp at least 5 minutes prior to rinsing    minoxidil (LONITEN) 2.5 MG tablet TAKE 1/4-1/2 TABLET BY MOUTH EVERY DAY    mirtazapine (REMERON) 15 MG tablet Take 15 mg by mouth every evening.    multivitamin (ONE DAILY MULTIVITAMIN) per tablet Take 1 tablet by mouth once daily.    ondansetron (ZOFRAN-ODT) 4 MG TbDL Take 1 tablet (4 mg total) by mouth every 6 (six) hours as needed (nausea).    potassium chloride SA (K-DUR,KLOR-CON) 20 MEQ tablet TWO TABLETS TONIGHT AND TWO IN AM THEN ONE DAILY THERAFTER    VENTOLIN HFA 90 mcg/actuation inhaler INHALE 1-2 PUFFS INTO THE LUNGS EVERY 4 (FOUR) HOURS AS NEEDED.     Family History     Problem Relation (Age of Onset)    Diabetes Mother    Heart disease Father (57)        Tobacco Use    Smoking status: Former Smoker     Packs/day: 1.00     Years: 15.00     Pack years: 15.00     Types: Cigarettes     Last attempt to quit: 4/6/2004     Years since quitting: 15.0    Smokeless tobacco: Never Used   Substance and Sexual Activity    Alcohol use: Yes     Alcohol/week: 3.0 - 6.0 oz     Types: 5 - 10 Standard drinks or equivalent per week     Comment: 1 glass scotch a day, last use last night    Drug use: No    Sexual activity: Never     Partners:  Male     Birth control/protection: None     Review of Systems   Constitutional: Positive for activity change and appetite change. Negative for chills and fever.   HENT: Positive for congestion.    Respiratory: Positive for cough. Negative for shortness of breath.    Cardiovascular: Negative for chest pain and leg swelling.   Gastrointestinal: Positive for nausea. Negative for abdominal pain, constipation, diarrhea (though increased frequency of small normal consistency bowel movements) and vomiting.   Genitourinary: Positive for difficulty urinating. Negative for dysuria.   Musculoskeletal: Positive for myalgias. Negative for arthralgias and back pain.   Skin: Negative for rash and wound.   Neurological: Positive for weakness (diffuse). Negative for numbness.   Psychiatric/Behavioral: Negative for dysphoric mood. The patient is not nervous/anxious.      Objective:     Vital Signs (Most Recent):  Temp: 98.4 °F (36.9 °C) (04/03/19 0852)  Pulse: 67 (04/03/19 1147)  Resp: 18 (04/03/19 1147)  BP: (!) 158/68 (04/03/19 1147)  SpO2: 100 % (04/03/19 1147) Vital Signs (24h Range):  Temp:  [98.4 °F (36.9 °C)] 98.4 °F (36.9 °C)  Pulse:  [62-67] 67  Resp:  [16-18] 18  SpO2:  [97 %-100 %] 100 %  BP: (147-158)/(65-68) 158/68     Weight: 58.5 kg (129 lb)  Body mass index is 20.2 kg/m².    Physical Exam   Constitutional: She is oriented to person, place, and time. No distress.   HENT:   Dry mucous membranes   Eyes: Pupils are equal, round, and reactive to light.   Cardiovascular: Normal rate and regular rhythm.   No murmur heard.  Pulmonary/Chest: Effort normal and breath sounds normal. No respiratory distress. She has no wheezes.   Abdominal: Soft. Bowel sounds are normal. She exhibits no distension. There is no tenderness.   Musculoskeletal: She exhibits no edema or tenderness.   Neurological: She is alert and oriented to person, place, and time. She displays normal reflexes. No cranial nerve deficit.   Skin: Skin is warm and dry.  "No rash noted. She is not diaphoretic. No erythema.   Psychiatric: She has a normal mood and affect. Her behavior is normal.         CRANIAL NERVES     CN III, IV, VI   Pupils are equal, round, and reactive to light.       Significant Labs:     CBC:  Recent Labs   Lab 04/03/19  0931   WBC 7.40   GRAN 73.3*  5.4   HGB 14.1   HCT 40.9   *       Chem 10:  Recent Labs   Lab 04/03/19  0931      K 3.2*   CL 88*   CO2 29   BUN 47*   CREATININE 4.2*   GLU 83   CALCIUM 9.8       LFTs:  Recent Labs   Lab 04/03/19  0931   ALKPHOS 88   BILITOT 0.8   *   ALT 50*   ALBUMIN 3.8       Significant Imaging:     CXR  Pending    US RUQ  "Mildly increased hepatic parenchymal echogenicity with could reflect hepatic steatosis." - per interpreting radiologist  "

## 2019-04-03 NOTE — H&P
[facility] Medicine  History & Physical    Patient Name: Jacqueline O Favret  MRN: [id number]  Admission Date: 4/3/2019  Attending Physician: Cristi Padilla MD   Primary Care Provider: Kristina Orozco MD    [facility] Medicine Team: [facility]  Cristi Padilla MD     Patient information was obtained from patient, past medical records and ER records.     Subjective:     Principal Problem:Acute renal failure superimposed on stage 3 chronic kidney disease    Chief Complaint:   Chief Complaint   Patient presents with    Bloated     states excessive defecation-- but denies diarrhea    Nausea        HPI: Ms. Favret is a 73-year-old woman with HTN, HLD, CAD (MI in 1991), and CKD stage III (baseline creatinine ~1-1.2) who presents with nausea and malaise. Her symptoms started five days ago with increasing frequency of small solid bowel movements (> 5 daily), accompanied shortly thereafter by nausea and diminished appetite. Around that time she developed a dry cough and nasal congestion. No fevers, chills, or night sweats. No chest pain, shortness of breath, palpitations, or edema. She reports reduced UOP, though improved with fluids in the ED. No NSAID use. She does take ramipril.    Upon arrival to the ED she was hemodynamically stable. Lab workup significant for severe SHYANNE with creatinine 4.2, hypokalemia, and hypochloremia, along with modest transaminase elevation. Troponin was 0.048. A RUQ U/S was performed that revealed possible hepatic steatosis. She was given 2L of NS and Reglan with improvement in her symptoms.       Past Medical History:   Diagnosis Date    Abnormal liver enzymes 4/6/2015    Acute on chronic kidney disease, stage 3 4/18/2013    Alcohol-induced acute pancreatitis 8/16/2015    Anemia     Basal cell carcinoma 1985    nose    Bunion, right foot 12/14/2012    Compression fracture 11/14/2013    MI (myocardial infarction) 1991    PAF (paroxysmal atrial  fibrillation) 9/8/2015    During acute illness     SCC (squamous cell carcinoma) 05/11/2016    in situ left lower lip, nasal bridge       Past Surgical History:   Procedure Laterality Date    blephroplasty      BREAST BIOPSY Right     excisional 1985    BUNIONECTOMY  Jan 2013    right foot    CARDIAC CATHETERIZATION      COLONOSCOPY N/A 12/21/2016    Performed by [facility]oval [facility] at [facility] ENDO (4TH FLR)    COLONOSCOPY N/A 9/20/2016    Performed by Rachelle Guerra MD at [facility] END[facility] (4TH FLR)    EGD (ESOPHAGOGASTRODUODENOSCOPY) N/A 7/19/2018    Performed by Jefferson Mina MD at [facility] ENDO (4TH FLR)    FOOT SURGERY      right foot    HYSTERECTOMY  1980s    bleeding    OPEN REDUCTION INTERNAL FIXATION-ORBITAL FRACTURE Right 1/23/2015    Performed by Lonny Thakur MD at [facility] OR 2ND FLR    OPEN REDUCTION INTERNAL FIXATION-ORBITAL FRACTURE Right 10/22/2014    Performed by Lonny [facility]ur MD at [facility] OR 2ND FLR    orbital fx      REPAIR-ECTROPION Bilateral 3/29/2016    Performed by Lisa Gao MD at [facility] OR 1ST FLR    REPAIR-PTOSIS Bilateral 3/29/2016    Performed by Lisa Gao MD at [facility] OR 1ST FLR    TONSILLECTOMY      VAGINA SURGERY         Review of patient's allergies indicates:  No Known Allergies    No current facility-administered medications on file prior to encounter.      Current Outpatient Medications on File Prior to Encounter   Medication Sig    amLODIPine (NORVASC) 2.5 MG tablet Take 1 tablet (2.5 mg total) by mouth once daily.    metoprolol tartrate (LOPRESSOR) 50 MG tablet TAKE 1 TABLET (50 MG TOTAL) BY MOUTH 2 (TWO) TIMES DAILY.    omeprazole (PRILOSEC) 20 MG capsule TAKE 1 CAPSULE (20 MG TOTAL) BY MOUTH ONCE DAILY.    ramipril (ALTACE) 10 MG capsule TAKE 1 CAPSULE (10 MG TOTAL) BY MOUTH 2 (TWO) TIMES DAILY.    rosuvastatin (CRESTOR) 20 MG tablet TAKE 1 TABLET BY MOUTH EVERY DAY    albuterol (VENTOLIN HFA) 90 mcg/actuation inhaler INHALE 1-2 PUFFS INTO THE LUNGS  EVERY 4 (FOUR) HOURS AS NEEDED.    ALPRAZolam (XANAX) 0.25 MG tablet TAKE 1/2 TO 1 TABLET BY MOUTH BEFORE FLIGHT    co-enzyme Q-10 30 mg capsule Take 30 mg by mouth once daily.     fexofenadine (ALLEGRA) 180 MG tablet Take by mouth daily as needed. 1 Tablet Oral Every day    ketoconazole (NIZORAL) 2 % shampoo Wash hair with medicated shampoo at least 2x/week - let sit on scalp at least 5 minutes prior to rinsing    minoxidil (LONITEN) 2.5 MG tablet TAKE 1/4-1/2 TABLET BY MOUTH EVERY DAY    mirtazapine (REMERON) 15 MG tablet Take 15 mg by mouth every evening.    multivitamin (ONE DAILY MULTIVITAMIN) per tablet Take 1 tablet by mouth once daily.    ondansetron (ZOFRAN-ODT) 4 MG TbDL Take 1 tablet (4 mg total) by mouth every 6 (six) hours as needed (nausea).    potassium chloride SA (K-DUR,KLOR-CON) 20 MEQ tablet TWO TABLETS TONIGHT AND TWO IN AM THEN ONE DAILY THERAFTER    VENTOLIN HFA 90 mcg/actuation inhaler INHALE 1-2 PUFFS INTO THE LUNGS EVERY 4 (FOUR) HOURS AS NEEDED.     Family History     Problem Relation (Age of Onset)    Diabetes Mother    Heart disease Father (57)        Tobacco Use    Smoking status: Former Smoker     Packs/day: 1.00     Years: 15.00     Pack years: 15.00     Types: Cigarettes     Last attempt to quit: 4/6/2004     Years since quitting: 15.0    Smokeless tobacco: Never Used   Substance and Sexual Activity    Alcohol use: Yes     Alcohol/week: 3.0 - 6.0 oz     Types: 5 - 10 Standard drinks or equivalent per week     Comment: 1 glass scotch a day, last use last night    Drug use: No    Sexual activity: Never     Partners: Male     Birth control/protection: None     Review of Systems   Constitutional: Positive for activity change and appetite change. Negative for chills and fever.   HENT: Positive for congestion.    Respiratory: Positive for cough. Negative for shortness of breath.    Cardiovascular: Negative for chest pain and leg swelling.   Gastrointestinal: Positive for  nausea. Negative for abdominal pain, constipation, diarrhea (though increased frequency of small normal consistency bowel movements) and vomiting.   Genitourinary: Positive for difficulty urinating. Negative for dysuria.   Musculoskeletal: Positive for myalgias. Negative for arthralgias and back pain.   Skin: Negative for rash and wound.   Neurological: Positive for weakness (diffuse). Negative for numbness.   Psychiatric/Behavioral: Negative for dysphoric mood. The patient is not nervous/anxious.      Objective:     Vital Signs (Most Recent):  Temp: 98.4 °F (36.9 °C) (04/03/19 0852)  Pulse: 67 (04/03/19 1147)  Resp: 18 (04/03/19 1147)  BP: (!) 158/68 (04/03/19 1147)  SpO2: 100 % (04/03/19 1147) Vital Signs (24h Range):  Temp:  [98.4 °F (36.9 °C)] 98.4 °F (36.9 °C)  Pulse:  [62-67] 67  Resp:  [16-18] 18  SpO2:  [97 %-100 %] 100 %  BP: (147-158)/(65-68) 158/68     Weight: 58.5 kg (129 lb)  Body mass index is 20.2 kg/m².    Physical Exam   Constitutional: She is oriented to person, place, and time. No distress.   HENT:   Dry mucous membranes   Eyes: Pupils are equal, round, and reactive to light.   Cardiovascular: Normal rate and regular rhythm.   No murmur heard.  Pulmonary/Chest: Effort normal and breath sounds normal. No respiratory distress. She has no wheezes.   Abdominal: Soft. Bowel sounds are normal. She exhibits no distension. There is no tenderness.   Musculoskeletal: She exhibits no edema or tenderness.   Neurological: She is alert and oriented to person, place, and time. She displays normal reflexes. No cranial nerve deficit.   Skin: Skin is warm and dry. No rash noted. She is not diaphoretic. No erythema.   Psychiatric: She has a normal mood and affect. Her behavior is normal.         CRANIAL NERVES     CN III, IV, VI   Pupils are equal, round, and reactive to light.       Significant Labs:     CBC:  Recent Labs   Lab 04/03/19  0931   WBC 7.40   GRAN 73.3*  5.4   HGB 14.1   HCT 40.9   *       Chem  "10:  Recent Labs   Lab 04/03/19  0931      K 3.2*   CL 88*   CO2 29   BUN 47*   CREATININE 4.2*   GLU 83   CALCIUM 9.8       LFTs:  Recent Labs   Lab 04/03/19  0931   ALKPHOS 88   BILITOT 0.8   *   ALT 50*   ALBUMIN 3.8       Significant Imaging:     CXR  Pending    US RUQ  "Mildly increased hepatic parenchymal echogenicity with could reflect hepatic steatosis." - per interpreting radiologist    Assessment/Plan:     * Acute renal failure superimposed on stage 3 chronic kidney disease    - Admission creatinine 4.2, above baseline of ~1.2  - No indications for emergent HD  - UA ordered, along with urine creatinine and urine urea to calculate FEUrea given recent diuretic use  - Etiology likely pre-renal given history of decreased PO intake/volume loss, physical examination, and hypochloremia   - Already received 2L fluids in the ED, will continue empiric treatment with continuous infusion of NS at 100 mL/hr x 24 hours and re-assess. May need renal imaging if no improvement  - Will renally dose all medications and avoid nephrotoxins including IV contrast agents  - Will follow with CMP, daily weights, and strict I/O measurement.     Elevated transaminase level    - AST and ALT elevated on arrival  - Not a new issue, but worse than prior  - RUQ US showing potential hepatic steatosis  - Will trend with hydration and hold rosuvastatin overnight    Hypokalemia    - Admission K 3.2  - Replaced by ED; f/u BMP ordered for this afternoon - will need to be cautious given SHYANNE  - Mg ordered as add-on    Adjustment disorder with depressed mood    - Stable  - Continue home mirtazepine    Coronary artery disease    - Stable with no signs or symptoms of ACS  - ECG stable, admission troponin mildly elevated but likely a consequence of her renal dysfunction. Will trend given demographics  - Otherwise holding rosuvastatin (elevated transaminases) and ramipril (elevated creatinine)  - Not on asa as an outpatient - follows " "actively with PCP. Most recent stress test in our system (from 2015) suggestive of "mild, diffuse, non-obstructive coronary atherosclerosis without clinically significant, localized perfusion defects" per interpreting cardiologist    Hyperlipidemia    - Holding home rosuvastatin given elevated transaminases  - Will likely resume tomorrow pending improvement with hydration  - Cardiac diet    Hypertension    - Stable, mildly elevated on admission  - Holding home BP medications in the setting of SHYANNE  - Will start hydralazine as a temporary measure if BP worsens, otherwise cardiac diet      VTE Risk Mitigation (From admission, onward)        Ordered     heparin (porcine) injection 5,000 Units  Every 8 hours      04/03/19 1227     IP VTE HIGH RISK PATIENT  Once      04/03/19 1227             Cristi Padilla MD  Department of Hospital Medicine   [facility]  "

## 2019-04-03 NOTE — ED NOTES
Patient identifiers verified and correct for Ms Favret  C/C: Nausea SEE NN  APPEARANCE: awake and alert in NAD.  SKIN: warm, dry and intact. No breakdown or bruising.  MUSCULOSKELETAL: Patient moving all extremities spontaneously, no obvious swelling or deformities noted. Ambulates independently.  RESPIRATORY: Denies shortness of breath.Respirations unlabored.Positve chills Positive cough  CARDIAC: Denies CP, 2+ distal pulses; no peripheral edema  ABDOMEN: Abdomen soft, positive nausea, no emesis, denies diarrhea, denies pain   : voids spontaneously, denies difficulty  Neurologic: AAO x 4; follows commands equal strength in all extremities; denies numbness/tingling. Denies dizziness Positive gen weakness

## 2019-04-04 LAB
ALBUMIN SERPL BCP-MCNC: 3.2 G/DL (ref 3.5–5.2)
ALP SERPL-CCNC: 65 U/L (ref 55–135)
ALT SERPL W/O P-5'-P-CCNC: 39 U/L (ref 10–44)
ANION GAP SERPL CALC-SCNC: 14 MMOL/L (ref 8–16)
AST SERPL-CCNC: 85 U/L (ref 10–40)
BASOPHILS # BLD AUTO: 0.03 K/UL (ref 0–0.2)
BASOPHILS NFR BLD: 0.5 % (ref 0–1.9)
BILIRUB SERPL-MCNC: 0.8 MG/DL (ref 0.1–1)
BUN SERPL-MCNC: 43 MG/DL (ref 8–23)
CALCIUM SERPL-MCNC: 8.3 MG/DL (ref 8.7–10.5)
CHLORIDE SERPL-SCNC: 96 MMOL/L (ref 95–110)
CK SERPL-CCNC: 372 U/L (ref 20–180)
CO2 SERPL-SCNC: 28 MMOL/L (ref 23–29)
CREAT SERPL-MCNC: 4.7 MG/DL (ref 0.5–1.4)
CREAT UR-MCNC: 18 MG/DL (ref 15–325)
DIFFERENTIAL METHOD: ABNORMAL
EOSINOPHIL # BLD AUTO: 0.1 K/UL (ref 0–0.5)
EOSINOPHIL NFR BLD: 0.9 % (ref 0–8)
ERYTHROCYTE [DISTWIDTH] IN BLOOD BY AUTOMATED COUNT: 12.8 % (ref 11.5–14.5)
EST. GFR  (AFRICAN AMERICAN): 10 ML/MIN/1.73 M^2
EST. GFR  (NON AFRICAN AMERICAN): 8.7 ML/MIN/1.73 M^2
ESTIMATED AVG GLUCOSE: 97 MG/DL (ref 68–131)
GLUCOSE SERPL-MCNC: 107 MG/DL (ref 70–110)
HBA1C MFR BLD HPLC: 5 % (ref 4–5.6)
HCT VFR BLD AUTO: 33.7 % (ref 37–48.5)
HGB BLD-MCNC: 11.5 G/DL (ref 12–16)
IMM GRANULOCYTES # BLD AUTO: 0.03 K/UL (ref 0–0.04)
IMM GRANULOCYTES NFR BLD AUTO: 0.5 % (ref 0–0.5)
LYMPHOCYTES # BLD AUTO: 0.8 K/UL (ref 1–4.8)
LYMPHOCYTES NFR BLD: 13.9 % (ref 18–48)
MAGNESIUM SERPL-MCNC: 1.8 MG/DL (ref 1.6–2.6)
MCH RBC QN AUTO: 33.8 PG (ref 27–31)
MCHC RBC AUTO-ENTMCNC: 34.1 G/DL (ref 32–36)
MCV RBC AUTO: 99 FL (ref 82–98)
MONOCYTES # BLD AUTO: 0.7 K/UL (ref 0.3–1)
MONOCYTES NFR BLD: 13 % (ref 4–15)
NEUTROPHILS # BLD AUTO: 3.9 K/UL (ref 1.8–7.7)
NEUTROPHILS NFR BLD: 71.2 % (ref 38–73)
NRBC BLD-RTO: 0 /100 WBC
PLATELET # BLD AUTO: 80 K/UL (ref 150–350)
PMV BLD AUTO: 10.6 FL (ref 9.2–12.9)
POTASSIUM SERPL-SCNC: 3 MMOL/L (ref 3.5–5.1)
PROT SERPL-MCNC: 5.6 G/DL (ref 6–8.4)
PROT UR-MCNC: 25 MG/DL (ref 0–15)
PROT/CREAT UR: 1.39 MG/G{CREAT} (ref 0–0.2)
RBC # BLD AUTO: 3.4 M/UL (ref 4–5.4)
SODIUM SERPL-SCNC: 138 MMOL/L (ref 136–145)
SODIUM UR-SCNC: 85 MMOL/L (ref 20–250)
WBC # BLD AUTO: 5.48 K/UL (ref 3.9–12.7)

## 2019-04-04 PROCEDURE — 83735 ASSAY OF MAGNESIUM: CPT

## 2019-04-04 PROCEDURE — 99233 SBSQ HOSP IP/OBS HIGH 50: CPT | Mod: ,,, | Performed by: INTERNAL MEDICINE

## 2019-04-04 PROCEDURE — 25000003 PHARM REV CODE 250: Performed by: PHYSICIAN ASSISTANT

## 2019-04-04 PROCEDURE — 84300 ASSAY OF URINE SODIUM: CPT

## 2019-04-04 PROCEDURE — 99232 PR SUBSEQUENT HOSPITAL CARE,LEVL II: ICD-10-PCS | Mod: GC,,, | Performed by: INTERNAL MEDICINE

## 2019-04-04 PROCEDURE — 11000001 HC ACUTE MED/SURG PRIVATE ROOM

## 2019-04-04 PROCEDURE — 36415 COLL VENOUS BLD VENIPUNCTURE: CPT

## 2019-04-04 PROCEDURE — 84156 ASSAY OF PROTEIN URINE: CPT

## 2019-04-04 PROCEDURE — 83036 HEMOGLOBIN GLYCOSYLATED A1C: CPT

## 2019-04-04 PROCEDURE — 99233 PR SUBSEQUENT HOSPITAL CARE,LEVL III: ICD-10-PCS | Mod: ,,, | Performed by: INTERNAL MEDICINE

## 2019-04-04 PROCEDURE — 85025 COMPLETE CBC W/AUTO DIFF WBC: CPT

## 2019-04-04 PROCEDURE — 82550 ASSAY OF CK (CPK): CPT

## 2019-04-04 PROCEDURE — 63600175 PHARM REV CODE 636 W HCPCS: Performed by: INTERNAL MEDICINE

## 2019-04-04 PROCEDURE — 80053 COMPREHEN METABOLIC PANEL: CPT

## 2019-04-04 PROCEDURE — 99232 SBSQ HOSP IP/OBS MODERATE 35: CPT | Mod: GC,,, | Performed by: INTERNAL MEDICINE

## 2019-04-04 PROCEDURE — 25000003 PHARM REV CODE 250: Performed by: INTERNAL MEDICINE

## 2019-04-04 RX ORDER — MAGNESIUM SULFATE HEPTAHYDRATE 40 MG/ML
2 INJECTION, SOLUTION INTRAVENOUS ONCE
Status: COMPLETED | OUTPATIENT
Start: 2019-04-04 | End: 2019-04-04

## 2019-04-04 RX ORDER — POTASSIUM CHLORIDE 20 MEQ/15ML
40 SOLUTION ORAL ONCE
Status: COMPLETED | OUTPATIENT
Start: 2019-04-04 | End: 2019-04-04

## 2019-04-04 RX ADMIN — SODIUM CHLORIDE: 0.9 INJECTION, SOLUTION INTRAVENOUS at 12:04

## 2019-04-04 RX ADMIN — METOPROLOL TARTRATE 50 MG: 50 TABLET ORAL at 09:04

## 2019-04-04 RX ADMIN — METOPROLOL TARTRATE 50 MG: 50 TABLET ORAL at 08:04

## 2019-04-04 RX ADMIN — POTASSIUM CHLORIDE 40 MEQ: 20 SOLUTION ORAL at 09:04

## 2019-04-04 RX ADMIN — AMLODIPINE BESYLATE 2.5 MG: 2.5 TABLET ORAL at 09:04

## 2019-04-04 RX ADMIN — MAGNESIUM SULFATE IN WATER 2 G: 40 INJECTION, SOLUTION INTRAVENOUS at 09:04

## 2019-04-04 RX ADMIN — MIRTAZAPINE 15 MG: 15 TABLET, FILM COATED ORAL at 08:04

## 2019-04-04 RX ADMIN — RAMELTEON 8 MG: 8 TABLET, FILM COATED ORAL at 08:04

## 2019-04-04 NOTE — ASSESSMENT & PLAN NOTE
- Admission K 3.2, improved temporarily, back down to 3.0 today and given KCl  - Will follow and replace as needed

## 2019-04-04 NOTE — PLAN OF CARE
Problem: Adult Inpatient Plan of Care  Goal: Absence of Hospital-Acquired Illness or Injury    Intervention: Prevent VTE (venous thromboembolism)     04/04/19 0759   Prevent or Manage Embolism   VTE Prevention/Management ROM (active) performed;fluids promoted;dorsiflexion/plantar flexion performed;ambulation promoted

## 2019-04-04 NOTE — SUBJECTIVE & OBJECTIVE
Past Medical History:   Diagnosis Date    Abnormal liver enzymes 4/6/2015    Acute on chronic kidney disease, stage 3 4/18/2013    Alcohol-induced acute pancreatitis 8/16/2015    Anemia     Basal cell carcinoma 1985    nose    Bunion, right foot 12/14/2012    Compression fracture 11/14/2013    MI (myocardial infarction) 1991             SCC (squamous cell carcinoma) 05/11/2016    in situ left lower lip, nasal bridge       Past Surgical History:   Procedure Laterality Date    blephroplasty      BREAST BIOPSY Right     excisional 1985    BUNIONECTOMY  Jan 2013    right foot    CARDIAC CATHETERIZATION      COLONOSCOPY N/A 12/21/2016    Performed by [facility]oval [facility] at [facility] ENDO (4TH FLR)    COLONOSCOPY N/A 9/20/2016    Performed by Rachelle Guerra MD at [facility] ENDO (4TH FLR)    EGD (ESOPHAGOGASTRODUODENOSCOPY) N/A 7/19/2018    Performed by Jefferson Mina MD at [facility] ENDO (4TH FLR)    FOOT SURGERY      right foot    HYSTERECTOMY  1980s    bleeding    OPEN REDUCTION INTERNAL FIXATION-ORBITAL FRACTURE Right 1/23/2015    Performed by [facility]ur [facility] at [facility] OR 2ND FLR    OPEN REDUCTION INTERNAL FIXATION-ORBITAL FRACTURE Right 10/22/2014    Performed by Lon[facility] at [facility] OR 2ND FLR    orbital fx      REPAIR-ECTROPION Bilateral 3/29/2016    Performed by Lisa Gao MD at [facility] OR 1ST FLR    REPAIR-PTOSIS Bilateral 3/29/2016    Performed by Lisa Gao MD at [facility] OR 1ST FLR    TONSILLECTOMY      VAGINA SURGERY         Review of patient's allergies indicates:  No Known Allergies  Current Facility-Administered Medications   Medication Frequency    acetaminophen tablet 650 mg Q4H PRN    albuterol-ipratropium 2.5 mg-0.5 mg/3 mL nebulizer solution 3 mL Q4H PRN    amLODIPine tablet 2.5 mg Daily    bisacodyl suppository 10 mg Daily PRN    dextrose 50% injection 12.5 g PRN    dextrose 50% injection 25 g PRN    glucagon (human recombinant) injection 1 mg PRN    glucose  chewable tablet 16 g PRN    glucose chewable tablet 24 g PRN    heparin (porcine) injection 5,000 Units Q8H    metoprolol tartrate (LOPRESSOR) tablet 50 mg BID    mirtazapine tablet 15 mg QHS    ondansetron injection 8 mg Q8H PRN    promethazine (PHENERGAN) 6.25 mg in dextrose 5 % 50 mL IVPB Q6H PRN    ramelteon tablet 8 mg Nightly PRN    sodium chloride 0.9% flush 10 mL PRN     Family History     Problem Relation (Age of Onset)    Diabetes Mother    Heart disease Father (57)        Tobacco Use    Smoking status: Former Smoker     Packs/day: 1.00     Years: 15.00     Pack years: 15.00     Types: Cigarettes     Last attempt to quit: 4/6/2004     Years since quitting: 15.0    Smokeless tobacco: Never Used   Substance and Sexual Activity    Alcohol use: Yes     Alcohol/week: 3.0 - 6.0 oz     Types: 5 - 10 Standard drinks or equivalent per week     Comment: 1 glass scotch a day, last use last night    Drug use: No    Sexual activity: Never     Partners: Male     Birth control/protection: None     Review of Systems   Constitutional: Negative.    HENT: Negative.    Eyes: Negative.    Respiratory: Negative.    Cardiovascular: Negative.    Gastrointestinal: Negative.    Endocrine: Negative.    Genitourinary: Negative.    Musculoskeletal: Negative.    Skin: Negative.    Allergic/Immunologic: Negative.    Neurological: Negative.    Hematological: Negative.    Psychiatric/Behavioral: Negative.      Objective:     Vital Signs (Most Recent):  Temp: 98.8 °F (37.1 °C) (04/04/19 1611)  Pulse: 67 (04/04/19 1611)  Resp: 20 (04/04/19 1611)  BP: 139/71 (04/04/19 1611)  SpO2: (!) 92 % (04/04/19 1611)  O2 Device (Oxygen Therapy): room air (04/04/19 1611) Vital Signs (24h Range):  Temp:  [97.1 °F (36.2 °C)-99 °F (37.2 °C)] 98.8 °F (37.1 °C)  Pulse:  [61-80] 67  Resp:  [18-20] 20  SpO2:  [92 %-95 %] 92 %  BP: (128-140)/(64-85) 139/71     Weight: 58.4 kg (128 lb 12 oz) (04/04/19 0400)  Body mass index is 20.16 kg/m².  Body  surface area is 1.66 meters squared.    I/O last 3 completed shifts:  In: 2463.3 [P.O.:600; I.V.:863.3; IV Piggyback:1000]  Out: -     Physical Exam   Constitutional: She is oriented to person, place, and time. She appears well-developed and well-nourished. No distress.   HENT:   Head: Normocephalic.   No facial edema   Eyes: Pupils are equal, round, and reactive to light. Conjunctivae and EOM are normal.   Neck: Normal range of motion. Neck supple.   Cardiovascular: Normal rate, regular rhythm, normal heart sounds and intact distal pulses.   No murmur heard.  Pulmonary/Chest: Effort normal and breath sounds normal. She has no rales.   Abdominal: Soft. She exhibits no distension and no mass. There is no tenderness. There is no rebound.   Increased bowel sounds in 4 quadrants   Musculoskeletal: She exhibits edema.   +pitting edema on both ankles   Neurological: She is alert and oriented to person, place, and time.   Skin: Skin is warm. Capillary refill takes less than 2 seconds.   Psychiatric: She has a normal mood and affect. Her behavior is normal. Judgment and thought content normal.     Significant Labs:  BMP:   Recent Labs   Lab 04/04/19  0535      CL 96   CO2 28   BUN 43*   CREATININE 4.7*   CALCIUM 8.3*   MG 1.8     CBC:   Recent Labs   Lab 04/04/19  0535   WBC 5.48   RBC 3.40*   HGB 11.5*   HCT 33.7*   PLT 80*   MCV 99*   MCH 33.8*   MCHC 34.1     CMP:   Recent Labs   Lab 04/04/19  0535      CALCIUM 8.3*   ALBUMIN 3.2*   PROT 5.6*      K 3.0*   CO2 28   CL 96   BUN 43*   CREATININE 4.7*   ALKPHOS 65   ALT 39   AST 85*   BILITOT 0.8     Recent Labs   Lab 04/03/19  0928   COLORU Yellow   SPECGRAV 1.005   PHUR 6.0   PROTEINUA 1+*   BACTERIA Occasional   NITRITE Negative   LEUKOCYTESUR Negative   HYALINECASTS 0     All labs within the past 24 hours have been reviewed.    Significant Imaging:  Procedure Component Value Units Date/Time   US Retroperitoneal Complete [[id number]] Resulted: 04/04/19  0407   Order Status: Completed Updated: 04/04/19 0439   Narrative:     EXAMINATION:  US RETROPERITONEAL COMPLETE    CLINICAL HISTORY:  SHYANNE, labs suggestive of intrinsic disease;    TECHNIQUE:  Ultrasound of the kidneys and urinary bladder was performed including color flow and Doppler evaluation of the kidneys.    COMPARISON:  11/16/2018.    FINDINGS:  Right kidney: The right kidney measures 11.1 cm. No cortical thinning. No loss of corticomedullary distinction. Resistive index measures 0.89.  No mass. No renal stone. No hydronephrosis.    Left kidney: The left kidney measures 10.7 cm. No cortical thinning. No loss of corticomedullary distinction. Resistive index measures 0.88.  No mass. No renal stone. No hydronephrosis.    The bladder is partially distended at the time of scanning and has an unremarkable appearance.   Impression:       Elevated bilateral renal arterial resistive indices which may represent medical renal disease.  No hydronephrosis.    Electronically signed by resident: Scot Cohen  Date: 04/04/2019  Time: 04:01    Electronically signed by: Kenny Benito MD  Date: 04/04/2019  Time: 04:07

## 2019-04-04 NOTE — NURSING
"Patient AAO x 4 and able to make needs known, denies pain.  Patient c/o R AC 20 g "it hurts".  Area tender to touch and nurse attempted to insert new PIV and unsuccessful, a fellow co worker unable to insert and another nurse was not successful.   IMV paged new order placed for PICC Team to insert PIV via US.  Patient made aware and will cont to monitor closely  Patient was escorted to US and returned to room.  "

## 2019-04-04 NOTE — SUBJECTIVE & OBJECTIVE
Interval History:     Ms. Favret feels better this morning, actually requesting that she be discharged soon. F/u labs concerning for worsening SHYANNE with creatinine 4.7. Nephrology consulted for assistance with intrinsic renal disease evaluation. Overnight she has had 5 unmeasured urine occurences, and one bowel movement.     Review of Systems   Constitutional: Positive for activity change and appetite change. Negative for chills and fever.   HENT: Positive for congestion.    Respiratory: Positive for cough. Negative for shortness of breath.    Cardiovascular: Negative for chest pain and leg swelling.   Gastrointestinal: Negative for abdominal pain, constipation, diarrhea, nausea and vomiting.   Genitourinary: Negative for difficulty urinating and dysuria.   Musculoskeletal: Negative for arthralgias, back pain and myalgias.   Skin: Negative for rash and wound.   Neurological: Positive for weakness (diffuse). Negative for numbness.   Psychiatric/Behavioral: Negative for dysphoric mood. The patient is not nervous/anxious.      Objective:     Vital Signs (Most Recent):  Temp: 99 °F (37.2 °C) (04/04/19 1203)  Pulse: 61 (04/04/19 1203)  Resp: 20 (04/04/19 1203)  BP: 137/68 (04/04/19 1203)  SpO2: (!) 93 % (04/04/19 1203) Vital Signs (24h Range):  Temp:  [97.1 °F (36.2 °C)-99 °F (37.2 °C)] 99 °F (37.2 °C)  Pulse:  [61-80] 61  Resp:  [17-20] 20  SpO2:  [93 %-98 %] 93 %  BP: (128-142)/(63-85) 137/68     Weight: 58.4 kg (128 lb 12 oz)  Body mass index is 20.16 kg/m².    Intake/Output Summary (Last 24 hours) at 4/4/2019 1409  Last data filed at 4/4/2019 0400  Gross per 24 hour   Intake 1463.33 ml   Output --   Net 1463.33 ml      Physical Exam   Constitutional: She is oriented to person, place, and time. No distress.   Eyes: Pupils are equal, round, and reactive to light.   Cardiovascular: Normal rate and regular rhythm.   No murmur heard.  Pulmonary/Chest: Effort normal and breath sounds normal. No respiratory distress. She has  "no wheezes.   Abdominal: Soft. Bowel sounds are normal. She exhibits no distension. There is no tenderness.   Musculoskeletal: She exhibits no edema or tenderness.   Neurological: She is alert and oriented to person, place, and time. She displays normal reflexes. No cranial nerve deficit.   Skin: Skin is warm and dry. No rash noted. She is not diaphoretic. No erythema.   Psychiatric: She has a normal mood and affect. Her behavior is normal.       Significant Labs:     CBC:  Recent Labs   Lab 04/03/19  0931 04/04/19  0535   WBC 7.40 5.48   GRAN 73.3*  5.4 71.2  3.9   HGB 14.1 11.5*   HCT 40.9 33.7*   * 80*       Chem 10:  Recent Labs   Lab 04/03/19  0931 04/03/19  1523 04/04/19  0535    138 138   K 3.2* 3.5 3.0*   CL 88* 94* 96   CO2 29 26 28   BUN 47* 45* 43*   CREATININE 4.2* 4.1* 4.7*   GLU 83 94 107   CALCIUM 9.8 9.1 8.3*   MG 2.6  --  1.8       LFTs:  Recent Labs   Lab 04/03/19  0931 04/04/19  0535   ALKPHOS 88 65   BILITOT 0.8 0.8   * 85*   ALT 50* 39   ALBUMIN 3.8 3.2*       Significant Imaging:     US RP 4/4/2019:  "Elevated bilateral renal arterial resistive indices which may represent medical renal disease.  No hydronephrosis." - per interpreting radiologist  "

## 2019-04-04 NOTE — PLAN OF CARE
CM met with pt. and completed assessment. Pt. lives alone and was independent and active prior to admission. D/C plan: home with self care.  No needs identified.     Payor: [employer] MANAGED MEDICARE / Plan: [facility] MEDICARE / Product Type: Medicare Advantage /        [id number] - [place] - [address] [facility]  [id number]  [facility] [address]  Phone: [phone] Fax: [phone]      Kristina Orozco MD     Future Appointments   Date Time Provider Department Center   7/30/2019  7:30 AM Kristina Orzoco MD [facility] DO [place]          04/04/19 1021   Discharge Assessment   Assessment Type Discharge Planning Assessment   Confirmed/corrected address and phone number on facesheet? Yes   Assessment information obtained from? Patient   Expected Length of Stay (days) 3   Communicated expected length of stay with patient/caregiver yes   Prior to hospitilization cognitive status: Alert/Oriented   Prior to hospitalization functional status: Independent   Current cognitive status: Alert/Oriented   Current Functional Status: Independent   Lives With alone   Able to Return to Prior Arrangements yes   Is patient able to care for self after discharge? Yes   Who are your caregiver(s) and their phone number(s)? [facility] [phone]   Patient's perception of discharge disposition home or selfcare   Readmission Within the Last 30 Days no previous admission in last 30 days   Patient currently being followed by outpatient case management? No   Patient currently receives any other outside agency services? No   Equipment Currently Used at Home none   Do you have any problems affording any of your prescribed medications? No   Is the patient taking medications as prescribed? yes   Does the patient have transportation home? Yes   Transportation Anticipated family or friend will provide   Does the patient receive services at the [facility]? No   Discharge Plan A  Home;Home with family   Discharge Plan B Home   DME Needed Upon Discharge  none

## 2019-04-04 NOTE — PROGRESS NOTES
[facility] Hospital Medicine  Progress Note    Patient Name: Jacqueline O Favret  MRN: [id number]  Patient Class: IP- Inpatient   Admission Date: 4/3/2019  Length of Stay: 1 days  Attending Physician: Cristi Padilla MD  Primary Care Provider: Kristina Orozco MD    [facility] Medicine Team: [facility] HOSP MED V Cristi Padilla MD    Subjective:     Principal Problem:Acute renal failure superimposed on stage 3 chronic kidney disease    HPI:  Ms. Favret is a 73-year-old woman with HTN, HLD, CAD (MI in 1991), and CKD stage III (baseline creatinine ~1-1.2) who presents with nausea and malaise. Her symptoms started five days ago with increasing frequency of small solid bowel movements (> 5 daily), accompanied shortly thereafter by nausea and diminished appetite. Around that time she developed a dry cough and nasal congestion. No fevers, chills, or night sweats. No chest pain, shortness of breath, palpitations, or edema. She reports reduced UOP, though improved with fluids in the ED. No NSAID use. She does take ramipril.    Upon arrival to the ED she was hemodynamically stable. Lab workup significant for severe SHYANNE with creatinine 4.2, hypokalemia, and hypochloremia, along with modest transaminase elevation. Troponin was 0.048. A RUQ U/S was performed that revealed possible hepatic steatosis. She was given 2L of NS and Reglan with improvement in her symptoms.       Hospital Course:  4/3/2019: Admitted to [facility] with Dr. Padilla, flu PCR pending, SHYANNE workup underway, treating empirically for hypovolemia  4/4/2019: Symptomatically improved, SHYANNE worsening despite hydration. . Influenza negative. Nephrology consulted    Interval History:     Ms. Favret feels better this morning, actually requesting that she be discharged soon. F/u labs concerning for worsening SHYANNE with creatinine 4.7. Nephrology consulted for assistance with intrinsic renal disease evaluation. Overnight she has had 5 unmeasured urine  occurences, and one bowel movement.     Review of Systems   Constitutional: Positive for activity change and appetite change. Negative for chills and fever.   HENT: Positive for congestion.    Respiratory: Positive for cough. Negative for shortness of breath.    Cardiovascular: Negative for chest pain and leg swelling.   Gastrointestinal: Negative for abdominal pain, constipation, diarrhea, nausea and vomiting.   Genitourinary: Negative for difficulty urinating and dysuria.   Musculoskeletal: Negative for arthralgias, back pain and myalgias.   Skin: Negative for rash and wound.   Neurological: Positive for weakness (diffuse). Negative for numbness.   Psychiatric/Behavioral: Negative for dysphoric mood. The patient is not nervous/anxious.      Objective:     Vital Signs (Most Recent):  Temp: 99 °F (37.2 °C) (04/04/19 1203)  Pulse: 61 (04/04/19 1203)  Resp: 20 (04/04/19 1203)  BP: 137/68 (04/04/19 1203)  SpO2: (!) 93 % (04/04/19 1203) Vital Signs (24h Range):  Temp:  [97.1 °F (36.2 °C)-99 °F (37.2 °C)] 99 °F (37.2 °C)  Pulse:  [61-80] 61  Resp:  [17-20] 20  SpO2:  [93 %-98 %] 93 %  BP: (128-142)/(63-85) 137/68     Weight: 58.4 kg (128 lb 12 oz)  Body mass index is 20.16 kg/m².    Intake/Output Summary (Last 24 hours) at 4/4/2019 1409  Last data filed at 4/4/2019 0400  Gross per 24 hour   Intake 1463.33 ml   Output --   Net 1463.33 ml      Physical Exam   Constitutional: She is oriented to person, place, and time. No distress.   Eyes: Pupils are equal, round, and reactive to light.   Cardiovascular: Normal rate and regular rhythm.   No murmur heard.  Pulmonary/Chest: Effort normal and breath sounds normal. No respiratory distress. She has no wheezes.   Abdominal: Soft. Bowel sounds are normal. She exhibits no distension. There is no tenderness.   Musculoskeletal: She exhibits no edema or tenderness.   Neurological: She is alert and oriented to person, place, and time. She displays normal reflexes. No cranial nerve  "deficit.   Skin: Skin is warm and dry. No rash noted. She is not diaphoretic. No erythema.   Psychiatric: She has a normal mood and affect. Her behavior is normal.       Significant Labs:     CBC:  Recent Labs   Lab 04/03/19  0931 04/04/19  0535   WBC 7.40 5.48   GRAN 73.3*  5.4 71.2  3.9   HGB 14.1 11.5*   HCT 40.9 33.7*   * 80*       Chem 10:  Recent Labs   Lab 04/03/19  0931 04/03/19  1523 04/04/19  0535    138 138   K 3.2* 3.5 3.0*   CL 88* 94* 96   CO2 29 26 28   BUN 47* 45* 43*   CREATININE 4.2* 4.1* 4.7*   GLU 83 94 107   CALCIUM 9.8 9.1 8.3*   MG 2.6  --  1.8       LFTs:  Recent Labs   Lab 04/03/19  0931 04/04/19  0535   ALKPHOS 88 65   BILITOT 0.8 0.8   * 85*   ALT 50* 39   ALBUMIN 3.8 3.2*       Significant Imaging:     US RP 4/4/2019:  "Elevated bilateral renal arterial resistive indices which may represent medical renal disease.  No hydronephrosis." - per interpreting radiologist    Assessment/Plan:      * Acute renal failure superimposed on stage 3 chronic kidney disease    - Worsening, now up to 4.7  - Admission creatinine 4.2, above baseline of ~1.2  - Originally suspected pre-renal by history, now with no response to fluid and the above studies have broadened the differential - possibly ATN? Non-oliguric  - UA showing proteinuria, FEUrea suggestive of intrinsic renal disease  - Refused straight cath to evaluate for retention, but no hydronephrosis on retroperitoneal US  - Will renally dose all medications and avoid nephrotoxins including IV contrast agents  - Will follow with CMP, daily weights, and strict I/O measurement  - CPK ordered as add-on, essentially stable from yesterday. Hemoglobin A1c ordered as add-on given proteinuria, though her glucose has been fine this admission    Elevated transaminase level    - Improving  - AST and ALT elevated on arrival  - Not a new issue, but worse than prior  - RUQ US showing potential hepatic steatosis  - Will trend with hydration and " "hold rosuvastatin for an additional day    Hypokalemia    - Admission K 3.2, improved temporarily, back down to 3.0 today and given KCl  - Will follow and replace as needed    Adjustment disorder with depressed mood    - Stable  - Continue home mirtazepine    Coronary artery disease    - Stable with no signs or symptoms of ACS  - ECG stable, admission troponin mildly elevated but likely a consequence of her renal dysfunction. Will trend given demographics  - Otherwise holding rosuvastatin (elevated transaminases) and ramipril (elevated creatinine)  - Not on asa as an outpatient - follows actively with PCP. Most recent stress test in our system (from 2015) suggestive of "mild, diffuse, non-obstructive coronary atherosclerosis without clinically significant, localized perfusion defects" per interpreting cardiologist    Hyperlipidemia    - Holding home rosuvastatin given elevated transaminases  - Cardiac diet    Hypertension    - Stable, mildly elevated on admission  - Holding home BP medications in the setting of SHYANNE  - Will start hydralazine as a temporary measure if BP worsens, otherwise cardiac diet      VTE Risk Mitigation (From admission, onward)        Ordered     Place sequential compression device  Until discontinued      04/04/19 1408     heparin (porcine) injection 5,000 Units  Every 8 hours      04/03/19 1227     IP VTE HIGH RISK PATIENT  Once      04/03/19 1227              Cristi Padilla MD  Department of Hospital Medicine   [facility]  "

## 2019-04-04 NOTE — HPI
Mrs Jacqueline Favret is a 73 year old female patient with PMH of HTN, HLD, CKD3A (baseline Cr 1.2 -02/05/2019)  who came to the ER on 04/03/2019 due to increase in (solid, small) bowel movement frequency 5 days prior to admission. Associated with this, patient developed nausea, some nasal congestion and dry cough. She decreased PO intake significantly two days prior to admission due to feeling nauseous. She denies vomit, loose stools, abdominal pain or urinary symptoms.    In the ED she was found to be slightly hypertensive, other wise normal vital signs, she had dry mucous membranes, elevated serum Cr (4.1), BUN (45) and thrombocytopenia (135.000). She was given 2L of NS and antiemetic medications, which improved her symptoms.     Nephrology team was consulted for CKD3a with superimposed SHYANNE.

## 2019-04-04 NOTE — ASSESSMENT & PLAN NOTE
- Improving  - AST and ALT elevated on arrival  - Not a new issue, but worse than prior  - RUQ US showing potential hepatic steatosis  - Will trend with hydration and hold rosuvastatin for an additional day

## 2019-04-04 NOTE — ASSESSMENT & PLAN NOTE
"Patient with baseline S Cr 1.2 (feb 05 2019).   Now presents with S Cr 4.1.     -No urinary symptoms, no recent changes in urine output. History of 2-3 days of reduced food and water intake: max. 2 glasses of water per day due to nausea. No vomit, no loose stools. History positive for increased solid bowel movements for the last 5 days, prior to this, she had not eaten anything different or greasy or contaminated. No NSAID use, no prior ATB use. She does use omeprazole chronically.  -Physical exam on admission: dry mucous membranes.   -Physical exam today (after 2.4L IVF): MMM, 1+ pitting edema on ankles bilaterally, no crackles, no JVD.   -labs 04/03/2019: Cr 4.1 BUN: 45. FeUrea: 50.6%  UA: 1+prot. Occult blood: 2+. 2RBC, 1WBC. 0 hyaline casts.  -labs 04/04/2019: Cr 4.7, BUN: 43. FeNa: 16.1% (unrealiable, drawn after IVF resuscitation)  Urine sediment: some waxy casts indicative of tubular injury CKD, epithelial cells, cotton fibers, some WBCs (few).   -Renal US 04/03:  Right Kidney 11.1cm Resistive index: 0.89      Left kidney    10.7cm Resistive index: 0.88    History very compatible with prerenal failure due to poor PO intake in the last couple of days, however, FeUrea suggestive of intrinsic renal failure, which could be raise concern for ATN induced by prolonged renal hypoperfusion. No peripheral eosinophilia, eosinophiluria, rash or fever to think of AIN but patient has had significant exposure to PPI's prior to admission. In the setting of thromobocytopenia (135.000--> 80.000) would consider TMA as possible cause of intrinsic renal failure if kidneys fail to respond to IVF, in that case we would consider ordering studies for hemolysis (current Hb "drop" likely due to IVF resuscitation)    Plan:   -Continue NS 100cc/h IV.  -Please measure orthostatic vital signs.   -Daily RFP's  -No acute indication of dialysis as of now.  -Continue to hold PPI and any nephrotoxic agents.   -Strict I/O monitoring.  -F/u with " nephrology as an outpatient to study cause of hypokalemia if it persists after holding PPI's and resolution of increased bowel movements.   -We suggest the patient be studied as an outpatient by PCP for smoking history, as she has high serum bicarbonate and CXR suggestive of COPD (hyperinflation of both lungs, teardrop shaped heart, mild flattening of diaphragms) in the setting of smoking history. (Consider ABG in the future to assess for possible chronic respiratory acidosis with metabolic compensation).

## 2019-04-04 NOTE — ASSESSMENT & PLAN NOTE
- Worsening, now up to 4.7  - Admission creatinine 4.2, above baseline of ~1.2  - Originally suspected pre-renal by history, now with no response to fluid and the above studies have broadened the differential - possibly ATN? Non-oliguric  - UA showing proteinuria, FEUrea suggestive of intrinsic renal disease  - Refused straight cath to evaluate for retention, but no hydronephrosis on retroperitoneal US  - Will renally dose all medications and avoid nephrotoxins including IV contrast agents  - Will follow with CMP, daily weights, and strict I/O measurement  - CPK ordered as add-on, essentially stable from yesterday. Hemoglobin A1c ordered as add-on given proteinuria, though her glucose has been fine this admission

## 2019-04-04 NOTE — ASSESSMENT & PLAN NOTE
-Patient has taken omeprazole for years without knowing the exact indication. She does not remember having epigastric pain or gastritis. PPI's are associated with hypokalemia, we advised her to stop taking it at home. Since admission, PPI's have been held per primary team.   -For the last 5 days she has had increased bowel movements, this could have also led to hypokalemia.     Recommendations:  -continue to replace potassium cautiously due to SHYANNE.   -Monitor K

## 2019-04-05 ENCOUNTER — TELEPHONE (OUTPATIENT)
Dept: INTERNAL MEDICINE | Facility: CLINIC | Age: 74
End: 2019-04-05

## 2019-04-05 LAB
ALBUMIN SERPL BCP-MCNC: 3.3 G/DL (ref 3.5–5.2)
ALP SERPL-CCNC: 75 U/L (ref 55–135)
ALT SERPL W/O P-5'-P-CCNC: 36 U/L (ref 10–44)
ANION GAP SERPL CALC-SCNC: 10 MMOL/L (ref 8–16)
ANION GAP SERPL CALC-SCNC: 12 MMOL/L (ref 8–16)
ANION GAP SERPL CALC-SCNC: 12 MMOL/L (ref 8–16)
AST SERPL-CCNC: 66 U/L (ref 10–40)
BASOPHILS # BLD AUTO: 0.02 K/UL (ref 0–0.2)
BASOPHILS NFR BLD: 0.4 % (ref 0–1.9)
BILIRUB SERPL-MCNC: 0.8 MG/DL (ref 0.1–1)
BUN SERPL-MCNC: 33 MG/DL (ref 8–23)
BUN SERPL-MCNC: 33 MG/DL (ref 8–23)
BUN SERPL-MCNC: 36 MG/DL (ref 8–23)
C3 SERPL-MCNC: 111 MG/DL (ref 50–180)
C4 SERPL-MCNC: 27 MG/DL (ref 11–44)
CALCIUM SERPL-MCNC: 8.5 MG/DL (ref 8.7–10.5)
CALCIUM SERPL-MCNC: 8.7 MG/DL (ref 8.7–10.5)
CALCIUM SERPL-MCNC: 9.2 MG/DL (ref 8.7–10.5)
CHLORIDE SERPL-SCNC: 100 MMOL/L (ref 95–110)
CHLORIDE SERPL-SCNC: 97 MMOL/L (ref 95–110)
CHLORIDE SERPL-SCNC: 98 MMOL/L (ref 95–110)
CO2 SERPL-SCNC: 28 MMOL/L (ref 23–29)
CO2 SERPL-SCNC: 29 MMOL/L (ref 23–29)
CO2 SERPL-SCNC: 30 MMOL/L (ref 23–29)
CREAT SERPL-MCNC: 4.4 MG/DL (ref 0.5–1.4)
CREAT SERPL-MCNC: 4.5 MG/DL (ref 0.5–1.4)
CREAT SERPL-MCNC: 4.6 MG/DL (ref 0.5–1.4)
CRP SERPL-MCNC: 20.5 MG/L (ref 0–8.2)
DIFFERENTIAL METHOD: ABNORMAL
EOSINOPHIL # BLD AUTO: 0.1 K/UL (ref 0–0.5)
EOSINOPHIL NFR BLD: 1.6 % (ref 0–8)
ERYTHROCYTE [DISTWIDTH] IN BLOOD BY AUTOMATED COUNT: 12.6 % (ref 11.5–14.5)
EST. GFR  (AFRICAN AMERICAN): 10.3 ML/MIN/1.73 M^2
EST. GFR  (AFRICAN AMERICAN): 10.6 ML/MIN/1.73 M^2
EST. GFR  (AFRICAN AMERICAN): 10.8 ML/MIN/1.73 M^2
EST. GFR  (NON AFRICAN AMERICAN): 8.9 ML/MIN/1.73 M^2
EST. GFR  (NON AFRICAN AMERICAN): 9.2 ML/MIN/1.73 M^2
EST. GFR  (NON AFRICAN AMERICAN): 9.4 ML/MIN/1.73 M^2
GLUCOSE SERPL-MCNC: 105 MG/DL (ref 70–110)
GLUCOSE SERPL-MCNC: 140 MG/DL (ref 70–110)
GLUCOSE SERPL-MCNC: 143 MG/DL (ref 70–110)
HAPTOGLOB SERPL-MCNC: 166 MG/DL (ref 30–250)
HCT VFR BLD AUTO: 35.8 % (ref 37–48.5)
HGB BLD-MCNC: 11.9 G/DL (ref 12–16)
IMM GRANULOCYTES # BLD AUTO: 0.02 K/UL (ref 0–0.04)
IMM GRANULOCYTES NFR BLD AUTO: 0.4 % (ref 0–0.5)
LDH SERPL L TO P-CCNC: 357 U/L (ref 110–260)
LYMPHOCYTES # BLD AUTO: 0.9 K/UL (ref 1–4.8)
LYMPHOCYTES NFR BLD: 20 % (ref 18–48)
MAGNESIUM SERPL-MCNC: 2.2 MG/DL (ref 1.6–2.6)
MCH RBC QN AUTO: 33.6 PG (ref 27–31)
MCHC RBC AUTO-ENTMCNC: 33.2 G/DL (ref 32–36)
MCV RBC AUTO: 101 FL (ref 82–98)
MONOCYTES # BLD AUTO: 0.6 K/UL (ref 0.3–1)
MONOCYTES NFR BLD: 13.8 % (ref 4–15)
NEUTROPHILS # BLD AUTO: 2.9 K/UL (ref 1.8–7.7)
NEUTROPHILS NFR BLD: 63.8 % (ref 38–73)
NRBC BLD-RTO: 0 /100 WBC
PHOSPHATE SERPL-MCNC: 2.4 MG/DL (ref 2.7–4.5)
PLATELET # BLD AUTO: 71 K/UL (ref 150–350)
PMV BLD AUTO: 11 FL (ref 9.2–12.9)
POTASSIUM SERPL-SCNC: 2.7 MMOL/L (ref 3.5–5.1)
POTASSIUM SERPL-SCNC: 3.1 MMOL/L (ref 3.5–5.1)
POTASSIUM SERPL-SCNC: 3.7 MMOL/L (ref 3.5–5.1)
POTASSIUM UR-SCNC: 56 MMOL/L (ref 15–95)
PROT SERPL-MCNC: 5.9 G/DL (ref 6–8.4)
RBC # BLD AUTO: 3.54 M/UL (ref 4–5.4)
SODIUM SERPL-SCNC: 137 MMOL/L (ref 136–145)
SODIUM SERPL-SCNC: 139 MMOL/L (ref 136–145)
SODIUM SERPL-SCNC: 140 MMOL/L (ref 136–145)
SODIUM UR-SCNC: 71 MMOL/L (ref 20–250)
URATE SERPL-MCNC: 5 MG/DL (ref 2.4–5.7)
WBC # BLD AUTO: 4.49 K/UL (ref 3.9–12.7)

## 2019-04-05 PROCEDURE — 86160 COMPLEMENT ANTIGEN: CPT | Mod: 59

## 2019-04-05 PROCEDURE — 36415 COLL VENOUS BLD VENIPUNCTURE: CPT

## 2019-04-05 PROCEDURE — 84550 ASSAY OF BLOOD/URIC ACID: CPT

## 2019-04-05 PROCEDURE — 83615 LACTATE (LD) (LDH) ENZYME: CPT

## 2019-04-05 PROCEDURE — 84133 ASSAY OF URINE POTASSIUM: CPT

## 2019-04-05 PROCEDURE — 80053 COMPREHEN METABOLIC PANEL: CPT

## 2019-04-05 PROCEDURE — 86225 DNA ANTIBODY NATIVE: CPT

## 2019-04-05 PROCEDURE — 86255 FLUORESCENT ANTIBODY SCREEN: CPT | Mod: 91

## 2019-04-05 PROCEDURE — 86140 C-REACTIVE PROTEIN: CPT

## 2019-04-05 PROCEDURE — 99233 SBSQ HOSP IP/OBS HIGH 50: CPT | Mod: ,,, | Performed by: INTERNAL MEDICINE

## 2019-04-05 PROCEDURE — 84300 ASSAY OF URINE SODIUM: CPT

## 2019-04-05 PROCEDURE — 83735 ASSAY OF MAGNESIUM: CPT

## 2019-04-05 PROCEDURE — 99232 PR SUBSEQUENT HOSPITAL CARE,LEVL II: ICD-10-PCS | Mod: GC,,, | Performed by: INTERNAL MEDICINE

## 2019-04-05 PROCEDURE — 80048 BASIC METABOLIC PNL TOTAL CA: CPT

## 2019-04-05 PROCEDURE — 83010 ASSAY OF HAPTOGLOBIN QUANT: CPT

## 2019-04-05 PROCEDURE — 99232 SBSQ HOSP IP/OBS MODERATE 35: CPT | Mod: GC,,, | Performed by: INTERNAL MEDICINE

## 2019-04-05 PROCEDURE — 25000003 PHARM REV CODE 250: Performed by: INTERNAL MEDICINE

## 2019-04-05 PROCEDURE — 85025 COMPLETE CBC W/AUTO DIFF WBC: CPT

## 2019-04-05 PROCEDURE — 11000001 HC ACUTE MED/SURG PRIVATE ROOM

## 2019-04-05 PROCEDURE — 63600175 PHARM REV CODE 636 W HCPCS: Performed by: INTERNAL MEDICINE

## 2019-04-05 PROCEDURE — 86038 ANTINUCLEAR ANTIBODIES: CPT

## 2019-04-05 PROCEDURE — 80074 ACUTE HEPATITIS PANEL: CPT

## 2019-04-05 PROCEDURE — 86160 COMPLEMENT ANTIGEN: CPT

## 2019-04-05 PROCEDURE — 84100 ASSAY OF PHOSPHORUS: CPT

## 2019-04-05 PROCEDURE — 86039 ANTINUCLEAR ANTIBODIES (ANA): CPT

## 2019-04-05 PROCEDURE — 86235 NUCLEAR ANTIGEN ANTIBODY: CPT | Mod: 59

## 2019-04-05 PROCEDURE — 99233 PR SUBSEQUENT HOSPITAL CARE,LEVL III: ICD-10-PCS | Mod: ,,, | Performed by: INTERNAL MEDICINE

## 2019-04-05 RX ORDER — HYDROXYZINE HYDROCHLORIDE 25 MG/1
25 TABLET, FILM COATED ORAL NIGHTLY PRN
Status: DISCONTINUED | OUTPATIENT
Start: 2019-04-05 | End: 2019-04-05

## 2019-04-05 RX ORDER — ROSUVASTATIN CALCIUM 20 MG/1
20 TABLET, COATED ORAL DAILY
Status: DISCONTINUED | OUTPATIENT
Start: 2019-04-06 | End: 2019-04-06 | Stop reason: HOSPADM

## 2019-04-05 RX ORDER — POTASSIUM CHLORIDE 7.45 MG/ML
10 INJECTION INTRAVENOUS
Status: DISCONTINUED | OUTPATIENT
Start: 2019-04-05 | End: 2019-04-05

## 2019-04-05 RX ORDER — ALPRAZOLAM 0.25 MG/1
0.25 TABLET ORAL NIGHTLY PRN
Status: DISCONTINUED | OUTPATIENT
Start: 2019-04-05 | End: 2019-04-06 | Stop reason: HOSPADM

## 2019-04-05 RX ORDER — SODIUM CHLORIDE 9 MG/ML
INJECTION, SOLUTION INTRAVENOUS CONTINUOUS
Status: DISCONTINUED | OUTPATIENT
Start: 2019-04-05 | End: 2019-04-05

## 2019-04-05 RX ORDER — POTASSIUM CHLORIDE 20 MEQ/15ML
20 SOLUTION ORAL ONCE
Status: COMPLETED | OUTPATIENT
Start: 2019-04-05 | End: 2019-04-05

## 2019-04-05 RX ORDER — SODIUM CHLORIDE 9 MG/ML
INJECTION, SOLUTION INTRAVENOUS CONTINUOUS
Status: DISCONTINUED | OUTPATIENT
Start: 2019-04-05 | End: 2019-04-06 | Stop reason: HOSPADM

## 2019-04-05 RX ORDER — POTASSIUM CHLORIDE 20 MEQ/15ML
40 SOLUTION ORAL ONCE
Status: COMPLETED | OUTPATIENT
Start: 2019-04-05 | End: 2019-04-05

## 2019-04-05 RX ADMIN — SODIUM CHLORIDE: 0.9 INJECTION, SOLUTION INTRAVENOUS at 03:04

## 2019-04-05 RX ADMIN — POTASSIUM CHLORIDE 10 MEQ: 10 INJECTION, SOLUTION INTRAVENOUS at 11:04

## 2019-04-05 RX ADMIN — POTASSIUM CHLORIDE 40 MEQ: 20 SOLUTION ORAL at 02:04

## 2019-04-05 RX ADMIN — ALPRAZOLAM 0.25 MG: 0.25 TABLET ORAL at 08:04

## 2019-04-05 RX ADMIN — SODIUM CHLORIDE: 0.9 INJECTION, SOLUTION INTRAVENOUS at 08:04

## 2019-04-05 RX ADMIN — METOPROLOL TARTRATE 50 MG: 50 TABLET ORAL at 07:04

## 2019-04-05 RX ADMIN — POTASSIUM CHLORIDE 10 MEQ: 10 INJECTION, SOLUTION INTRAVENOUS at 09:04

## 2019-04-05 RX ADMIN — POTASSIUM CHLORIDE 20 MEQ: 20 SOLUTION ORAL at 09:04

## 2019-04-05 RX ADMIN — METOPROLOL TARTRATE 50 MG: 50 TABLET ORAL at 08:04

## 2019-04-05 RX ADMIN — AMLODIPINE BESYLATE 2.5 MG: 2.5 TABLET ORAL at 07:04

## 2019-04-05 NOTE — CARE UPDATE
AM K 2.7, magnesium normal    Ordered replacement (IV + PO) for a total of 60 mEq with repeat BMP upon completion of infusion. Tele ordered in the interim    I suspect she will need more based on prior trends (anticipate adding K to continuous infusion) but am replacing cautiously given SHYANNE, more details to follow in daily progress note

## 2019-04-05 NOTE — TELEPHONE ENCOUNTER
----- Message from Luanshiloh Ray sent at 4/5/2019  1:49 PM CDT -----  Contact: self/[phone]  Pt is calling to speak with someone in the office in regards to a urgent matter. Pt states that she needs to speak with someone immediately. Pt states that she's in the ER for lo potassium and she needs to know when a particular medication was prescribed to her. Please call and advise.          Thank You

## 2019-04-05 NOTE — CONSULTS
[facility]  Nephrology  Consult Note    Patient Name: Jacqueline O Favret  MRN: [id number]  Admission Date: 4/3/2019  Hospital Length of Stay: 1 days  Attending Provider: Cristi Padilla MD   Primary Care Physician: Kristina Orozco MD  Principal Problem:Acute renal failure superimposed on stage 3 chronic kidney disease    Inpatient consult to Nephrology  Consult performed by: Dejuan Beasley MD  Consult ordered by: Cristi Padilla MD        Subjective:     HPI: Mrs Jacqueline Favret is a 73 year old female patient with PMH of HTN, HLD, CKD3A (baseline Cr 1.2 -02/05/2019)  who came to the ER on 04/03/2019 due to increase in (solid, small) bowel movement frequency 5 days prior to admission. Associated with this, patient developed nausea, some nasal congestion and dry cough. She decreased PO intake significantly two days prior to admission due to feeling nauseous. She denies vomit, loose stools, abdominal pain or urinary symptoms.    In the ED she was found to be slightly hypertensive, other wise normal vital signs, she had dry mucous membranes, elevated serum Cr (4.1), BUN (45) and thrombocytopenia (135.000). She was given 2L of NS and antiemetic medications, which improved her symptoms.     Nephrology team was consulted for CKD3a with superimposed SHYANNE.     Past Medical History:   Diagnosis Date    Abnormal liver enzymes 4/6/2015    Acute on chronic kidney disease, stage 3 4/18/2013    Alcohol-induced acute pancreatitis 8/16/2015    Anemia     Basal cell carcinoma 1985    nose    Bunion, right foot 12/14/2012    Compression fracture 11/14/2013    MI (myocardial infarction) 1991             SCC (squamous cell carcinoma) 05/11/2016    in situ left lower lip, nasal bridge       Past Surgical History:   Procedure Laterality Date    blephroplasty      BREAST BIOPSY Right     excisional 1985    BUNIONECTOMY  Jan 2013    right foot    CARDIAC CATHETERIZATION      COLONOSCOPY N/A  12/21/2016    Performed by Freedom Sandoval MD at [facility] (4TH FLR)    COLONOSCOPY N/A 9/20/2016    Performed by Rachelle Guerra MD at [facility] (4TH FLR)    EGD (ESOPHAGOGASTRODUODENOSCOPY) N/A 7/19/2018    Performed by Jefferson Mina MD at [facility] (4TH FLR)    FOOT SURGERY      right foot    HYSTERECTOMY  1980s    bleeding    OPEN REDUCTION INTERNAL FIXATION-ORBITAL FRACTURE Right 1/23/2015    Performed by Lonny Thakur MD at [facility] OR 2ND FLR    OPEN REDUCTION INTERNAL FIXATION-ORBITAL FRACTURE Right 10/22/2014    Performed by Lonny Thakur MD at [facility] 2ND FLR    orbital fx      REPAIR-ECTROPION Bilateral 3/29/2016    Performed by Lisa Gao MD at [facility] OR 1ST FLR    REPAIR-PTOSIS Bilateral 3/29/2016    Performed by Lisa Gao MD at [facility] OR 1ST FLR    TONSILLECTOMY      VAGINA SURGERY         Review of patient's allergies indicates:  No Known Allergies  Current Facility-Administered Medications   Medication Frequency    acetaminophen tablet 650 mg Q4H PRN    albuterol-ipratropium 2.5 mg-0.5 mg/3 mL nebulizer solution 3 mL Q4H PRN    amLODIPine tablet 2.5 mg Daily    bisacodyl suppository 10 mg Daily PRN    dextrose 50% injection 12.5 g PRN    dextrose 50% injection 25 g PRN    glucagon (human recombinant) injection 1 mg PRN    glucose chewable tablet 16 g PRN    glucose chewable tablet 24 g PRN    heparin (porcine) injection 5,000 Units Q8H    metoprolol tartrate (LOPRESSOR) tablet 50 mg BID    mirtazapine tablet 15 mg QHS    ondansetron injection 8 mg Q8H PRN    promethazine (PHENERGAN) 6.25 mg in dextrose 5 % 50 mL IVPB Q6H PRN    ramelteon tablet 8 mg Nightly PRN    sodium chloride 0.9% flush 10 mL PRN     Family History     Problem Relation (Age of Onset)    Diabetes Mother    Heart disease Father (57)        Tobacco Use    Smoking status: Former Smoker     Packs/day: 1.00     Years: 15.00     Pack years: 15.00     Types: Cigarettes     Last attempt to  quit: 4/6/2004     Years since quitting: 15.0    Smokeless tobacco: Never Used   Substance and Sexual Activity    Alcohol use: Yes     Alcohol/week: 3.0 - 6.0 oz     Types: 5 - 10 Standard drinks or equivalent per week     Comment: 1 glass scotch a day, last use last night    Drug use: No    Sexual activity: Never     Partners: Male     Birth control/protection: None     Review of Systems   Constitutional: Negative.    HENT: Negative.    Eyes: Negative.    Respiratory: Negative.    Cardiovascular: Negative.    Gastrointestinal: Negative.    Endocrine: Negative.    Genitourinary: Negative.    Musculoskeletal: Negative.    Skin: Negative.    Allergic/Immunologic: Negative.    Neurological: Negative.    Hematological: Negative.    Psychiatric/Behavioral: Negative.      Objective:     Vital Signs (Most Recent):  Temp: 98.8 °F (37.1 °C) (04/04/19 1611)  Pulse: 67 (04/04/19 1611)  Resp: 20 (04/04/19 1611)  BP: 139/71 (04/04/19 1611)  SpO2: (!) 92 % (04/04/19 1611)  O2 Device (Oxygen Therapy): room air (04/04/19 1611) Vital Signs (24h Range):  Temp:  [97.1 °F (36.2 °C)-99 °F (37.2 °C)] 98.8 °F (37.1 °C)  Pulse:  [61-80] 67  Resp:  [18-20] 20  SpO2:  [92 %-95 %] 92 %  BP: (128-140)/(64-85) 139/71     Weight: 58.4 kg (128 lb 12 oz) (04/04/19 0400)  Body mass index is 20.16 kg/m².  Body surface area is 1.66 meters squared.    I/O last 3 completed shifts:  In: 2463.3 [P.O.:600; I.V.:863.3; IV Piggyback:1000]  Out: -     Physical Exam   Constitutional: She is oriented to person, place, and time. She appears well-developed and well-nourished. No distress.   HENT:   Head: Normocephalic.   No facial edema   Eyes: Pupils are equal, round, and reactive to light. Conjunctivae and EOM are normal.   Neck: Normal range of motion. Neck supple.   Cardiovascular: Normal rate, regular rhythm, normal heart sounds and intact distal pulses.   No murmur heard.  Pulmonary/Chest: Effort normal and breath sounds normal. She has no rales.    Abdominal: Soft. She exhibits no distension and no mass. There is no tenderness. There is no rebound.   Increased bowel sounds in 4 quadrants   Musculoskeletal: She exhibits edema.   +pitting edema on both ankles   Neurological: She is alert and oriented to person, place, and time.   Skin: Skin is warm. Capillary refill takes less than 2 seconds.   Psychiatric: She has a normal mood and affect. Her behavior is normal. Judgment and thought content normal.     Significant Labs:  BMP:   Recent Labs   Lab 04/04/19  0535      CL 96   CO2 28   BUN 43*   CREATININE 4.7*   CALCIUM 8.3*   MG 1.8     CBC:   Recent Labs   Lab 04/04/19  0535   WBC 5.48   RBC 3.40*   HGB 11.5*   HCT 33.7*   PLT 80*   MCV 99*   MCH 33.8*   MCHC 34.1     CMP:   Recent Labs   Lab 04/04/19  0535      CALCIUM 8.3*   ALBUMIN 3.2*   PROT 5.6*      K 3.0*   CO2 28   CL 96   BUN 43*   CREATININE 4.7*   ALKPHOS 65   ALT 39   AST 85*   BILITOT 0.8     Recent Labs   Lab 04/03/19  0928   COLORU Yellow   SPECGRAV 1.005   PHUR 6.0   PROTEINUA 1+*   BACTERIA Occasional   NITRITE Negative   LEUKOCYTESUR Negative   HYALINECASTS 0     All labs within the past 24 hours have been reviewed.    Significant Imaging:  Procedure Component Value Units Date/Time   US Retroperitoneal Complete [[id number]] Resulted: 04/04/19 0407   Order Status: Completed Updated: 04/04/19 0439   Narrative:     EXAMINATION:  US RETROPERITONEAL COMPLETE    CLINICAL HISTORY:  SHYANNE, labs suggestive of intrinsic disease;    TECHNIQUE:  Ultrasound of the kidneys and urinary bladder was performed including color flow and Doppler evaluation of the kidneys.    COMPARISON:  11/16/2018.    FINDINGS:  Right kidney: The right kidney measures 11.1 cm. No cortical thinning. No loss of corticomedullary distinction. Resistive index measures 0.89.  No mass. No renal stone. No hydronephrosis.    Left kidney: The left kidney measures 10.7 cm. No cortical thinning. No loss of  corticomedullary distinction. Resistive index measures 0.88.  No mass. No renal stone. No hydronephrosis.    The bladder is partially distended at the time of scanning and has an unremarkable appearance.   Impression:       Elevated bilateral renal arterial resistive indices which may represent medical renal disease.  No hydronephrosis.    Electronically signed by resident: Scot Cohen  Date: 04/04/2019  Time: 04:01    Electronically signed by: Kenny Benito MD  Date: 04/04/2019  Time: 04:07     Assessment/Plan:     * Acute renal failure superimposed on stage 3 chronic kidney disease  Patient with baseline S Cr 1.2 (feb 05 2019).   Now presents with S Cr 4.1.     -No urinary symptoms, no recent changes in urine output. History of 2-3 days of reduced food and water intake: max. 2 glasses of water per day due to nausea. No vomit, no loose stools. History positive for increased solid bowel movements for the last 5 days, prior to this, she had not eaten anything different or greasy or contaminated. No NSAID use, no prior ATB use. She does use omeprazole chronically.  -Physical exam on admission: dry mucous membranes.   -Physical exam today (after 2.4L IVF): MMM, 1+ pitting edema on ankles bilaterally, no crackles, no JVD.   -labs 04/03/2019: Cr 4.1 BUN: 45. FeUrea: 50.6%  UA: 1+prot. Occult blood: 2+. 2RBC, 1WBC. 0 hyaline casts.  -labs 04/04/2019: Cr 4.7, BUN: 43. FeNa: 16.1% (unrealiable, drawn after IVF resuscitation)  Urine sediment: some waxy casts indicative of tubular injury CKD, epithelial cells, cotton fibers, some WBCs (few).   -Renal US 04/03:  Right Kidney 11.1cm Resistive index: 0.89      Left kidney    10.7cm Resistive index: 0.88    History very compatible with prerenal failure due to poor PO intake in the last couple of days, however, FeUrea suggestive of intrinsic renal failure, which could be raise concern for ATN induced by prolonged renal hypoperfusion. No peripheral eosinophilia, eosinophiluria,  "rash or fever to think of AIN but patient has had significant exposure to PPI's prior to admission. In the setting of thromobocytopenia (135.000--> 80.000) would consider TMA as possible cause of intrinsic renal failure if kidneys fail to respond to IVF, in that case we would consider ordering studies for hemolysis (current Hb "drop" likely due to IVF resuscitation)    Plan:   -Continue NS 100cc/h IV.  -Please measure orthostatic vital signs.   -Daily RFP's  -No acute indication of dialysis as of now.  -Continue to hold PPI and any nephrotoxic agents.   -Strict I/O monitoring.  -F/u with nephrology as an outpatient to study cause of hypokalemia if it persists after holding PPI's and resolution of increased bowel movements.   -We suggest the patient be studied as an outpatient by PCP for smoking history, as she has high serum bicarbonate and CXR suggestive of COPD (hyperinflation of both lungs, teardrop shaped heart, mild flattening of diaphragms) in the setting of smoking history. (Consider ABG in the future to assess for possible chronic respiratory acidosis with metabolic compensation).     Hypokalemia  -Patient has taken omeprazole for years without knowing the exact indication. She does not remember having epigastric pain or gastritis. PPI's are associated with hypokalemia, we advised her to stop taking it at home. Since admission, PPI's have been held per primary team.   -For the last 5 days she has had increased bowel movements, this could have also led to hypokalemia.     Recommendations:  -continue to replace potassium cautiously due to SHYANNE.   -Monitor K      Thank you for your consult. I will follow-up with patient. Please contact us if you have any additional questions.    Dejuan Beasley MD  Nephrology  [facility]  "

## 2019-04-05 NOTE — ASSESSMENT & PLAN NOTE
- Improving  - AST and ALT elevated on arrival  - Not a new issue, but worse than prior  - RUQ US showing potential hepatic steatosis - suspect this + dehydration as the etiology  - Resumed home rosuvastatin

## 2019-04-05 NOTE — SUBJECTIVE & OBJECTIVE
Interval History:     Ms. Favret continues to feel well, again requesting that she leave today. Her renal function is only marginally improved, and she has worsening hypokalemia today and was unable to tolerate IV replacement. Nephrology following     Review of Systems   Constitutional: Positive for activity change and appetite change. Negative for chills and fever.   HENT: Positive for congestion.    Respiratory: Positive for cough. Negative for shortness of breath.    Cardiovascular: Negative for chest pain and leg swelling.   Gastrointestinal: Negative for abdominal pain, constipation, diarrhea, nausea and vomiting.   Genitourinary: Negative for difficulty urinating and dysuria.   Musculoskeletal: Negative for arthralgias, back pain and myalgias.   Skin: Negative for rash and wound.   Neurological: Positive for weakness (diffuse). Negative for numbness.   Psychiatric/Behavioral: Negative for dysphoric mood. The patient is not nervous/anxious.      Objective:     Vital Signs (Most Recent):  Temp: 97.9 °F (36.6 °C) (04/05/19 1741)  Pulse: 81 (04/05/19 1741)  Resp: 18 (04/05/19 1741)  BP: (!) 149/75 (04/05/19 1741)  SpO2: 96 % (04/05/19 1741) Vital Signs (24h Range):  Temp:  [97.9 °F (36.6 °C)-99.5 °F (37.5 °C)] 97.9 °F (36.6 °C)  Pulse:  [58-81] 81  Resp:  [16-20] 18  SpO2:  [92 %-96 %] 96 %  BP: (136-155)/(63-77) 149/75     Weight: 58.4 kg (128 lb 12 oz)  Body mass index is 20.16 kg/m².    Intake/Output Summary (Last 24 hours) at 4/5/2019 1847  Last data filed at 4/5/2019 1540  Gross per 24 hour   Intake 1460 ml   Output 750 ml   Net 710 ml      Physical Exam   Constitutional: She is oriented to person, place, and time. No distress.   Eyes: Pupils are equal, round, and reactive to light.   Cardiovascular: Normal rate and regular rhythm.   No murmur heard.  Pulmonary/Chest: Effort normal and breath sounds normal. No respiratory distress. She has no wheezes.   Abdominal: Soft. Bowel sounds are normal. She exhibits  "no distension. There is no tenderness.   Musculoskeletal: She exhibits no edema or tenderness.   Neurological: She is alert and oriented to person, place, and time. She displays normal reflexes. No cranial nerve deficit.   Skin: Skin is warm and dry. No rash noted. She is not diaphoretic. No erythema.   Psychiatric: She has a normal mood and affect. Her behavior is normal.       Significant Labs:     CBC:    Recent Labs   Lab 04/04/19 0535 04/05/19  0340   WBC 5.48 4.49   GRAN 71.2  3.9 63.8  2.9   HGB 11.5* 11.9*   HCT 33.7* 35.8*   PLT 80* 71*       Chem 10:  Recent Labs   Lab 04/04/19  0535 04/05/19  0340 04/05/19  1325 04/05/19  1519    139 137  --    K 3.0* 2.7* 3.1*  --    CL 96 98 97  --    CO2 28 29 30*  --    BUN 43* 36* 33*  --    CREATININE 4.7* 4.6* 4.4*  --     105 143*  --    CALCIUM 8.3* 8.5* 8.7  --    MG 1.8 2.2  --   --    PHOS  --   --   --  2.4*       LFTs:  Recent Labs   Lab 04/04/19 0535 04/05/19  0340   ALKPHOS 65 75   BILITOT 0.8 0.8   AST 85* 66*   ALT 39 36   ALBUMIN 3.2* 3.3*       Significant Imaging:     US RP 4/4/2019:  "Elevated bilateral renal arterial resistive indices which may represent medical renal disease.  No hydronephrosis." - per interpreting radiologist  "

## 2019-04-05 NOTE — ASSESSMENT & PLAN NOTE
- Worsened today down to 2.7  - Improved with supplementation - originally ordered IV + PO, but she did not tolerated the IV. She greatly prefers liquid > tablet formations  - Repeat up to 3.7, discontinued tele

## 2019-04-05 NOTE — ASSESSMENT & PLAN NOTE
- Stable, intermittent mild elevations  - Holding home ACE in the setting of SHYANNE. Continue amlodipine - may increase dose if persistently elevated

## 2019-04-05 NOTE — TELEPHONE ENCOUNTER
Patient is wanting to know when she first started to take the Rx Omeprazole      Please Advise  Thank You

## 2019-04-05 NOTE — ASSESSMENT & PLAN NOTE
- Marginal improvement, down to 4.4 this morning  - Admission creatinine 4.2, above baseline of ~1.2  - Originally suspected pre-renal by history, subsequent evaluation (FEUrea, US) suggestive of an intrinsic process  - Nephrology following, concern for ATN vs. AIN vs. TMA (lower on the differential - labs not suggestive of hemolysis with hypokalemia, normal total bili and haptoglobin, though LDH is high)  - Will renally dose all medications and avoid nephrotoxins including IV contrast agents  - Will follow with CMP, daily weights, and strict I/O measurement  - Continue empiric hydration with NS. Bowel movement frequency normalized.

## 2019-04-05 NOTE — SUBJECTIVE & OBJECTIVE
Interval History: Patient currently asymptomatic. Bowel movements back to normal (1 per day).    Review of patient's allergies indicates:  No Known Allergies  Current Facility-Administered Medications   Medication Frequency    0.9%  NaCl infusion Continuous    acetaminophen tablet 650 mg Q4H PRN    albuterol-ipratropium 2.5 mg-0.5 mg/3 mL nebulizer solution 3 mL Q4H PRN    amLODIPine tablet 2.5 mg Daily    bisacodyl suppository 10 mg Daily PRN    dextrose 50% injection 12.5 g PRN    dextrose 50% injection 25 g PRN    glucagon (human recombinant) injection 1 mg PRN    glucose chewable tablet 16 g PRN    glucose chewable tablet 24 g PRN    heparin (porcine) injection 5,000 Units Q8H    hydrOXYzine HCl tablet 25 mg Nightly PRN    metoprolol tartrate (LOPRESSOR) tablet 50 mg BID    mirtazapine tablet 15 mg QHS    ondansetron injection 8 mg Q8H PRN    promethazine (PHENERGAN) 6.25 mg in dextrose 5 % 50 mL IVPB Q6H PRN    ramelteon tablet 8 mg Nightly PRN    sodium chloride 0.9% flush 10 mL PRN       Objective:     Vital Signs (Most Recent):  Temp: 99.5 °F (37.5 °C) (04/05/19 1310)  Pulse: 74 (04/05/19 1310)  Resp: 20 (04/05/19 1310)  BP: (!) 144/74 (04/05/19 1310)  SpO2: (!) 94 % (04/05/19 1310)  O2 Device (Oxygen Therapy): room air (04/05/19 1310) Vital Signs (24h Range):  Temp:  [98.6 °F (37 °C)-99.5 °F (37.5 °C)] 99.5 °F (37.5 °C)  Pulse:  [58-79] 74  Resp:  [16-20] 20  SpO2:  [92 %-96 %] 94 %  BP: (136-155)/(63-77) 144/74     Weight: 58.4 kg (128 lb 12 oz) (04/04/19 0400)  Body mass index is 20.16 kg/m².  Body surface area is 1.66 meters squared.    I/O last 3 completed shifts:  In: 3228.3 [P.O.:600; I.V.:2628.3]  Out: -     Physical Exam   Constitutional: She is oriented to person, place, and time. She appears well-developed and well-nourished. No distress.   HENT:   Head: Normocephalic and atraumatic.   Eyes: Pupils are equal, round, and reactive to light. Conjunctivae and EOM are normal.   Neck:  Normal range of motion.   Cardiovascular: Normal rate, regular rhythm and normal heart sounds.   Pulmonary/Chest: Effort normal and breath sounds normal.   Abdominal: Soft. Bowel sounds are normal.   Musculoskeletal: Normal range of motion. She exhibits edema.   + pitting edema bilaterally on ankles.   Neurological: She is alert and oriented to person, place, and time.   Skin: Skin is warm. Capillary refill takes less than 2 seconds.   Psychiatric: She has a normal mood and affect. Her behavior is normal. Judgment and thought content normal.       Significant Labs:  BMP:   Recent Labs   Lab 04/05/19 0340 04/05/19  1824      < > 140*   CL 98   < > 100   CO2 29   < > 28   BUN 36*   < > 33*   CREATININE 4.6*   < > 4.5*   CALCIUM 8.5*   < > 9.2   MG 2.2  --   --     < > = values in this interval not displayed.     CBC:   Recent Labs   Lab 04/05/19 0340   WBC 4.49   RBC 3.54*   HGB 11.9*   HCT 35.8*   PLT 71*   *   MCH 33.6*   MCHC 33.2     CMP:   Recent Labs   Lab 04/05/19 0340 04/05/19 1824      < > 140*   CALCIUM 8.5*   < > 9.2   ALBUMIN 3.3*  --   --    PROT 5.9*  --   --       < > 140   K 2.7*   < > 3.7   CO2 29   < > 28   CL 98   < > 100   BUN 36*   < > 33*   CREATININE 4.6*   < > 4.5*   ALKPHOS 75  --   --    ALT 36  --   --    AST 66*  --   --    BILITOT 0.8  --   --     < > = values in this interval not displayed.     Recent Labs   Lab 04/03/19 0928   COLORU Yellow   SPECGRAV 1.005   PHUR 6.0   PROTEINUA 1+*   BACTERIA Occasional   NITRITE Negative   LEUKOCYTESUR Negative   HYALINECASTS 0     All labs within the past 24 hours have been reviewed.     Significant Imaging:  Procedure Component Value Units Date/Time   US Retroperitoneal Complete [[id number]] Resulted: 04/04/19 0407   Order Status: Completed Updated: 04/04/19 0439   Narrative:     EXAMINATION:  US RETROPERITONEAL COMPLETE    CLINICAL HISTORY:  SHYANNE, labs suggestive of intrinsic disease;    TECHNIQUE:  Ultrasound of the  kidneys and urinary bladder was performed including color flow and Doppler evaluation of the kidneys.    COMPARISON:  11/16/2018.    FINDINGS:  Right kidney: The right kidney measures 11.1 cm. No cortical thinning. No loss of corticomedullary distinction. Resistive index measures 0.89.  No mass. No renal stone. No hydronephrosis.    Left kidney: The left kidney measures 10.7 cm. No cortical thinning. No loss of corticomedullary distinction. Resistive index measures 0.88.  No mass. No renal stone. No hydronephrosis.    The bladder is partially distended at the time of scanning and has an unremarkable appearance.   Impression:       Elevated bilateral renal arterial resistive indices which may represent medical renal disease.  No hydronephrosis.    Electronically signed by resident: Scot Cohen  Date: 04/04/2019  Time: 04:01    Electronically signed by: Kenny Benito MD  Date: 04/04/2019  Time: 04:07

## 2019-04-05 NOTE — PLAN OF CARE
04/05/19 1309   Post-Acute Status   Post-Acute Authorization Placement;Home Health/Hospice   Post-Acute Placement Status Additional Clinical Requested   Plan to dc home with HH over the weekend, SW waiting on order to send to [facility] so that HH can be arranged. SW will continue to follow.

## 2019-04-05 NOTE — ASSESSMENT & PLAN NOTE
"  - Stable with no signs or symptoms of ACS  - ECG stable, admission troponin mildly elevated but likely a consequence of her renal dysfunction, repeat flat  - Continued ho me rosuvastatin  - Otherwise holding ramipril (elevated creatinine)  - Not on asa as an outpatient - follows actively with PCP. Most recent stress test in our system (from 2015) suggestive of "mild, diffuse, non-obstructive coronary atherosclerosis without clinically significant, localized perfusion defects" per interpreting cardiologist  "

## 2019-04-05 NOTE — ASSESSMENT & PLAN NOTE
Patient with baseline S Cr 1.2 (feb 05 2019).   Now presents with S Cr 4.1.     -No urinary symptoms, no recent changes in urine output. History of 2-3 days of reduced food and water intake: max. 2 glasses of water per day due to nausea. No vomit, no loose stools. History positive for increased solid bowel movements for the last 5 days, prior to this, she had not eaten anything different or greasy or contaminated. No NSAID use, no prior ATB use. She does use omeprazole chronically.  -Physical exam on admission: dry mucous membranes.   -Physical exam today (after 2.4L IVF): MMM, 1+ pitting edema on ankles bilaterally, no crackles, no JVD.   -labs 04/03/2019: Cr 4.1 BUN: 45. FeUrea: 50.6%  UA: 1+prot. Occult blood: 2+. 2RBC, 1WBC. 0 hyaline casts.  -labs 04/04/2019: Cr 4.7, BUN: 43. FeNa: 16.1% (unrealiable, drawn after IVF resuscitation)  Urine sediment: some waxy casts indicative of tubular injury CKD, epithelial cells, cotton fibers, some WBCs (few). Urine prot/Cr ration: 1.39  -Renal US 04/03:  Right Kidney 11.1cm Resistive index: 0.89      Left kidney    10.7cm Resistive index: 0.88  04/05/2019: Cr. 4.6  BUN: 36  K+: 2.7 (on repletion).  Haptoglobin normal,   Phos:2.4    Patient initially addressed as possible prerenal SHYANNE likely 2/2 volume depletion, however no improvement after aggressive IV fluid resuscitation. Currently evaluating for causes of intrinsic renal failure. Differential diagnosis includes  AIN (PPI exposure but no rash, eosinophilia, fever), vasculitides, glomerulopathy due to viral infection.     Plan:   -Daily RFP's  -No acute indication of dialysis as of now.  -Continue to hold PPI and any nephrotoxic agents.   -Strict I/O monitoring.  -HARDEEP's, Anti-dsDNA, uric acid levels, ANCA, CRP, acute viral hepatitis panel.   -Urine Na, Urine K  -F/u with nephrology as an outpatient to study cause of hypokalemia if it persists after holding PPI's and resolution of increased bowel movements. (Will try to  schedule for soon follow-up appointment with our nephrology team if tomorrow creatinine levels show significant improvement).   -We suggest the patient be studied as an outpatient by PCP for smoking history, as she has high serum bicarbonate and CXR suggestive of COPD (hyperinflation of both lungs, teardrop shaped heart, mild flattening of diaphragms) in the setting of smoking history. (Consider ABG in the future to assess for possible chronic respiratory acidosis with metabolic compensation).   -We recommend hematology to be consulted due to thrombocytopenia.

## 2019-04-05 NOTE — PROGRESS NOTES
[facility] Hospital Medicine  Progress Note    Patient Name: Jacqueline O Favret  MRN: [id number]  Patient Class: IP- Inpatient   Admission Date: 4/3/2019  Length of Stay: 2 days  Attending Physician: Cristi Padilla MD  Primary Care Provider: Kristina Orozco MD    [facility] Team: [facility] V Cristi Padilla MD    Subjective:     Principal Problem:Acute renal failure superimposed on stage 3 chronic kidney disease    HPI:  Ms. Favret is a 73-year-old woman with HTN, HLD, CAD (MI in 1991), and CKD stage III (baseline creatinine ~1-1.2) who presents with nausea and malaise. Her symptoms started five days ago with increasing frequency of small solid bowel movements (> 5 daily), accompanied shortly thereafter by nausea and diminished appetite. Around that time she developed a dry cough and nasal congestion. No fevers, chills, or night sweats. No chest pain, shortness of breath, palpitations, or edema. She reports reduced UOP, though improved with fluids in the ED. No NSAID use. She does take ramipril.    Upon arrival to the ED she was hemodynamically stable. Lab workup significant for severe SHYANNE with creatinine 4.2, hypokalemia, and hypochloremia, along with modest transaminase elevation. Troponin was 0.048. A RUQ U/S was performed that revealed possible hepatic steatosis. She was given 2L of NS and Reglan with improvement in her symptoms.       Hospital Course:  4/3/2019: Admitted to [facility] with Dr. Padilla, flu PCR pending, SHYANNE workup underway, treating empirically for hypovolemia  4/4/2019: Symptomatically improved, SHYANNE worsening despite hydration. . Influenza negative. Nephrology consulted  4/5/2019: Now essentially asymptomatic, but with only marginally improved SHYANNE and worsening hypokalemia    Interval History:     Ms. Favret continues to feel well, again requesting that she leave today. Her renal function is only marginally improved, and she has worsening hypokalemia today and  was unable to tolerate IV replacement. Nephrology following     Review of Systems   Constitutional: Positive for activity change and appetite change. Negative for chills and fever.   HENT: Positive for congestion.    Respiratory: Positive for cough. Negative for shortness of breath.    Cardiovascular: Negative for chest pain and leg swelling.   Gastrointestinal: Negative for abdominal pain, constipation, diarrhea, nausea and vomiting.   Genitourinary: Negative for difficulty urinating and dysuria.   Musculoskeletal: Negative for arthralgias, back pain and myalgias.   Skin: Negative for rash and wound.   Neurological: Positive for weakness (diffuse). Negative for numbness.   Psychiatric/Behavioral: Negative for dysphoric mood. The patient is not nervous/anxious.      Objective:     Vital Signs (Most Recent):  Temp: 97.9 °F (36.6 °C) (04/05/19 1741)  Pulse: 81 (04/05/19 1741)  Resp: 18 (04/05/19 1741)  BP: (!) 149/75 (04/05/19 1741)  SpO2: 96 % (04/05/19 1741) Vital Signs (24h Range):  Temp:  [97.9 °F (36.6 °C)-99.5 °F (37.5 °C)] 97.9 °F (36.6 °C)  Pulse:  [58-81] 81  Resp:  [16-20] 18  SpO2:  [92 %-96 %] 96 %  BP: (136-155)/(63-77) 149/75     Weight: 58.4 kg (128 lb 12 oz)  Body mass index is 20.16 kg/m².    Intake/Output Summary (Last 24 hours) at 4/5/2019 1847  Last data filed at 4/5/2019 1540  Gross per 24 hour   Intake 1460 ml   Output 750 ml   Net 710 ml      Physical Exam   Constitutional: She is oriented to person, place, and time. No distress.   Eyes: Pupils are equal, round, and reactive to light.   Cardiovascular: Normal rate and regular rhythm.   No murmur heard.  Pulmonary/Chest: Effort normal and breath sounds normal. No respiratory distress. She has no wheezes.   Abdominal: Soft. Bowel sounds are normal. She exhibits no distension. There is no tenderness.   Musculoskeletal: She exhibits no edema or tenderness.   Neurological: She is alert and oriented to person, place, and time. She displays normal  "reflexes. No cranial nerve deficit.   Skin: Skin is warm and dry. No rash noted. She is not diaphoretic. No erythema.   Psychiatric: She has a normal mood and affect. Her behavior is normal.       Significant Labs:     CBC:    Recent Labs   Lab 04/04/19  0535 04/05/19  0340   WBC 5.48 4.49   GRAN 71.2  3.9 63.8  2.9   HGB 11.5* 11.9*   HCT 33.7* 35.8*   PLT 80* 71*       Chem 10:  Recent Labs   Lab 04/04/19  0535 04/05/19  0340 04/05/19  1325 04/05/19  1519    139 137  --    K 3.0* 2.7* 3.1*  --    CL 96 98 97  --    CO2 28 29 30*  --    BUN 43* 36* 33*  --    CREATININE 4.7* 4.6* 4.4*  --     105 143*  --    CALCIUM 8.3* 8.5* 8.7  --    MG 1.8 2.2  --   --    PHOS  --   --   --  2.4*       LFTs:  Recent Labs   Lab 04/04/19  0535 04/05/19  0340   ALKPHOS 65 75   BILITOT 0.8 0.8   AST 85* 66*   ALT 39 36   ALBUMIN 3.2* 3.3*       Significant Imaging:     US RP 4/4/2019:  "Elevated bilateral renal arterial resistive indices which may represent medical renal disease.  No hydronephrosis." - per interpreting radiologist    Assessment/Plan:      * Acute renal failure superimposed on stage 3 chronic kidney disease    - Marginal improvement, down to 4.4 this morning  - Admission creatinine 4.2, above baseline of ~1.2  - Originally suspected pre-renal by history, subsequent evaluation (FEUrea, US) suggestive of an intrinsic process  - Nephrology following, concern for ATN vs. AIN vs. TMA (lower on the differential - labs not suggestive of hemolysis with hypokalemia, normal total bili and haptoglobin, though LDH is high)  - Will renally dose all medications and avoid nephrotoxins including IV contrast agents  - Will follow with CMP, daily weights, and strict I/O measurement  - Continue empiric hydration with NS. Bowel movement frequency normalized.    Elevated transaminase level    - Improving  - AST and ALT elevated on arrival  - Not a new issue, but worse than prior  - RUQ US showing potential hepatic " "steatosis - suspect this + dehydration as the etiology  - Resumed home rosuvastatin    Hypokalemia    - Worsened today down to 2.7  - Improved with supplementation - originally ordered IV + PO, but she did not tolerated the IV. She greatly prefers liquid > tablet formations  - Repeat up to 3.7, discontinued tele    Adjustment disorder with depressed mood    - Stable  - Continue home mirtazepine    Coronary artery disease    - Stable with no signs or symptoms of ACS  - ECG stable, admission troponin mildly elevated but likely a consequence of her renal dysfunction, repeat flat  - Continued ho me rosuvastatin  - Otherwise holding ramipril (elevated creatinine)  - Not on asa as an outpatient - follows actively with PCP. Most recent stress test in our system (from 2015) suggestive of "mild, diffuse, non-obstructive coronary atherosclerosis without clinically significant, localized perfusion defects" per interpreting cardiologist    Hyperlipidemia    - Resumed home rosuvastatin  - Cardiac diet    Hypertension    - Stable, intermittent mild elevations  - Holding home ACE in the setting of SHYANNE. Continue amlodipine - may increase dose if persistently elevated      VTE Risk Mitigation (From admission, onward)        Ordered     Place sequential compression device  Until discontinued      04/04/19 1408     heparin (porcine) injection 5,000 Units  Every 8 hours      04/03/19 1227     IP VTE HIGH RISK PATIENT  Once      04/03/19 1227              Cristi Padilla MD  Department of Hospital Medicine   [facility]  "

## 2019-04-06 VITALS
BODY MASS INDEX: 20.21 KG/M2 | OXYGEN SATURATION: 97 % | HEIGHT: 67 IN | RESPIRATION RATE: 16 BRPM | HEART RATE: 56 BPM | DIASTOLIC BLOOD PRESSURE: 67 MMHG | WEIGHT: 128.75 LBS | TEMPERATURE: 98 F | SYSTOLIC BLOOD PRESSURE: 149 MMHG

## 2019-04-06 LAB
ALBUMIN SERPL BCP-MCNC: 3.1 G/DL (ref 3.5–5.2)
ALP SERPL-CCNC: 68 U/L (ref 55–135)
ALT SERPL W/O P-5'-P-CCNC: 32 U/L (ref 10–44)
ANION GAP SERPL CALC-SCNC: 10 MMOL/L (ref 8–16)
AST SERPL-CCNC: 43 U/L (ref 10–40)
BASOPHILS # BLD AUTO: 0.01 K/UL (ref 0–0.2)
BASOPHILS NFR BLD: 0.2 % (ref 0–1.9)
BILIRUB SERPL-MCNC: 0.9 MG/DL (ref 0.1–1)
BUN SERPL-MCNC: 29 MG/DL (ref 8–23)
CALCIUM SERPL-MCNC: 8.6 MG/DL (ref 8.7–10.5)
CHLORIDE SERPL-SCNC: 102 MMOL/L (ref 95–110)
CO2 SERPL-SCNC: 29 MMOL/L (ref 23–29)
CREAT SERPL-MCNC: 4 MG/DL (ref 0.5–1.4)
DIFFERENTIAL METHOD: ABNORMAL
EOSINOPHIL # BLD AUTO: 0.1 K/UL (ref 0–0.5)
EOSINOPHIL NFR BLD: 2.4 % (ref 0–8)
ERYTHROCYTE [DISTWIDTH] IN BLOOD BY AUTOMATED COUNT: 12.6 % (ref 11.5–14.5)
EST. GFR  (AFRICAN AMERICAN): 12.2 ML/MIN/1.73 M^2
EST. GFR  (NON AFRICAN AMERICAN): 10.6 ML/MIN/1.73 M^2
GLUCOSE SERPL-MCNC: 96 MG/DL (ref 70–110)
HCT VFR BLD AUTO: 37.1 % (ref 37–48.5)
HGB BLD-MCNC: 12.1 G/DL (ref 12–16)
IMM GRANULOCYTES # BLD AUTO: 0.02 K/UL (ref 0–0.04)
IMM GRANULOCYTES NFR BLD AUTO: 0.5 % (ref 0–0.5)
LYMPHOCYTES # BLD AUTO: 1.1 K/UL (ref 1–4.8)
LYMPHOCYTES NFR BLD: 26.2 % (ref 18–48)
MAGNESIUM SERPL-MCNC: 1.5 MG/DL (ref 1.6–2.6)
MCH RBC QN AUTO: 33.4 PG (ref 27–31)
MCHC RBC AUTO-ENTMCNC: 32.6 G/DL (ref 32–36)
MCV RBC AUTO: 103 FL (ref 82–98)
MONOCYTES # BLD AUTO: 0.7 K/UL (ref 0.3–1)
MONOCYTES NFR BLD: 17.4 % (ref 4–15)
NEUTROPHILS # BLD AUTO: 2.2 K/UL (ref 1.8–7.7)
NEUTROPHILS NFR BLD: 53.3 % (ref 38–73)
NRBC BLD-RTO: 0 /100 WBC
PLATELET # BLD AUTO: 71 K/UL (ref 150–350)
PMV BLD AUTO: 11.2 FL (ref 9.2–12.9)
POTASSIUM SERPL-SCNC: 3.1 MMOL/L (ref 3.5–5.1)
PROT SERPL-MCNC: 6 G/DL (ref 6–8.4)
RBC # BLD AUTO: 3.62 M/UL (ref 4–5.4)
SODIUM SERPL-SCNC: 141 MMOL/L (ref 136–145)
WBC # BLD AUTO: 4.13 K/UL (ref 3.9–12.7)

## 2019-04-06 PROCEDURE — 99238 HOSP IP/OBS DSCHRG MGMT 30/<: CPT | Mod: ,,, | Performed by: HOSPITALIST

## 2019-04-06 PROCEDURE — 25000003 PHARM REV CODE 250: Performed by: HOSPITALIST

## 2019-04-06 PROCEDURE — 99238 PR HOSPITAL DISCHARGE DAY,<30 MIN: ICD-10-PCS | Mod: ,,, | Performed by: HOSPITALIST

## 2019-04-06 PROCEDURE — 83735 ASSAY OF MAGNESIUM: CPT

## 2019-04-06 PROCEDURE — 36415 COLL VENOUS BLD VENIPUNCTURE: CPT

## 2019-04-06 PROCEDURE — 80053 COMPREHEN METABOLIC PANEL: CPT

## 2019-04-06 PROCEDURE — 94761 N-INVAS EAR/PLS OXIMETRY MLT: CPT

## 2019-04-06 PROCEDURE — 25000003 PHARM REV CODE 250: Performed by: INTERNAL MEDICINE

## 2019-04-06 PROCEDURE — 85025 COMPLETE CBC W/AUTO DIFF WBC: CPT

## 2019-04-06 RX ORDER — POTASSIUM CHLORIDE 20 MEQ/15ML
10 SOLUTION ORAL DAILY
Qty: 473 ML | Refills: 0 | Status: SHIPPED | OUTPATIENT
Start: 2019-04-06 | End: 2019-04-30 | Stop reason: SDUPTHER

## 2019-04-06 RX ADMIN — POTASSIUM BICARBONATE 50 MEQ: 25 TABLET, EFFERVESCENT ORAL at 02:04

## 2019-04-06 RX ADMIN — ROSUVASTATIN CALCIUM 20 MG: 20 TABLET, FILM COATED ORAL at 09:04

## 2019-04-06 RX ADMIN — METOPROLOL TARTRATE 50 MG: 50 TABLET ORAL at 09:04

## 2019-04-06 RX ADMIN — AMLODIPINE BESYLATE 2.5 MG: 2.5 TABLET ORAL at 09:04

## 2019-04-06 NOTE — PLAN OF CARE
Problem: Fall Injury Risk  Goal: Absence of Fall and Fall-Related Injury  Outcome: Outcome(s) achieved Date Met: 04/05/19  No falls this shift, pt ambulated independently

## 2019-04-06 NOTE — PROGRESS NOTES
[facility]  Nephrology  Progress Note    Patient Name: Jacqueline O Favret  MRN: [id number]  Admission Date: 4/3/2019  Hospital Length of Stay: 2 days  Attending Provider: Cristi Padilla MD   Primary Care Physician: Kristina Orozco MD  Principal Problem:Acute renal failure superimposed on stage 3 chronic kidney disease    Subjective:     HPI: Mrs Jacqueline Favret is a 73 year old female patient with PMH of HTN, HLD, CKD3A (baseline Cr 1.2 -02/05/2019)  who came to the ER on 04/03/2019 due to increase in (solid, small) bowel movement frequency 5 days prior to admission. Associated with this, patient developed nausea, some nasal congestion and dry cough. She decreased PO intake significantly two days prior to admission due to feeling nauseous. She denies vomit, loose stools, abdominal pain or urinary symptoms.    In the ED she was found to be slightly hypertensive, other wise normal vital signs, she had dry mucous membranes, elevated serum Cr (4.1), BUN (45) and thrombocytopenia (135.000). She was given 2L of NS and antiemetic medications, which improved her symptoms.     Nephrology team was consulted for CKD3a with superimposed SHYANNE.     Interval History: Patient currently asymptomatic. Bowel movements back to normal (1 per day).    Review of patient's allergies indicates:  No Known Allergies  Current Facility-Administered Medications   Medication Frequency    0.9%  NaCl infusion Continuous    acetaminophen tablet 650 mg Q4H PRN    albuterol-ipratropium 2.5 mg-0.5 mg/3 mL nebulizer solution 3 mL Q4H PRN    amLODIPine tablet 2.5 mg Daily    bisacodyl suppository 10 mg Daily PRN    dextrose 50% injection 12.5 g PRN    dextrose 50% injection 25 g PRN    glucagon (human recombinant) injection 1 mg PRN    glucose chewable tablet 16 g PRN    glucose chewable tablet 24 g PRN    heparin (porcine) injection 5,000 Units Q8H    hydrOXYzine HCl tablet 25 mg Nightly PRN    metoprolol tartrate  (LOPRESSOR) tablet 50 mg BID    mirtazapine tablet 15 mg QHS    ondansetron injection 8 mg Q8H PRN    promethazine (PHENERGAN) 6.25 mg in dextrose 5 % 50 mL IVPB Q6H PRN    ramelteon tablet 8 mg Nightly PRN    sodium chloride 0.9% flush 10 mL PRN       Objective:     Vital Signs (Most Recent):  Temp: 99.5 °F (37.5 °C) (04/05/19 1310)  Pulse: 74 (04/05/19 1310)  Resp: 20 (04/05/19 1310)  BP: (!) 144/74 (04/05/19 1310)  SpO2: (!) 94 % (04/05/19 1310)  O2 Device (Oxygen Therapy): room air (04/05/19 1310) Vital Signs (24h Range):  Temp:  [98.6 °F (37 °C)-99.5 °F (37.5 °C)] 99.5 °F (37.5 °C)  Pulse:  [58-79] 74  Resp:  [16-20] 20  SpO2:  [92 %-96 %] 94 %  BP: (136-155)/(63-77) 144/74     Weight: 58.4 kg (128 lb 12 oz) (04/04/19 0400)  Body mass index is 20.16 kg/m².  Body surface area is 1.66 meters squared.    I/O last 3 completed shifts:  In: 3228.3 [P.O.:600; I.V.:2628.3]  Out: -     Physical Exam   Constitutional: She is oriented to person, place, and time. She appears well-developed and well-nourished. No distress.   HENT:   Head: Normocephalic and atraumatic.   Eyes: Pupils are equal, round, and reactive to light. Conjunctivae and EOM are normal.   Neck: Normal range of motion.   Cardiovascular: Normal rate, regular rhythm and normal heart sounds.   Pulmonary/Chest: Effort normal and breath sounds normal.   Abdominal: Soft. Bowel sounds are normal.   Musculoskeletal: Normal range of motion. She exhibits edema.   + pitting edema bilaterally on ankles.   Neurological: She is alert and oriented to person, place, and time.   Skin: Skin is warm. Capillary refill takes less than 2 seconds.   Psychiatric: She has a normal mood and affect. Her behavior is normal. Judgment and thought content normal.       Significant Labs:  BMP:   Recent Labs   Lab 04/05/19  0340  04/05/19  1824      < > 140*   CL 98   < > 100   CO2 29   < > 28   BUN 36*   < > 33*   CREATININE 4.6*   < > 4.5*   CALCIUM 8.5*   < > 9.2   MG 2.2   --   --     < > = values in this interval not displayed.     CBC:   Recent Labs   Lab 04/05/19  0340   WBC 4.49   RBC 3.54*   HGB 11.9*   HCT 35.8*   PLT 71*   *   MCH 33.6*   MCHC 33.2     CMP:   Recent Labs   Lab 04/05/19  0340  04/05/19  1824      < > 140*   CALCIUM 8.5*   < > 9.2   ALBUMIN 3.3*  --   --    PROT 5.9*  --   --       < > 140   K 2.7*   < > 3.7   CO2 29   < > 28   CL 98   < > 100   BUN 36*   < > 33*   CREATININE 4.6*   < > 4.5*   ALKPHOS 75  --   --    ALT 36  --   --    AST 66*  --   --    BILITOT 0.8  --   --     < > = values in this interval not displayed.     Recent Labs   Lab 04/03/19  0928   COLORU Yellow   SPECGRAV 1.005   PHUR 6.0   PROTEINUA 1+*   BACTERIA Occasional   NITRITE Negative   LEUKOCYTESUR Negative   HYALINECASTS 0     All labs within the past 24 hours have been reviewed.     Significant Imaging:  Procedure Component Value Units Date/Time   US Retroperitoneal Complete [[id number]] Resulted: 04/04/19 0407   Order Status: Completed Updated: 04/04/19 0439   Narrative:     EXAMINATION:  US RETROPERITONEAL COMPLETE    CLINICAL HISTORY:  SHYANNE, labs suggestive of intrinsic disease;    TECHNIQUE:  Ultrasound of the kidneys and urinary bladder was performed including color flow and Doppler evaluation of the kidneys.    COMPARISON:  11/16/2018.    FINDINGS:  Right kidney: The right kidney measures 11.1 cm. No cortical thinning. No loss of corticomedullary distinction. Resistive index measures 0.89.  No mass. No renal stone. No hydronephrosis.    Left kidney: The left kidney measures 10.7 cm. No cortical thinning. No loss of corticomedullary distinction. Resistive index measures 0.88.  No mass. No renal stone. No hydronephrosis.    The bladder is partially distended at the time of scanning and has an unremarkable appearance.   Impression:       Elevated bilateral renal arterial resistive indices which may represent medical renal disease.  No  hydronephrosis.    Electronically signed by resident: Scot Cohen  Date: 04/04/2019  Time: 04:01    Electronically signed by: Kenny Benito MD  Date: 04/04/2019  Time: 04:07     Assessment/Plan:     * Acute renal failure superimposed on stage 3 chronic kidney disease  Patient with baseline S Cr 1.2 (feb 05 2019).   Now presents with S Cr 4.1.     -No urinary symptoms, no recent changes in urine output. History of 2-3 days of reduced food and water intake: max. 2 glasses of water per day due to nausea. No vomit, no loose stools. History positive for increased solid bowel movements for the last 5 days, prior to this, she had not eaten anything different or greasy or contaminated. No NSAID use, no prior ATB use. She does use omeprazole chronically.  -Physical exam on admission: dry mucous membranes.   -Physical exam today (after 2.4L IVF): MMM, 1+ pitting edema on ankles bilaterally, no crackles, no JVD.   -labs 04/03/2019: Cr 4.1 BUN: 45. FeUrea: 50.6%  UA: 1+prot. Occult blood: 2+. 2RBC, 1WBC. 0 hyaline casts.  -labs 04/04/2019: Cr 4.7, BUN: 43. FeNa: 16.1% (unrealiable, drawn after IVF resuscitation)  Urine sediment: some waxy casts indicative of tubular injury CKD, epithelial cells, cotton fibers, some WBCs (few). Urine prot/Cr ration: 1.39  -Renal US 04/03:  Right Kidney 11.1cm Resistive index: 0.89      Left kidney    10.7cm Resistive index: 0.88  04/05/2019: Cr. 4.6  BUN: 36  K+: 2.7 (on repletion).  Haptoglobin normal,   Phos:2.4    Patient initially addressed as possible prerenal SHYANNE likely 2/2 volume depletion, however no improvement after aggressive IV fluid resuscitation. Currently evaluating for causes of intrinsic renal failure. Differential diagnosis includes  AIN (PPI exposure but no rash, eosinophilia, fever), vasculitides, glomerulopathy due to viral infection.     Plan:   -Daily RFP's  -No acute indication of dialysis as of now.  -Continue to hold PPI and any nephrotoxic agents.   -Strict I/O  monitoring.  -HARDEEP's, Anti-dsDNA, uric acid levels, ANCA, CRP, acute viral hepatitis panel.   -Urine Na, Urine K  -F/u with nephrology as an outpatient to study cause of hypokalemia if it persists after holding PPI's and resolution of increased bowel movements. (Will try to schedule for soon follow-up appointment with our nephrology team if tomorrow creatinine levels show significant improvement).   -We suggest the patient be studied as an outpatient by PCP for smoking history, as she has high serum bicarbonate and CXR suggestive of COPD (hyperinflation of both lungs, teardrop shaped heart, mild flattening of diaphragms) in the setting of smoking history. (Consider ABG in the future to assess for possible chronic respiratory acidosis with metabolic compensation).   -We recommend hematology to be consulted due to thrombocytopenia.     Hypokalemia  -Patient has taken omeprazole for years without knowing the exact indication. She does not remember having epigastric pain or gastritis. PPI's are associated with hypokalemia, we advised her to stop taking it at home. Since admission, PPI's have been held per primary team.   -For the last 5 days she has had increased bowel movements, this could have also led to hypokalemia.     Recommendations:  -continue to replace potassium cautiously due to SHYANNE.   -Monitor K      Thank you for your consult. I will follow-up with patient. Please contact us if you have any additional questions.    Dejuan Beasley MD  Nephrology  [facility]

## 2019-04-06 NOTE — DISCHARGE SUMMARY
Discharge Summary  Hospital Medicine    Attending Provider on Discharge: Blayne Driver MD  [facility] Hospital Medicine Team: [facility] HOSP MED R  Date of Admission:  4/3/2019     Date of Discharge:  4/6/2019  Length of Stay:  LOS: 3 days   Code status: Full Code    Active Hospital Problems    Diagnosis  POA    *Acute renal failure superimposed on stage 3 chronic kidney disease [N17.9, N18.3]  Yes    Hypokalemia [E87.6]  Yes    Elevated transaminase level [R74.0]  Yes    Adjustment disorder with depressed mood [F43.21]  Yes    Coronary artery disease [I25.10]  Yes    Hyperlipidemia [E78.5]  Yes    Hypertension [I10]  Yes      Resolved Hospital Problems   No resolved problems to display.        HPI  73-year-old woman with HTN, HLD, CAD (MI in 1991), and CKD stage III (baseline creatinine ~1-1.2) who presents with nausea and malaise. Her symptoms started five days ago with increasing frequency of small solid bowel movements (> 5 daily), accompanied shortly thereafter by nausea and diminished appetite. Around that time she developed a dry cough and nasal congestion. No fevers, chills, or night sweats. No chest pain, shortness of breath, palpitations, or edema. She reports reduced UOP, though improved with fluids in the ED. No NSAID use. She does take ramipril.     Upon arrival to the ED she was hemodynamically stable. Lab workup significant for severe SHYANNE with creatinine 4.2, hypokalemia, and hypochloremia, along with modest transaminase elevation. Troponin was 0.048. A RUQ U/S was performed that revealed possible hepatic steatosis. She was given 2L of NS and Reglan with improvement in her symptoms.     Hospital Course    Acute renal failure superimposed on stage 3 chronic kidney disease     - Admission creatinine 4.2, above baseline of ~1.2, 4.0 at discharge  - No indications for emergent HD  - UA ordered, along with urine creatinine and urine urea to calculate FEUrea given recent diuretic use  - Etiology likely pre-renal  "given history of decreased PO intake/volume loss, physical examination, and hypochloremia   - Already received 2L fluids in the ED, will continue empiric treatment with continuous infusion of NS at 100 mL/hr x 24 hours and re-assess. 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" needs close follow up with nephrology clinic  - [facility] clinic scheduled for 04/10/2019    Elevated transaminase level     - AST and ALT elevated on arrival  - Not a new issue, but worse than prior  - RUQ US showing potential hepatic steatosis  - repeat CMP as outpatient, statin resumed on discharge     Hypokalemia     - Admission K 2.7  - Replaced by ED; f/u BMP ordered for this afternoon - will need to be cautious given SHYANNE  - Mg ordered as add-on  - as per nephrology, patient sent on low dose potassium supplement   - will need close follow up in priority and nephrology clinic      Adjustment disorder with depressed mood     - Stable  - Continue home mirtazepine     Coronary artery disease     - Stable with no signs or symptoms of ACS  - ECG stable, admission troponin mildly elevated but likely a consequence of her renal dysfunction. Will trend given demographics  - Otherwise holding rosuvastatin (elevated transaminases) and ramipril (elevated creatinine)  - Not on asa as an outpatient - follows actively with PCP. Most recent stress test in our system (from 2015) suggestive of "mild, diffuse, non-obstructive coronary atherosclerosis without clinically significant, localized perfusion defects" per interpreting cardiologist     Hyperlipidemia     - Holding home rosuvastatin given elevated transaminases  - Will likely resume tomorrow pending improvement with hydration  - Cardiac diet     Hypertension     - Stable, mildly " elevated on admission  - Holding home BP medications in the setting of SHYANNE  - Will start hydralazine as a temporary measure if BP worsens, otherwise cardiac diet        Recent Labs   Lab 04/04/19  0535 04/05/19  0340 04/06/19  0609   WBC 5.48 4.49 4.13   HGB 11.5* 11.9* 12.1   HCT 33.7* 35.8* 37.1   PLT 80* 71* 71*     Recent Labs   Lab 04/03/19  0931  04/04/19  0535 04/05/19  0340 04/05/19  1325 04/05/19  1519 04/05/19  1824 04/06/19  0609      < > 138 139 137  --  140 141   K 3.2*   < > 3.0* 2.7* 3.1*  --  3.7 3.1*   CL 88*   < > 96 98 97  --  100 102   CO2 29   < > 28 29 30*  --  28 29   BUN 47*   < > 43* 36* 33*  --  33* 29*   CREATININE 4.2*   < > 4.7* 4.6* 4.4*  --  4.5* 4.0*   GLU 83   < > 107 105 143*  --  140* 96   CALCIUM 9.8   < > 8.3* 8.5* 8.7  --  9.2 8.6*   MG 2.6  --  1.8 2.2  --   --   --  1.5*   PHOS  --   --   --   --   --  2.4*  --   --    LIPASE 185*  --   --   --   --   --   --   --     < > = values in this interval not displayed.     Recent Labs   Lab 04/04/19 0535 04/05/19 0340 04/06/19  0609   ALKPHOS 65 75 68   ALT 39 36 32   AST 85* 66* 43*   ALBUMIN 3.2* 3.3* 3.1*   PROT 5.6* 5.9* 6.0   BILITOT 0.8 0.8 0.9      Recent Labs     04/04/19  0535   *     No results for input(s): LACTATE in the last 72 hours.    Recent Labs   Lab 04/03/19  1346   POCTGLUCOSE 91      Hemoglobin A1C   Date Value Ref Range Status   04/04/2019 5.0 4.0 - 5.6 % Final     Comment:     ADA Screening Guidelines:  5.7-6.4%  Consistent with prediabetes  >or=6.5%  Consistent with diabetes  High levels of fetal hemoglobin interfere with the HbA1C  assay. Heterozygous hemoglobin variants (HbS, HgC, etc)do  not significantly interfere with this assay.   However, presence of multiple variants may affect accuracy.     08/27/2018 4.8 4.0 - 5.6 % Final     Comment:     ADA Screening Guidelines:  5.7-6.4%  Consistent with prediabetes  >or=6.5%  Consistent with diabetes  High levels of fetal hemoglobin interfere with  the HbA1C  assay. Heterozygous hemoglobin variants (HbS, HgC, etc)do  not significantly interfere with this assay.   However, presence of multiple variants may affect accuracy.     06/19/2017 5.2 4.0 - 5.6 % Final     Comment:     According to ADA guidelines, hemoglobin A1c <7.0% represents  optimal control in non-pregnant diabetic patients. Different  metrics may apply to specific patient populations.   Standards of Medical Care in Diabetes-2016.  For the purpose of screening for the presence of diabetes:  <5.7%     Consistent with the absence of diabetes  5.7-6.4%  Consistent with increasing risk for diabetes   (prediabetes)  >or=6.5%  Consistent with diabetes  Currently, no consensus exists for use of hemoglobin A1c  for diagnosis of diabetes for children.  This Hemoglobin A1c assay has significant interference with fetal   hemoglobin   (HbF). The results are invalid for patients with abnormal amounts of   HbF,   including those with known Hereditary Persistence   of Fetal Hemoglobin. Heterozygous hemoglobin variants (HbAS, HbAC,   HbAD, HbAE, HbA2) do not significantly interfere with this assay;   however, presence of multiple variants in a sample may impact the %   interference.         Procedures: none     Consultants: nephrology    Discharge Medication List as of 4/6/2019  3:51 PM      START taking these medications    Details   potassium chloride 10% (KAYCIEL) 20 mEq/15 mL oral solution Take 7.5 mLs (10 mEq total) by mouth once daily., Starting Sat 4/6/2019, Normal         CONTINUE these medications which have NOT CHANGED    Details   amLODIPine (NORVASC) 2.5 MG tablet Take 1 tablet (2.5 mg total) by mouth once daily., Starting Thu 8/23/2018, Until Fri 8/23/2019, Normal      metoprolol tartrate (LOPRESSOR) 50 MG tablet TAKE 1 TABLET (50 MG TOTAL) BY MOUTH 2 (TWO) TIMES DAILY., Starting Sun 7/15/2018, Normal      rosuvastatin (CRESTOR) 20 MG tablet TAKE 1 TABLET BY MOUTH EVERY DAY, Normal      !! albuterol  (VENTOLIN HFA) 90 mcg/actuation inhaler INHALE 1-2 PUFFS INTO THE LUNGS EVERY 4 (FOUR) HOURS AS NEEDED., Normal      ALPRAZolam (XANAX) 0.25 MG tablet TAKE 1/2 TO 1 TABLET BY MOUTH BEFORE FLIGHT, Print      co-enzyme Q-10 30 mg capsule Take 30 mg by mouth once daily. , Historical Med      fexofenadine (ALLEGRA) 180 MG tablet Take by mouth daily as needed. 1 Tablet Oral Every day, Until Discontinued, Historical Med      ketoconazole (NIZORAL) 2 % shampoo Wash hair with medicated shampoo at least 2x/week - let sit on scalp at least 5 minutes prior to rinsing, Normal      mirtazapine (REMERON) 15 MG tablet Take 15 mg by mouth every evening., Starting Fri 11/2/2018, Historical Med      multivitamin (ONE DAILY MULTIVITAMIN) per tablet Take 1 tablet by mouth once daily., Until Discontinued, Historical Med      ondansetron (ZOFRAN-ODT) 4 MG TbDL Take 1 tablet (4 mg total) by mouth every 6 (six) hours as needed (nausea)., Starting Sun 12/30/2018, Normal      !! VENTOLIN HFA 90 mcg/actuation inhaler INHALE 1-2 PUFFS INTO THE LUNGS EVERY 4 (FOUR) HOURS AS NEEDED., Historical Med       !! - Potential duplicate medications found. Please discuss with provider.      STOP taking these medications       omeprazole (PRILOSEC) 20 MG capsule Comments:   Reason for Stopping:         ramipril (ALTACE) 10 MG capsule Comments:   Reason for Stopping:         potassium chloride SA (K-DUR,KLOR-CON) 20 MEQ tablet Comments:   Reason for Stopping:               Discharge Diet:renal diet with Normal Fluid intake of 1500 - 2000 mL per day    Activity: activity as tolerated    Discharge Condition: Stable    Disposition: Home or Self Care    Tests pending at the time of discharge: CMP     Time spent  on the discharge of the patient including review of hospital course with the patient. reviewing discharge medications and arranging follow-up care 35 minutes     Discharge examination Patient was seen and examined on the date of discharge and  determined to be suitable for discharge.    Discharge plan     Future Appointments   Date Time Provider Department Center   4/10/2019  3:30 PM [facility]   7/30/2019  7:30 AM Kristina Orozco MD [facility] [place]       Blayne Benz MD  Staff Hospitalist  Department of Hospital Medicine  [facility]   [phone]

## 2019-04-07 NOTE — PROGRESS NOTES
"[facility]  Follow-up Visit Progress Note     PRESENTING HISTORY     PCP: Kristina Orozco MD  Chief Complaint/Reason for Visit:  Follow up from recent visit.      No chief complaint on file.      History of Present Illness & ROS: Ms. Jacqueline O Favret is a 73 y.o. female.  Since most recent discharge, reports that appetite is good, started having a little "diarrhea, but getting better", endorses "good" urine output, no abd pain, fevers, or other discomforts.     Review of Systems:  Eyes: denies visual changes at this time denies floaters   ENT: no nasal congestion or sore throat  Respiratory: no cough or shortness of breath  Cardiovascular: no chest pain or palpitations  Gastrointestinal: no nausea or vomiting, no abdominal pain or change in bowel habits  Genitourinary: no hematuria or dysuria; denies frequency  Hematologic/Lymphatic: no easy bruising or lymphadenopathy  Musculoskeletal: no arthralgias or myalgias  Neurological: no seizures or tremors  Endocrine: no heat or cold intolerance      PAST HISTORY:     Past Medical History:   Diagnosis Date    Abnormal liver enzymes 4/6/2015    Acute on chronic kidney disease, stage 3 4/18/2013    Alcohol-induced acute pancreatitis 8/16/2015    Anemia     Basal cell carcinoma 1985    nose    Bunion, right foot 12/14/2012    Compression fracture 11/14/2013    MI (myocardial infarction) 1991    PAF (paroxysmal atrial fibrillation) 9/8/2015    During acute illness     SCC (squamous cell carcinoma) 05/11/2016    in situ left lower lip, nasal bridge       Past Surgical History:   Procedure Laterality Date    blephroplasty      BREAST BIOPSY Right     excisional 1985    BUNIONECTOMY  Jan 2013    right foot    CARDIAC CATHETERIZATION      COLONOSCOPY N/A 12/21/2016    Performed [facility] Freedom [facility]oval [facility] at [facility] (4TH FLR)    COLONOSCOPY N/A 9/20/2016    [facility] by Rachelle Guerra [facility] at [facility] (4TH FLR)    EGD (ESOPHAGOGASTRODUODENOSCOPY) N/A " 7/19/2018    [facility] ([facility])    FOOT SURGERY      right foot    HYSTERECTOMY  1980s    bleeding    OPEN REDUCTION INTERNAL FIXATION-ORBITAL FRACTURE Right 1/23/2015    [facility] 2ND F[facility]    OPEN REDUCTION INTERNAL FIXATION-ORBITAL FRACTURE Right 10/22/2014    [facility]    orbital fx      REPAIR-ECTROPION Bilateral 3/29/2016    [facility]    REPAIR-PTOSIS Bilateral 3/29/2016    [facility]    TONSILLECTOMY      VAGINA SURGERY         Family History   Problem Relation Age of Onset    Diabetes Mother     Heart disease Father 57        sudden death    Celiac disease Neg Hx     Colon cancer Neg Hx     Crohn's disease Neg Hx     Esophageal cancer Neg Hx     Inflammatory bowel disease Neg Hx     Irritable bowel syndrome Neg Hx     Liver cancer Neg Hx     Rectal cancer Neg Hx     Stomach cancer Neg Hx     Ulcerative colitis Neg Hx     Melanoma Neg Hx        Social History     Socioeconomic History    Marital status:      Spouse name: Not on file    Number of children: Not on file    Years of education: Not on file    Highest education level: Not on file   Occupational History    Not on file   Social Needs    Financial resource strain: Not on file    Food insecurity:     Worry: Not on file     Inability: Not on file    Transportation needs:     Medical: Not on file     Non-medical: Not on file   Tobacco Use    Smoking status: Former Smoker     Packs/day: 1.00     Years: 15.00     Pack years: 15.00     Types: Cigarettes     Last attempt to quit: 4/6/2004     Years since quitting: 15.0    Smokeless tobacco: Never Used   Substance and Sexual Activity    Alcohol use: Yes     Alcohol/week: 3.0 - 6.0 oz     Types: 5 - 10 Standard drinks or equivalent per week     Comment: 1 glass scotch a day,  last use last night    Drug use: No    Sexual activity: Never     Partners: Male     Birth control/protection: None   Lifestyle    Physical activity:     Days per week: Not on file     Minutes per session: Not on file    Stress: Not on file   Relationships    Social connections:     Talks on phone: Not on file     Gets together: Not on file     Attends Sabianist service: Not on file     Active member of club or organization: Not on file     Attends meetings of clubs or organizations: Not on file     Relationship status: Not on file   Other Topics Concern    Are you pregnant or think you may be? Not Asked    Breast-feeding Not Asked   Social History Narrative    Not on file       MEDICATIONS & ALLERGIES:     Current Outpatient Medications on File Prior to Visit   Medication Sig Dispense Refill    albuterol (VENTOLIN HFA) 90 mcg/actuation inhaler INHALE 1-2 PUFFS INTO THE LUNGS EVERY 4 (FOUR) HOURS AS NEEDED. 1 Inhaler 3    ALPRAZolam (XANAX) 0.25 MG tablet TAKE 1/2 TO 1 TABLET BY MOUTH BEFORE FLIGHT 20 tablet 0    amLODIPine (NORVASC) 2.5 MG tablet Take 1 tablet (2.5 mg total) by mouth once daily. 90 tablet 3    co-enzyme Q-10 30 mg capsule Take 30 mg by mouth once daily.       fexofenadine (ALLEGRA) 180 MG tablet Take by mouth daily as needed. 1 Tablet Oral Every day      ketoconazole (NIZORAL) 2 % shampoo Wash hair with medicated shampoo at least 2x/week - let sit on scalp at least 5 minutes prior to rinsing 120 mL 5    metoprolol tartrate (LOPRESSOR) 50 MG tablet TAKE 1 TABLET (50 MG TOTAL) BY MOUTH 2 (TWO) TIMES DAILY. 180 tablet 2    mirtazapine (REMERON) 15 MG tablet Take 15 mg by mouth every evening.  1    multivitamin (ONE DAILY MULTIVITAMIN) per tablet Take 1 tablet by mouth once daily.      ondansetron (ZOFRAN-ODT) 4 MG TbDL Take 1 tablet (4 mg total) by mouth every 6 (six) hours as needed (nausea). 20 tablet 0    potassium chloride 10% (KAYCIEL) 20 mEq/15 mL oral solution Take 7.5 mLs  (10 mEq total) by mouth once daily. 473 mL 0    rosuvastatin (CRESTOR) 20 MG tablet TAKE 1 TABLET BY MOUTH EVERY DAY 90 tablet 1    VENTOLIN HFA 90 mcg/actuation inhaler INHALE 1-2 PUFFS INTO THE LUNGS EVERY 4 (FOUR) HOURS AS NEEDED.  1     Current Facility-Administered Medications on File Prior to Visit   Medication Dose Route Frequency Provider Last Rate Last Dose    [COMPLETED] potassium bicarbonate disintegrating tablet 50 mEq  50 mEq Oral Once Blayne MD Neisha   50 mEq at 04/06/19 1420    [DISCONTINUED] 0.9%  NaCl infusion   Intravenous Continuous Cristi Padilla  mL/hr at 04/06/19 0900      [DISCONTINUED] acetaminophen tablet 650 mg  650 mg Oral Q4H PRN Sundeep Garza PA-C        [DISCONTINUED] albuterol-ipratropium 2.5 mg-0.5 mg/3 mL nebulizer solution 3 mL  3 mL Nebulization Q4H PRN Sundeep Garza PA-C        [DISCONTINUED] ALPRAZolam tablet 0.25 mg  0.25 mg Oral Nightly PRN Cristi Padilla MD   0.25 mg at 04/05/19 2022    [DISCONTINUED] amLODIPine tablet 2.5 mg  2.5 mg Oral Daily Cristi Padilla MD   2.5 mg at 04/06/19 0938    [DISCONTINUED] bisacodyl suppository 10 mg  10 mg Rectal Daily PRN Sundeep Garza PA-C        [DISCONTINUED] dextrose 50% injection 12.5 g  12.5 g Intravenous PRN Sundeep Garza PA-C        [DISCONTINUED] dextrose 50% injection 25 g  25 g Intravenous PRN Sundeep Gazra PA-C        [DISCONTINUED] glucagon (human recombinant) injection 1 mg  1 mg Intramuscular PRN Sundeep Garza PA-C        [DISCONTINUED] glucose chewable tablet 16 g  16 g Oral PRN Sundeep Garza PA-C        [DISCONTINUED] glucose chewable tablet 24 g  24 g Oral PRN Sundeep Garza PA-C        [DISCONTINUED] heparin (porcine) injection 5,000 Units  5,000 Units Subcutaneous Q8H Cristi Padilla MD        [DISCONTINUED] metoprolol tartrate (LOPRESSOR) tablet 50 mg  50 mg Oral BID Cristi Padilla MD   50 mg at 04/06/19 0938    [DISCONTINUED] mirtazapine tablet 15 mg  15 mg Oral QHS  Cristi Padilla MD   15 mg at 04/04/19 2040    [DISCONTINUED] ondansetron injection 8 mg  8 mg Intravenous Q8H PRN Cristi Padilla MD        [DISCONTINUED] ramelteon tablet 8 mg  8 mg Oral Nightly PRN Sundeep Garza PA-C   8 mg at 04/04/19 2040    [DISCONTINUED] rosuvastatin tablet 20 mg  20 mg Oral Daily Cristi Padilla MD   20 mg at 04/06/19 0938    [DISCONTINUED] sodium chloride 0.9% flush 10 mL  10 mL Intravenous PRN Cristi Padilla MD            Review of patient's allergies indicates:  No Known Allergies    Medications Reconciliation:   I have reconciled the patient's home medications and discharge medications with the patient/family. I have updated all changes.  Refer to After-Visit Medication List.    OBJECTIVE:     Vital Signs:  There were no vitals filed for this visit.  Wt Readings from Last 1 Encounters:   04/04/19 0400 58.4 kg (128 lb 12 oz)   04/03/19 0852 58.5 kg (129 lb)     There is no height or weight on file to calculate BMI.     Wt Readings from Last 3 Encounters:   04/10/19 59 kg (130 lb 1.1 oz)   04/04/19 58.4 kg (128 lb 12 oz)   02/18/19 57.6 kg (126 lb 15.8 oz)     Temp Readings from Last 3 Encounters:   04/06/19 98.4 °F (36.9 °C) (Oral)   02/02/19 97.4 °F (36.3 °C)   12/31/18 98.2 °F (36.8 °C)     BP Readings from Last 3 Encounters:   04/10/19 117/60   04/06/19 (!) 149/67   02/18/19 (!) 161/77     Pulse Readings from Last 3 Encounters:   04/10/19 74   04/06/19 (!) 56   02/18/19 89         Physical Exam:  General: Well developed, well nourished. No distress.  HEENT: Head is normocephalic, atraumatic; ears are normal.   Eyes: Clear conjunctiva.  Neck: Supple, symmetrical neck; trachea midline.  Lungs: Clear to auscultation bilaterally and normal respiratory effort.  Cardiovascular: Heart with regular rate and rhythm. No murmurs, gallops or rubs  Extremities: No LE edema. Pulses 2+ and symmetric.   Abdomen: Abdomen is soft, non-tender non-distended with normal bowel  "sounds.  Skin: Skin color, texture, turgor normal. No rashes.  Musculoskeletal: Normal gait.   Lymph Nodes: No cervical or supraclavicular adenopathy.  Neurologic: Normal strength and tone. No focal numbness or weakness.   Psychiatric: Not depressed.      Laboratory  Lab Results   Component Value Date    WBC 4.13 04/06/2019    HGB 12.1 04/06/2019    HCT 37.1 04/06/2019    PLT 71 (L) 04/06/2019    CHOL 178 08/27/2018    TRIG 93 08/27/2018    HDL 97 (H) 08/27/2018    ALT 32 04/06/2019    AST 43 (H) 04/06/2019     04/06/2019    K 3.1 (L) 04/06/2019     04/06/2019    CREATININE 4.0 (H) 04/06/2019    BUN 29 (H) 04/06/2019    CO2 29 04/06/2019    TSH 1.586 01/08/2019    INR 0.9 08/16/2015    HGBA1C 5.0 04/04/2019         ASSESSMENT & PLAN:     HIGH RISK CONDITION(S):        Recent admission for ARF (?? Etiology) with an Acute on Chronic Hypokalemia (??):   *Renal US: 4/4: - Hydronephrosis; + expected elevated resistive indices in relation to her CKD issue.   Baseline: 1.4-2.0  Admit: 4.2   Discharge: 4.0  ` will check Renal Markers today; but, needs timely follow up with Nephrology (followed Inpatient); not renally stable at time of her Inpatient discharge.  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   Underwent US: + hepatis Steatosis    (On note, at discharge: AST: wnl , ALT 43,   T Bili: wnl, Alk Phos: wnl)  ` Statin was resumed at discharge from IP        Hypertension:   *Goal: < " 130/90  Today: 117/60 (controlled)   ` continue Norvasc 2.5  ` continue Metoprolol 50 / 50  *Off the Ramipril in the setting of unstable renal function at time of discharge. Unable to be commenced to at this time.        ADjustment Disorder / Depression:   (stable)  ` continue Remeron (only takes PRN)        Hyperlipidemia:   ` started back on Statin prior to discharge from   Lab Results   Component Value Date    CHOL 178 08/27/2018    CHOL 251 (H) 01/26/2018    CHOL 207 (H) 06/19/2017     Lab Results   Component Value Date    HDL 97 (H) 08/27/2018    HDL 94 (H) 01/26/2018    HDL 67 06/19/2017     Lab Results   Component Value Date    LDLCALC 62.4 (L) 08/27/2018    LDLCALC 92.6 01/26/2018    LDLCALC 105.0 06/19/2017     Lab Results   Component Value Date    TRIG 93 08/27/2018    TRIG 322 (H) 01/26/2018    TRIG 175 (H) 06/19/2017     Lab Results   Component Value Date    CHOLHDL 54.5 (H) 08/27/2018    CHOLHDL 37.5 01/26/2018    CHOLHDL 32.4 06/19/2017         Future Appointments   Date Time Provider Department Center   5/9/2019  1:30 PM [facility]   7/30/2019  7:30 AM Kristina Orozco [facility] [place]          Medication List           Accurate as of 4/10/19  4:33 PM. If you have any questions, ask your nurse or doctor.               CONTINUE taking these medications    ALPRAZolam 0.25 MG tablet  Commonly known as:  XANAX  TAKE 1/2 TO 1 TABLET BY MOUTH BEFORE FLIGHT     amLODIPine 2.5 MG tablet  Commonly known as:  NORVASC  Take 1 tablet (2.5 mg total) by mouth once daily.     co-enzyme Q-10 30 mg capsule     fexofenadine 180 MG tablet  Commonly known as:  ALLEGRA     ketoconazole 2 % shampoo  Commonly known as:  NIZORAL  Wash hair with medicated shampoo at least 2x/week - let sit on scalp at least 5 minutes prior to rinsing     metoprolol tartrate 50 MG tablet  Commonly known as:  LOPRESSOR  TAKE 1 TABLET (50 MG TOTAL) BY MOUTH 2 (TWO) TIMES DAILY.     mirtazapine  15 MG tablet  Commonly known as:  REMERON     ondansetron 4 MG Tbdl  Commonly known as:  ZOFRAN-ODT  Take 1 tablet (4 mg total) by mouth every 6 (six) hours as needed (nausea).     ONE DAILY MULTIVITAMIN per tablet  Generic drug:  multivitamin     potassium chloride 10% 20 mEq/15 mL oral solution  Commonly known as:  KAYCIEL  Take 7.5 mLs (10 mEq total) by mouth once daily.     rosuvastatin 20 MG tablet  Commonly known as:  CRESTOR  TAKE 1 TABLET BY MOUTH EVERY DAY     * VENTOLIN HFA 90 mcg/actuation inhaler  Generic drug:  albuterol     * albuterol 90 mcg/actuation inhaler  Commonly known as:  VENTOLIN HFA  INHALE 1-2 PUFFS INTO THE LUNGS EVERY 4 (FOUR) HOURS AS NEEDED.         * This list has 2 medication(s) that are the same as other medications prescribed for you. Read the directions carefully, and ask your doctor or other care provider to review them with you.                Signing Physician:  KATIA Quinones

## 2019-04-08 LAB
ANA SER QL IF: POSITIVE
ANA TITR SER IF: NORMAL {TITER}
ANCA AB TITR SER IF: NORMAL TITER
DSDNA AB SER-ACNC: NORMAL [IU]/ML
HAV IGM SERPL QL IA: NEGATIVE
HBV CORE IGM SERPL QL IA: NEGATIVE
HBV SURFACE AG SERPL QL IA: NEGATIVE
HCV AB SERPL QL IA: NEGATIVE
P-ANCA TITR SER IF: NORMAL TITER

## 2019-04-09 LAB
ANTI SM ANTIBODY: 0.09 EU (ref 0–19.99)
ANTI SM/RNP ANTIBODY: 0.52 EU (ref 0–19.99)
ANTI-SM INTERPRETATION: NEGATIVE
ANTI-SM/RNP INTERPRETATION: NEGATIVE
ANTI-SSA ANTIBODY: 0.27 EU (ref 0–19.99)
ANTI-SSA INTERPRETATION: NEGATIVE
ANTI-SSB ANTIBODY: 0 EU (ref 0–19.99)
ANTI-SSB INTERPRETATION: NEGATIVE
DSDNA AB SER-ACNC: NORMAL [IU]/ML

## 2019-04-10 ENCOUNTER — OFFICE VISIT (OUTPATIENT)
Dept: PRIMARY CARE CLINIC | Facility: CLINIC | Age: 74
End: 2019-04-10
Payer: MEDICARE

## 2019-04-10 ENCOUNTER — TELEPHONE (OUTPATIENT)
Dept: INTERNAL MEDICINE | Facility: CLINIC | Age: 74
End: 2019-04-10

## 2019-04-10 ENCOUNTER — LAB VISIT (OUTPATIENT)
Dept: LAB | Facility: HOSPITAL | Age: 74
End: 2019-04-10
Payer: MEDICARE

## 2019-04-10 VITALS
OXYGEN SATURATION: 94 % | DIASTOLIC BLOOD PRESSURE: 60 MMHG | HEART RATE: 74 BPM | SYSTOLIC BLOOD PRESSURE: 117 MMHG | HEIGHT: 67 IN | BODY MASS INDEX: 20.41 KG/M2 | WEIGHT: 130.06 LBS

## 2019-04-10 DIAGNOSIS — I10 ESSENTIAL HYPERTENSION: ICD-10-CM

## 2019-04-10 DIAGNOSIS — E87.6 HYPOKALEMIA: ICD-10-CM

## 2019-04-10 DIAGNOSIS — N18.3 ACUTE RENAL FAILURE SUPERIMPOSED ON STAGE 3 CHRONIC KIDNEY DISEASE, UNSPECIFIED ACUTE RENAL FAILURE TYPE: ICD-10-CM

## 2019-04-10 DIAGNOSIS — N17.9 ACUTE RENAL FAILURE SUPERIMPOSED ON STAGE 3 CHRONIC KIDNEY DISEASE, UNSPECIFIED ACUTE RENAL FAILURE TYPE: ICD-10-CM

## 2019-04-10 DIAGNOSIS — N17.9 ACUTE RENAL FAILURE SUPERIMPOSED ON STAGE 3 CHRONIC KIDNEY DISEASE, UNSPECIFIED ACUTE RENAL FAILURE TYPE: Primary | ICD-10-CM

## 2019-04-10 DIAGNOSIS — E78.5 HYPERLIPIDEMIA, UNSPECIFIED HYPERLIPIDEMIA TYPE: ICD-10-CM

## 2019-04-10 DIAGNOSIS — R82.90 ABNORMAL FINDING IN URINE: ICD-10-CM

## 2019-04-10 DIAGNOSIS — N18.3 ACUTE RENAL FAILURE SUPERIMPOSED ON STAGE 3 CHRONIC KIDNEY DISEASE, UNSPECIFIED ACUTE RENAL FAILURE TYPE: Primary | ICD-10-CM

## 2019-04-10 LAB
ANION GAP SERPL CALC-SCNC: 7 MMOL/L (ref 8–16)
BACTERIA #/AREA URNS AUTO: NORMAL /HPF
BILIRUB UR QL STRIP: NEGATIVE
BUN SERPL-MCNC: 23 MG/DL (ref 8–23)
CALCIUM SERPL-MCNC: 9.6 MG/DL (ref 8.7–10.5)
CHLORIDE SERPL-SCNC: 100 MMOL/L (ref 95–110)
CLARITY UR REFRACT.AUTO: ABNORMAL
CO2 SERPL-SCNC: 29 MMOL/L (ref 23–29)
COLOR UR AUTO: YELLOW
CREAT SERPL-MCNC: 2.6 MG/DL (ref 0.5–1.4)
EST. GFR  (AFRICAN AMERICAN): 20 ML/MIN/1.73 M^2
EST. GFR  (NON AFRICAN AMERICAN): 18 ML/MIN/1.73 M^2
GLUCOSE SERPL-MCNC: 106 MG/DL (ref 70–110)
GLUCOSE UR QL STRIP: NEGATIVE
HGB UR QL STRIP: ABNORMAL
KETONES UR QL STRIP: NEGATIVE
LEUKOCYTE ESTERASE UR QL STRIP: NEGATIVE
MICROSCOPIC COMMENT: NORMAL
NITRITE UR QL STRIP: NEGATIVE
PH UR STRIP: 6 [PH] (ref 5–8)
POTASSIUM SERPL-SCNC: 4.2 MMOL/L (ref 3.5–5.1)
PROT UR QL STRIP: NEGATIVE
RBC #/AREA URNS AUTO: 0 /HPF (ref 0–4)
SODIUM SERPL-SCNC: 136 MMOL/L (ref 136–145)
SP GR UR STRIP: 1.01 (ref 1–1.03)
URN SPEC COLLECT METH UR: ABNORMAL
WBC #/AREA URNS AUTO: 2 /HPF (ref 0–5)

## 2019-04-10 PROCEDURE — 3074F PR MOST RECENT SYSTOLIC BLOOD PRESSURE < 130 MM HG: ICD-10-PCS | Mod: CPTII,S$GLB,, | Performed by: NURSE PRACTITIONER

## 2019-04-10 PROCEDURE — 99499 RISK ADDL DX/OHS AUDIT: ICD-10-PCS | Mod: S$GLB,,, | Performed by: NURSE PRACTITIONER

## 2019-04-10 PROCEDURE — 1101F PT FALLS ASSESS-DOCD LE1/YR: CPT | Mod: CPTII,S$GLB,, | Performed by: NURSE PRACTITIONER

## 2019-04-10 PROCEDURE — 99999 PR PBB SHADOW E&M-EST. PATIENT-LVL IV: CPT | Mod: PBBFAC,,, | Performed by: NURSE PRACTITIONER

## 2019-04-10 PROCEDURE — 80048 BASIC METABOLIC PNL TOTAL CA: CPT

## 2019-04-10 PROCEDURE — 1101F PR PT FALLS ASSESS DOC 0-1 FALLS W/OUT INJ PAST YR: ICD-10-PCS | Mod: CPTII,S$GLB,, | Performed by: NURSE PRACTITIONER

## 2019-04-10 PROCEDURE — 99214 OFFICE O/P EST MOD 30 MIN: CPT | Mod: S$GLB,,, | Performed by: NURSE PRACTITIONER

## 2019-04-10 PROCEDURE — 3074F SYST BP LT 130 MM HG: CPT | Mod: CPTII,S$GLB,, | Performed by: NURSE PRACTITIONER

## 2019-04-10 PROCEDURE — 87086 URINE CULTURE/COLONY COUNT: CPT

## 2019-04-10 PROCEDURE — 99999 PR PBB SHADOW E&M-EST. PATIENT-LVL IV: ICD-10-PCS | Mod: PBBFAC,,, | Performed by: NURSE PRACTITIONER

## 2019-04-10 PROCEDURE — 3078F PR MOST RECENT DIASTOLIC BLOOD PRESSURE < 80 MM HG: ICD-10-PCS | Mod: CPTII,S$GLB,, | Performed by: NURSE PRACTITIONER

## 2019-04-10 PROCEDURE — 99499 UNLISTED E&M SERVICE: CPT | Mod: S$GLB,,, | Performed by: NURSE PRACTITIONER

## 2019-04-10 PROCEDURE — 36415 COLL VENOUS BLD VENIPUNCTURE: CPT

## 2019-04-10 PROCEDURE — 99214 PR OFFICE/OUTPT VISIT, EST, LEVL IV, 30-39 MIN: ICD-10-PCS | Mod: S$GLB,,, | Performed by: NURSE PRACTITIONER

## 2019-04-10 PROCEDURE — 3078F DIAST BP <80 MM HG: CPT | Mod: CPTII,S$GLB,, | Performed by: NURSE PRACTITIONER

## 2019-04-10 PROCEDURE — 81001 URINALYSIS AUTO W/SCOPE: CPT

## 2019-04-10 NOTE — TELEPHONE ENCOUNTER
----- Message from Lynda Gee sent at 4/10/2019  4:18 PM CDT -----  Pt needs to be scheduled for a 1 week follow up. Please call pt to schedule.    Thank you

## 2019-04-11 ENCOUNTER — TELEPHONE (OUTPATIENT)
Dept: INTERNAL MEDICINE | Facility: CLINIC | Age: 74
End: 2019-04-11

## 2019-04-11 ENCOUNTER — PATIENT MESSAGE (OUTPATIENT)
Dept: INTERNAL MEDICINE | Facility: CLINIC | Age: 74
End: 2019-04-11

## 2019-04-11 ENCOUNTER — PES CALL (OUTPATIENT)
Dept: ADMINISTRATIVE | Facility: CLINIC | Age: 74
End: 2019-04-11

## 2019-04-11 DIAGNOSIS — N17.9 AKI (ACUTE KIDNEY INJURY): Primary | ICD-10-CM

## 2019-04-11 NOTE — TELEPHONE ENCOUNTER
Patient was seen by   KATIA Pittman   Nurse Practitioner   Internal Medicine     Was told she needs to follow up with pcp for blood work, patient is scheduled to see pcp in July, alexi wants to know if she needs to be seen sooner or can blood work just be ordered      Please Advise  Thank You

## 2019-04-12 ENCOUNTER — TELEPHONE (OUTPATIENT)
Dept: INTERNAL MEDICINE | Facility: CLINIC | Age: 74
End: 2019-04-12

## 2019-04-12 LAB
BACTERIA UR CULT: NORMAL
BACTERIA UR CULT: NORMAL

## 2019-04-12 RX ORDER — METOPROLOL TARTRATE 50 MG/1
TABLET ORAL
Qty: 180 TABLET | Refills: 2 | Status: SHIPPED | OUTPATIENT
Start: 2019-04-12 | End: 2020-01-09

## 2019-04-12 NOTE — TELEPHONE ENCOUNTER
BMP  Lab Results   Component Value Date     04/10/2019    K 4.2 04/10/2019     04/10/2019    CO2 29 04/10/2019    BUN 23 04/10/2019    CREATININE 2.6 (H) 04/10/2019    CALCIUM 9.6 04/10/2019    ANIONGAP 7 (L) 04/10/2019    ESTGFRAFRICA 20 (A) 04/10/2019    EGFRNONAA 18 (A) 04/10/2019     Serum creat has improved.   2.6 from 4.0 at discharge.     Plan to repeat the level in 2 weeks.   Apt with Neph on 5/9

## 2019-04-12 NOTE — TELEPHONE ENCOUNTER
----- Message from Jaye Ramsey sent at 4/12/2019  9:08 AM CDT -----  Contact: [phone]  disregard ordering  the blood work Np already order  . Please advise, Thanks

## 2019-04-22 ENCOUNTER — LAB VISIT (OUTPATIENT)
Dept: LAB | Facility: HOSPITAL | Age: 74
End: 2019-04-22
Payer: MEDICARE

## 2019-04-22 ENCOUNTER — PATIENT MESSAGE (OUTPATIENT)
Dept: INTERNAL MEDICINE | Facility: CLINIC | Age: 74
End: 2019-04-22

## 2019-04-22 ENCOUNTER — TELEPHONE (OUTPATIENT)
Dept: INTERNAL MEDICINE | Facility: CLINIC | Age: 74
End: 2019-04-22

## 2019-04-22 DIAGNOSIS — N17.9 AKI (ACUTE KIDNEY INJURY): ICD-10-CM

## 2019-04-22 LAB
ALBUMIN SERPL BCP-MCNC: 3.6 G/DL (ref 3.5–5.2)
ANION GAP SERPL CALC-SCNC: 11 MMOL/L (ref 8–16)
BUN SERPL-MCNC: 13 MG/DL (ref 8–23)
CALCIUM SERPL-MCNC: 9.8 MG/DL (ref 8.7–10.5)
CHLORIDE SERPL-SCNC: 101 MMOL/L (ref 95–110)
CO2 SERPL-SCNC: 28 MMOL/L (ref 23–29)
CREAT SERPL-MCNC: 0.9 MG/DL (ref 0.5–1.4)
EST. GFR  (AFRICAN AMERICAN): >60 ML/MIN/1.73 M^2
EST. GFR  (NON AFRICAN AMERICAN): >60 ML/MIN/1.73 M^2
GLUCOSE SERPL-MCNC: 91 MG/DL (ref 70–110)
PHOSPHATE SERPL-MCNC: 3.7 MG/DL (ref 2.7–4.5)
POTASSIUM SERPL-SCNC: 3.5 MMOL/L (ref 3.5–5.1)
SODIUM SERPL-SCNC: 140 MMOL/L (ref 136–145)

## 2019-04-22 PROCEDURE — 36415 COLL VENOUS BLD VENIPUNCTURE: CPT

## 2019-04-22 PROCEDURE — 80069 RENAL FUNCTION PANEL: CPT

## 2019-04-22 NOTE — TELEPHONE ENCOUNTER
BMP  Lab Results   Component Value Date     04/22/2019    K 3.5 04/22/2019     04/22/2019    CO2 28 04/22/2019    BUN 13 04/22/2019    CREATININE 0.9 04/22/2019    CALCIUM 9.8 04/22/2019    ANIONGAP 11 04/22/2019    ESTGFRAFRICA >60.0 04/22/2019    EGFRNONAA >60.0 04/22/2019     Have shared results via the portal with portal.   All have normalized.   No changes.   Apt with Nephro  5/9

## 2019-04-25 ENCOUNTER — PATIENT MESSAGE (OUTPATIENT)
Dept: INTERNAL MEDICINE | Facility: CLINIC | Age: 74
End: 2019-04-25

## 2019-04-25 RX ORDER — POTASSIUM CHLORIDE 20 MEQ/15ML
10 SOLUTION ORAL DAILY
Qty: 473 ML | Refills: 0 | Status: CANCELLED | OUTPATIENT
Start: 2019-04-25

## 2019-04-27 RX ORDER — OMEPRAZOLE 20 MG/1
CAPSULE, DELAYED RELEASE ORAL
Qty: 90 CAPSULE | Refills: 1 | Status: SHIPPED | OUTPATIENT
Start: 2019-04-27 | End: 2019-05-09

## 2019-04-29 ENCOUNTER — TELEPHONE (OUTPATIENT)
Dept: NEPHROLOGY | Facility: CLINIC | Age: 74
End: 2019-04-29

## 2019-04-29 ENCOUNTER — PATIENT MESSAGE (OUTPATIENT)
Dept: INTERNAL MEDICINE | Facility: CLINIC | Age: 74
End: 2019-04-29

## 2019-04-29 DIAGNOSIS — N17.9 AKI (ACUTE KIDNEY INJURY): Primary | ICD-10-CM

## 2019-04-29 NOTE — TELEPHONE ENCOUNTER
I need a refill on the liquid potassium that Dr. Benz gave me on 4/6/19 when I was discharged from the hospital. It is Potassium Chloride 10% (20 MEQ/15 ml). I'm taking 7.5 mls by mouth daily.   Thanks!   [facility]

## 2019-04-30 RX ORDER — POTASSIUM CHLORIDE 20 MEQ/15ML
10 SOLUTION ORAL DAILY
Qty: 473 ML | Refills: 0 | Status: SHIPPED | OUTPATIENT
Start: 2019-04-30 | End: 2019-05-10

## 2019-04-30 NOTE — TELEPHONE ENCOUNTER
----- Message from Enrique Rosa sent at 4/30/2019  1:24 PM CDT -----  Contact: Patient [phone]  RX request - refill or new RX.  Is this a refill or new RX: Refill    RX name and strength: potassium chloride 10% (KAYCIEL) 20 mEq/15 mL oral solution      Pharmacy name and phone # [id number] - [place], [place] - [address] AT [facility] [phone] (Phone) [phone] (Fax)    Please call an advise  Thank you

## 2019-05-01 ENCOUNTER — PATIENT MESSAGE (OUTPATIENT)
Dept: INTERNAL MEDICINE | Facility: CLINIC | Age: 74
End: 2019-05-01

## 2019-05-01 RX ORDER — POTASSIUM CHLORIDE 20 MEQ/15ML
10 SOLUTION ORAL DAILY
Qty: 473 ML | Refills: 0 | Status: SHIPPED | OUTPATIENT
Start: 2019-05-01 | End: 2019-06-19 | Stop reason: SDUPTHER

## 2019-05-01 NOTE — TELEPHONE ENCOUNTER
----- Message from Slime Velasco sent at 5/1/2019  9:08 AM CDT -----  Contact: [phone]  Patient is calling for an RX refill or new RX.  Is this a refill or new RX:  New   RX name and strength: potassium chloride 10% (KAYCIEL) 20 mEq/15 mL oral solution    Local pharmacy or mail order pharmacy:  [id number] - [place], [place] - [address] AT [facility]   [phone] (Phone)  [phone] (Fax)       Comments:  Patient is requesting the liquid instead of the pill.  Please advise, thanks

## 2019-05-02 RX ORDER — ALPRAZOLAM 0.25 MG/1
TABLET ORAL
Qty: 20 TABLET | Refills: 0 | Status: CANCELLED | OUTPATIENT
Start: 2019-05-02

## 2019-05-03 ENCOUNTER — TELEPHONE (OUTPATIENT)
Dept: NEPHROLOGY | Facility: CLINIC | Age: 74
End: 2019-05-03

## 2019-05-06 RX ORDER — RAMIPRIL 10 MG/1
10 CAPSULE ORAL DAILY
COMMUNITY
End: 2019-05-10

## 2019-05-06 NOTE — TELEPHONE ENCOUNTER
----- Message from Jaye Ramsey sent at 5/6/2019  1:04 PM CDT -----  Contact: [phone]  Patient  Is  calling to check status of medication ramipril (ALTACE) 10 MG capsule and   ALPRAZolam (XANAX) 0.25 MG tablet   . Please call and advise, Thanks

## 2019-05-07 ENCOUNTER — LAB VISIT (OUTPATIENT)
Dept: LAB | Facility: HOSPITAL | Age: 74
End: 2019-05-07
Attending: INTERNAL MEDICINE
Payer: MEDICARE

## 2019-05-07 DIAGNOSIS — N17.9 AKI (ACUTE KIDNEY INJURY): ICD-10-CM

## 2019-05-07 LAB
ALBUMIN SERPL BCP-MCNC: 3.8 G/DL (ref 3.5–5.2)
ANION GAP SERPL CALC-SCNC: 8 MMOL/L (ref 8–16)
BUN SERPL-MCNC: 13 MG/DL (ref 8–23)
CALCIUM SERPL-MCNC: 10 MG/DL (ref 8.7–10.5)
CHLORIDE SERPL-SCNC: 103 MMOL/L (ref 95–110)
CO2 SERPL-SCNC: 28 MMOL/L (ref 23–29)
CREAT SERPL-MCNC: 0.9 MG/DL (ref 0.5–1.4)
EST. GFR  (AFRICAN AMERICAN): >60 ML/MIN/1.73 M^2
EST. GFR  (NON AFRICAN AMERICAN): >60 ML/MIN/1.73 M^2
GLUCOSE SERPL-MCNC: 103 MG/DL (ref 70–110)
PHOSPHATE SERPL-MCNC: 4.2 MG/DL (ref 2.7–4.5)
POTASSIUM SERPL-SCNC: 4.1 MMOL/L (ref 3.5–5.1)
SODIUM SERPL-SCNC: 139 MMOL/L (ref 136–145)

## 2019-05-07 PROCEDURE — 80069 RENAL FUNCTION PANEL: CPT

## 2019-05-07 PROCEDURE — 36415 COLL VENOUS BLD VENIPUNCTURE: CPT

## 2019-05-07 RX ORDER — RAMIPRIL 10 MG/1
10 CAPSULE ORAL 2 TIMES DAILY
Qty: 180 CAPSULE | Refills: 0 | OUTPATIENT
Start: 2019-05-07

## 2019-05-07 NOTE — TELEPHONE ENCOUNTER
----- Message from Jaye Ramsey sent at 5/7/2019 12:26 PM CDT -----  Contact: [phone]  Type: Rx    Name of medication(s):  ramipril (ALTACE) 10 MG capsule and  ALPRAZolam (XANAX) 0.25 MG tablet    Is this a refill? New rx? Refill       Who prescribed medication?    Pharmacy Name, Phone, & Location: [facility] pharmacy      Comments: please call and advise, Thanks

## 2019-05-09 ENCOUNTER — TELEPHONE (OUTPATIENT)
Dept: OPHTHALMOLOGY | Facility: CLINIC | Age: 74
End: 2019-05-09

## 2019-05-09 ENCOUNTER — OFFICE VISIT (OUTPATIENT)
Dept: NEPHROLOGY | Facility: CLINIC | Age: 74
End: 2019-05-09
Payer: MEDICARE

## 2019-05-09 VITALS
SYSTOLIC BLOOD PRESSURE: 130 MMHG | OXYGEN SATURATION: 98 % | BODY MASS INDEX: 20.65 KG/M2 | WEIGHT: 131.81 LBS | DIASTOLIC BLOOD PRESSURE: 60 MMHG | TEMPERATURE: 98 F

## 2019-05-09 DIAGNOSIS — I10 ESSENTIAL HYPERTENSION: ICD-10-CM

## 2019-05-09 DIAGNOSIS — N18.3 ACUTE RENAL FAILURE SUPERIMPOSED ON STAGE 3 CHRONIC KIDNEY DISEASE, UNSPECIFIED ACUTE RENAL FAILURE TYPE: Primary | ICD-10-CM

## 2019-05-09 DIAGNOSIS — N17.9 ACUTE RENAL FAILURE SUPERIMPOSED ON STAGE 3 CHRONIC KIDNEY DISEASE, UNSPECIFIED ACUTE RENAL FAILURE TYPE: Primary | ICD-10-CM

## 2019-05-09 DIAGNOSIS — E87.6 HYPOKALEMIA: ICD-10-CM

## 2019-05-09 PROCEDURE — 3078F DIAST BP <80 MM HG: CPT | Mod: CPTII,GC,S$GLB, | Performed by: GENERAL PRACTICE

## 2019-05-09 PROCEDURE — 3075F PR MOST RECENT SYSTOLIC BLOOD PRESS GE 130-139MM HG: ICD-10-PCS | Mod: CPTII,GC,S$GLB, | Performed by: GENERAL PRACTICE

## 2019-05-09 PROCEDURE — 1101F PR PT FALLS ASSESS DOC 0-1 FALLS W/OUT INJ PAST YR: ICD-10-PCS | Mod: CPTII,GC,S$GLB, | Performed by: GENERAL PRACTICE

## 2019-05-09 PROCEDURE — 99499 RISK ADDL DX/OHS AUDIT: ICD-10-PCS | Mod: S$GLB,,, | Performed by: GENERAL PRACTICE

## 2019-05-09 PROCEDURE — 3078F PR MOST RECENT DIASTOLIC BLOOD PRESSURE < 80 MM HG: ICD-10-PCS | Mod: CPTII,GC,S$GLB, | Performed by: GENERAL PRACTICE

## 2019-05-09 PROCEDURE — 99213 PR OFFICE/OUTPT VISIT, EST, LEVL III, 20-29 MIN: ICD-10-PCS | Mod: GC,S$GLB,, | Performed by: GENERAL PRACTICE

## 2019-05-09 PROCEDURE — 99213 OFFICE O/P EST LOW 20 MIN: CPT | Mod: GC,S$GLB,, | Performed by: GENERAL PRACTICE

## 2019-05-09 PROCEDURE — 3075F SYST BP GE 130 - 139MM HG: CPT | Mod: CPTII,GC,S$GLB, | Performed by: GENERAL PRACTICE

## 2019-05-09 PROCEDURE — 99499 UNLISTED E&M SERVICE: CPT | Mod: S$GLB,,, | Performed by: GENERAL PRACTICE

## 2019-05-09 PROCEDURE — 1101F PT FALLS ASSESS-DOCD LE1/YR: CPT | Mod: CPTII,GC,S$GLB, | Performed by: GENERAL PRACTICE

## 2019-05-09 PROCEDURE — 99999 PR PBB SHADOW E&M-EST. PATIENT-LVL IV: ICD-10-PCS | Mod: PBBFAC,GC,, | Performed by: GENERAL PRACTICE

## 2019-05-09 PROCEDURE — 99999 PR PBB SHADOW E&M-EST. PATIENT-LVL IV: CPT | Mod: PBBFAC,GC,, | Performed by: GENERAL PRACTICE

## 2019-05-09 NOTE — PROGRESS NOTES
Nephrology Clinic Progress Note    Patient ID: Jacqueline O Favret is a 73 y.o. White female who presents for Initial evaluation at Nephrology clinics.    HPI:  Jacqueline O Favret is a 73 y.o. White female with CKD 2-3a, baseline sCr between 1-1.3, HTN, dyslipidemia, CAD (MI 1991), who presents to Nephrology clinics after being hospitalized on April 2019 where she was found to have acute kidney injury, sCr in the 4s (initially 4.2, with hypokalemia and hypochloremia).  She had 5 days of increased bowel movements, nausea, diarrhea, decreased appetite 1week prior to being admitted at the hospital.   Most recent sCr down to 0.9, even better than her baseline.      The patient denies taking NSAIDs or new antibiotics, recreational drugs, recent episode of dehydration, diarrhea, nausea or vomiting, acute illness, hospitalization or exposure to IV radiocontrast.    Past Medical History:   Diagnosis Date    Abnormal liver enzymes 4/6/2015    Acute on chronic kidney disease, stage 3 4/18/2013    Alcohol-induced acute pancreatitis 8/16/2015    Anemia     Basal cell carcinoma 1985    nose    Bunion, right foot 12/14/2012    Compression fracture 11/14/2013    MI (myocardial infarction) 1991    PAF (paroxysmal atrial fibrillation) 9/8/2015    During acute illness     SCC (squamous cell carcinoma) 05/11/2016    in situ left lower lip, nasal bridge       Family History   Problem Relation Age of Onset    Diabetes Mother     Heart disease Father 57        sudden death    Celiac disease Neg Hx     Colon cancer Neg Hx     Crohn's disease Neg Hx     Esophageal cancer Neg Hx     Inflammatory bowel disease Neg Hx     Irritable bowel syndrome Neg Hx     Liver cancer Neg Hx     Rectal cancer Neg Hx     Stomach cancer Neg Hx     Ulcerative colitis Neg Hx     Melanoma Neg Hx        Past Surgical History:   Procedure Laterality Date    blephroplasty      BREAST BIOPSY Right     excisional 1985    BUNIONECTOMY  Roger  2013    right foot    CARDIAC CATHETERIZATION      COLONOSCOPY N/A 12/21/2016    [facility]formed by [facility]oval [facility] ([facility] FLR)    COLONOSCOPY N/A 9/20/2016    [facility] by Rachelle [facility]urera [facility] at [facility] (4TH FLR)    EGD (ESOPHAGOGASTRODUODENOSCOPY) N/A 7/19/2018    Performed by Jefferson Mina MD at [facility] (4TH FLR)    FOOT SURGERY      right foot    HYSTERECTOMY  1980s    bleeding    OPEN REDUCTION INTERNAL FIXATION-ORBITAL FRACTURE Right 1/23/2015    [facility]ur [facility] at [facility] OR 2ND FLR    OPEN REDUCTION INTERNAL FIXATION-ORBITAL FRACTURE Right 10/22/2014    [facility] at [facility] OR 2ND FLR    orbital fx      REPAIR-ECTROPION Bilateral 3/29/2016    Performed by Lisa Gao [facility] at [facility] OR 1ST FLR    REPAIR-PTOSIS Bilateral 3/29/2016    [facility]formed by [facility] at [facility]ST FLR    TONSILLECTOMY      VAGINA SURGERY           Current Outpatient Medications:     albuterol (VENTOLIN HFA) 90 mcg/actuation inhaler, INHALE 1-2 PUFFS INTO THE LUNGS EVERY 4 (FOUR) HOURS AS NEEDED., Disp: 1 Inhaler, Rfl: 3    ALPRAZolam (XANAX) 0.25 MG tablet, TAKE 1/2 TO 1 TABLET BY MOUTH BEFORE FLIGHT, Disp: 20 tablet, Rfl: 0    amLODIPine (NORVASC) 2.5 MG tablet, Take 1 tablet (2.5 mg total) by mouth once daily., Disp: 90 tablet, Rfl: 3    co-enzyme Q-10 30 mg capsule, Take 30 mg by mouth once daily. , Disp: , Rfl:     fexofenadine (ALLEGRA) 180 MG tablet, Take by mouth daily as needed. 1 Tablet Oral Every day, Disp: , Rfl:     metoprolol tartrate (LOPRESSOR) 50 MG tablet, TAKE 1 TABLET BY MOUTH TWICE A DAY, Disp: 180 tablet, Rfl: 2    mirtazapine (REMERON) 15 MG tablet, Take 15 mg by mouth every evening., Disp: , Rfl: 1    multivitamin (ONE DAILY MULTIVITAMIN) per tablet, Take 1 tablet by mouth once daily., Disp: , Rfl:     ondansetron (ZOFRAN-ODT) 4 MG TbDL, Take 1 tablet (4 mg total) by mouth every 6 (six) hours as needed (nausea)., Disp: 20 tablet,  Rfl: 0    potassium chloride 10% (KAYCIEL) 20 mEq/15 mL oral solution, Take 7.5 mLs (10 mEq total) by mouth once daily., Disp: 473 mL, Rfl: 0    rosuvastatin (CRESTOR) 20 MG tablet, TAKE 1 TABLET BY MOUTH EVERY DAY, Disp: 90 tablet, Rfl: 1    VENTOLIN HFA 90 mcg/actuation inhaler, INHALE 1-2 PUFFS INTO THE LUNGS EVERY 4 (FOUR) HOURS AS NEEDED., Disp: , Rfl: 1    ketoconazole (NIZORAL) 2 % shampoo, Wash hair with medicated shampoo at least 2x/week - let sit on scalp at least 5 minutes prior to rinsing, Disp: 120 mL, Rfl: 5    potassium chloride 10% (KAYCIEL) 20 mEq/15 mL oral solution, TAKE 7.5 MLS (10 MEQ TOTAL) BY MOUTH ONCE DAILY., Disp: 473 mL, Rfl: 0    ramipril (ALTACE) 10 MG capsule, Take 10 mg by mouth once daily., Disp: , Rfl:     Patient's medical, family, surgical, and medication hx reviewed.    Review of Systems    Constitutional: Negative for chills, diaphoresis, fever and weight loss.   HENT: Negative for nosebleeds and tinnitus.    Eyes: Negative for blurred vision, double vision and photophobia.   Respiratory: Negative for cough and shortness of breath.    Cardiovascular: . Negative for chest pain, palpitations, swelling orthopnea and PND.   Gastrointestinal: Negative for abdominal pain, diarrhea, constipation nausea and vomiting.   Genitourinary: Negative for dysuria, flank pain, frequency, hematuria and urgency.   Musculoskeletal: Negative for back pain, falls, joint pain, myalgias and neck pain.   Skin: Negative.    Neurological: Negative for dizziness, tingling, tremors, sensory change, speech change, focal weakness, seizures, loss of consciousness, weakness and headaches.   Endo/Heme/Allergies: Negative for environmental allergies and polydipsia. Does not bruise/bleed easily.   Psychiatric/Behavioral: Negative for depression, hallucinations, memory loss, substance abuse and suicidal ideas. The patient is not nervous/anxious and does not have insomnia.        Objective:     Wt Readings from  Last 3 Encounters:   05/09/19 59.8 kg (131 lb 13.4 oz)   04/10/19 59 kg (130 lb 1.1 oz)   04/04/19 58.4 kg (128 lb 12 oz)     Temp Readings from Last 3 Encounters:   05/09/19 98 °F (36.7 °C)   04/06/19 98.4 °F (36.9 °C) (Oral)   02/02/19 97.4 °F (36.3 °C)     BP Readings from Last 3 Encounters:   05/09/19 130/60   04/10/19 117/60   04/06/19 (!) 149/67     Pulse Readings from Last 3 Encounters:   04/10/19 74   04/06/19 (!) 56   02/18/19 89       Physical Exam    Constitutional:  well-developed and well-nourished. No distress.   HENT:   Head: Normocephalic and atraumatic.   Neck: Normal range of motion. Neck supple.   Cardiovascular: Normal rate, regular rhythm, normal heart sounds and intact distal pulses.  Exam reveals no gallop and no friction rub.    No murmur heard.  Pulmonary/Chest: Effort normal and breath sounds normal. No respiratory distress. no wheezes, no rales, no tenderness.   Abdominal: Soft. Bowel sounds are normal, no distension. There is no tenderness. There is no rebound and no guarding.   Musculoskeletal: Normal range of motion. No edema or deformity.   Neurological: Awake alert and oriented x 4. Motor strength preserved 5/5. DTR symmetrical.  Skin: Skin is warm and dry. No rash noted. She is not diaphoretic. No erythema. No pallor.       Assessment:         ICD-10-CM ICD-9-CM    1. Acute renal failure superimposed on stage 3 chronic kidney disease, unspecified acute renal failure type N17.9 584.9     N18.3 585.3    2. Hypokalemia E87.6 276.8    3. Essential hypertension I10 401.9         Plan:   1. Acute kidney Injury on CKD stage 2, no proteinuria:   - Patient with episode of SHYANNE at hospital admission 4/2019, which has resolved (likely prerenal ischemic ATN from poor oral intake, N/V, increased stool output)  - Patient down to baseline  Baseline Creatine: 1-1.3  Lab Results   Component Value Date    CREATININE 0.9 05/07/2019       Urine Protein:   Prot/Creat Ratio, Ur   Date Value Ref Range  Status   05/07/2019 0.12 0.00 - 0.20 Final   04/04/2019 1.39 (H) 0.00 - 0.20 Final       Acid-Base:   Lab Results   Component Value Date     05/07/2019    K 4.1 05/07/2019    CO2 28 05/07/2019       2. HTN:   Blood pressures stable without taking Ramipril (was held after the acute kidney injury hospitalization on April 2019)  Can restart Ramipril if needed for blood pressure control, as her acute kidney injury has resolved.    3. Lipid management:   - Stable on rosuvastatin  Lab Results   Component Value Date    LDLCALC 62.4 (L) 08/27/2018     Can follow with PCP.  If any kidney problems arise, can return to clinic.    Ze Morales MD  Nephrology  [facility]

## 2019-05-10 ENCOUNTER — OFFICE VISIT (OUTPATIENT)
Dept: OPTOMETRY | Facility: CLINIC | Age: 74
End: 2019-05-10
Payer: MEDICARE

## 2019-05-10 ENCOUNTER — TELEPHONE (OUTPATIENT)
Dept: OPHTHALMOLOGY | Facility: CLINIC | Age: 74
End: 2019-05-10

## 2019-05-10 DIAGNOSIS — H25.13 NUCLEAR SCLEROSIS, BILATERAL: ICD-10-CM

## 2019-05-10 DIAGNOSIS — H26.9 CORTICAL CATARACT OF BOTH EYES: ICD-10-CM

## 2019-05-10 DIAGNOSIS — H00.024 HORDEOLUM INTERNUM OF LEFT UPPER EYELID: Primary | ICD-10-CM

## 2019-05-10 PROCEDURE — 99999 PR PBB SHADOW E&M-EST. PATIENT-LVL III: ICD-10-PCS | Mod: PBBFAC,,, | Performed by: OPTOMETRIST

## 2019-05-10 PROCEDURE — 92012 PR EYE EXAM, EST PATIENT,INTERMED: ICD-10-PCS | Mod: S$GLB,,, | Performed by: OPTOMETRIST

## 2019-05-10 PROCEDURE — 99999 PR PBB SHADOW E&M-EST. PATIENT-LVL III: CPT | Mod: PBBFAC,,, | Performed by: OPTOMETRIST

## 2019-05-10 PROCEDURE — 92012 INTRM OPH EXAM EST PATIENT: CPT | Mod: S$GLB,,, | Performed by: OPTOMETRIST

## 2019-05-10 RX ORDER — CEPHALEXIN 250 MG/1
250 CAPSULE ORAL 4 TIMES DAILY
Qty: 40 CAPSULE | Refills: 0 | Status: SHIPPED | OUTPATIENT
Start: 2019-05-10 | End: 2019-05-20

## 2019-05-10 NOTE — PROGRESS NOTES
HPI     Patient in for progress check.    Pt come in today for an urgent visit of a swollen , redness , and pain   left eyelid    Being followed for (diagnosis):      Date last seen:  02/20/2019    Doctor last seen:      Prescribed eye medications(s) using:  none    OTC eye medication(s) using:  No     Signs/symptoms of condition resolved/better/stable/worse?:      Allergies/Medications reviewed today?  Yes           Last edited by Edwin Ch MA on 5/10/2019  2:22 PM. (History)            Assessment /Plan     For exam results, see Encounter Report.    1. Hordeolum internum of left upper eyelid  cephALEXin (KEFLEX) 250 MG capsule   2. Nuclear sclerosis, bilateral     3. Cortical cataract of both eyes                 Nuclear sclerosis/cortical cataract in both eyes, as noted previously.  Ms. Favret in today with signs/symptoms consistent with internal hordeolum and mild preseptal cellulitis of the left upper eyelid.  Prescribed Keflex (cephalexin) 250 mg capsules to be taken by mouth:  One capsule four times per day for ten days.  Regular and diligent application of warm compresses to the left upper lid several times per day.  Call/return in one week if still symptomatic, or prior if any apparent worsening of sign/symtpoms in the interim.

## 2019-05-10 NOTE — PATIENT INSTRUCTIONS
Nuclear sclerosis/cortical cataract in both eyes, as noted previously.  Ms. Favret in today with signs/symptoms consistent with internal hordeolum and mild preseptal cellulitis of the left upper eyelid.  Prescribed Keflex (cephalexin) 250 mg capsules to be taken by mouth:  One capsule four times per day for ten days.  Regular and diligent application of warm compresses to the left upper lid several times per day.  Call/return in one week if still symptomatic, or prior if any apparent worsening of sign/symtpoms in the interim.

## 2019-05-13 RX ORDER — RAMIPRIL 10 MG/1
10 CAPSULE ORAL DAILY
OUTPATIENT
Start: 2019-05-13

## 2019-05-13 RX ORDER — ALPRAZOLAM 0.25 MG/1
TABLET ORAL
Qty: 20 TABLET | Refills: 0 | OUTPATIENT
Start: 2019-05-13

## 2019-05-13 RX ORDER — ALPRAZOLAM 0.25 MG/1
TABLET ORAL
Qty: 20 TABLET | Refills: 0 | Status: SHIPPED | OUTPATIENT
Start: 2019-05-13 | End: 2019-07-30 | Stop reason: SDUPTHER

## 2019-05-13 NOTE — TELEPHONE ENCOUNTER
----- Message from Frances Buenrostro sent at 5/13/2019 10:03 AM CDT -----  Contact: self/[phone]  Patient called in regards needing to talk with Dr Orozco medical assistant about medication xanax.  Please call and advise. Thank you

## 2019-06-12 ENCOUNTER — OFFICE VISIT (OUTPATIENT)
Dept: INTERNAL MEDICINE | Facility: CLINIC | Age: 74
End: 2019-06-12
Payer: MEDICARE

## 2019-06-12 VITALS
DIASTOLIC BLOOD PRESSURE: 60 MMHG | SYSTOLIC BLOOD PRESSURE: 120 MMHG | OXYGEN SATURATION: 95 % | WEIGHT: 129.88 LBS | BODY MASS INDEX: 20.34 KG/M2 | HEART RATE: 63 BPM

## 2019-06-12 DIAGNOSIS — J06.9 ACUTE URI: Primary | ICD-10-CM

## 2019-06-12 PROCEDURE — 3078F PR MOST RECENT DIASTOLIC BLOOD PRESSURE < 80 MM HG: ICD-10-PCS | Mod: CPTII,S$GLB,, | Performed by: PHYSICIAN ASSISTANT

## 2019-06-12 PROCEDURE — 99213 OFFICE O/P EST LOW 20 MIN: CPT | Mod: S$GLB,,, | Performed by: PHYSICIAN ASSISTANT

## 2019-06-12 PROCEDURE — 3078F DIAST BP <80 MM HG: CPT | Mod: CPTII,S$GLB,, | Performed by: PHYSICIAN ASSISTANT

## 2019-06-12 PROCEDURE — 3074F SYST BP LT 130 MM HG: CPT | Mod: CPTII,S$GLB,, | Performed by: PHYSICIAN ASSISTANT

## 2019-06-12 PROCEDURE — 1101F PT FALLS ASSESS-DOCD LE1/YR: CPT | Mod: CPTII,S$GLB,, | Performed by: PHYSICIAN ASSISTANT

## 2019-06-12 PROCEDURE — 3074F PR MOST RECENT SYSTOLIC BLOOD PRESSURE < 130 MM HG: ICD-10-PCS | Mod: CPTII,S$GLB,, | Performed by: PHYSICIAN ASSISTANT

## 2019-06-12 PROCEDURE — 99999 PR PBB SHADOW E&M-EST. PATIENT-LVL IV: CPT | Mod: PBBFAC,,, | Performed by: PHYSICIAN ASSISTANT

## 2019-06-12 PROCEDURE — 99999 PR PBB SHADOW E&M-EST. PATIENT-LVL IV: ICD-10-PCS | Mod: PBBFAC,,, | Performed by: PHYSICIAN ASSISTANT

## 2019-06-12 PROCEDURE — 99213 PR OFFICE/OUTPT VISIT, EST, LEVL III, 20-29 MIN: ICD-10-PCS | Mod: S$GLB,,, | Performed by: PHYSICIAN ASSISTANT

## 2019-06-12 PROCEDURE — 1101F PR PT FALLS ASSESS DOC 0-1 FALLS W/OUT INJ PAST YR: ICD-10-PCS | Mod: CPTII,S$GLB,, | Performed by: PHYSICIAN ASSISTANT

## 2019-06-12 RX ORDER — FLUTICASONE PROPIONATE 50 MCG
1 SPRAY, SUSPENSION (ML) NASAL DAILY
Qty: 16 G | Refills: 0 | Status: SHIPPED | OUTPATIENT
Start: 2019-06-12 | End: 2019-07-05 | Stop reason: SDUPTHER

## 2019-06-12 NOTE — PATIENT INSTRUCTIONS
CONTINUE ZYRTEC.     CONTINUE ROBITUSSIN    I HAVE SENT FLONASE TO YOUR PHARMACY    LET ME KNOW IF THINGS WORSEN OR DO NOT IMPROVE.

## 2019-06-12 NOTE — PROGRESS NOTES
Subjective:       Patient ID: Jacqueline O Favret is a 73 y.o. female.    Chief Complaint: Cough and Nasal Congestion    HPI     Established pt of Kristina Orozco MD (new to me)    C/o cough and congestion. Onset about 4 days ago. Recently returned home from a cruise. Taking OTC Robitussin and Zyrtec which have helped symptoms. She denies fevers, n/v/d, cp, sob or sore throat.     Review of patient's allergies indicates:  No Known Allergies    Past Medical History:   Diagnosis Date    Abnormal liver enzymes 4/6/2015    Acute on chronic kidney disease, stage 3 4/18/2013    Alcohol-induced acute pancreatitis 8/16/2015    Anemia     Basal cell carcinoma 1985    nose    Bunion, right foot 12/14/2012    Compression fracture 11/14/2013    MI (myocardial infarction) 1991    PAF (paroxysmal atrial fibrillation) 9/8/2015    During acute illness     SCC (squamous cell carcinoma) 05/11/2016    in situ left lower lip, nasal bridge     Social History     Tobacco Use    Smoking status: Former Smoker     Packs/day: 1.00     Years: 15.00     Pack years: 15.00     Types: Cigarettes     Last attempt to quit: 4/6/2004     Years since quitting: 15.1    Smokeless tobacco: Never Used   Substance Use Topics    Alcohol use: Yes     Alcohol/week: 3.0 - 6.0 oz     Types: 5 - 10 Standard drinks or equivalent per week     Comment: 1 glass scotch a day, last use last night    Drug use: No               Review of Systems   Constitutional: Negative for chills, fever and unexpected weight change.   HENT: Positive for congestion, postnasal drip and rhinorrhea.    Respiratory: Positive for cough. Negative for shortness of breath and wheezing.    Cardiovascular: Negative for chest pain.   Gastrointestinal: Negative for nausea and vomiting.   Skin: Negative for rash.   Neurological: Negative for weakness and headaches.       Objective: /60   Pulse 63   Wt 58.9 kg (129 lb 13.6 oz)   LMP  (LMP Unknown)   SpO2 95%   BMI 20.34  kg/m²         Physical Exam   Constitutional: She appears well-developed and well-nourished. No distress.   HENT:   Head: Normocephalic and atraumatic.   Nose: Mucosal edema and rhinorrhea present. Right sinus exhibits no maxillary sinus tenderness. Left sinus exhibits no maxillary sinus tenderness.   Mouth/Throat: Oropharynx is clear and moist and mucous membranes are normal. No posterior oropharyngeal erythema.   Cardiovascular: Normal rate and regular rhythm. Exam reveals no friction rub.   No murmur heard.  Pulmonary/Chest: Effort normal and breath sounds normal. She has no wheezes. She has no rales.   Abdominal: Soft. Bowel sounds are normal. There is no tenderness.   Lymphadenopathy:     She has no cervical adenopathy.   Neurological: She is alert.   Skin: Skin is warm and dry. No rash noted.   Vitals reviewed.      Assessment:       1. Acute URI        Plan:         Marli was seen today for cough and nasal congestion.    Diagnoses and all orders for this visit:    Acute URI       - Continue OTC Robitussin and Zrytec  - Start fluticasone propionate (FLONASE) 50 mcg/actuation nasal spray; 1 spray (50 mcg total) by Each Nare route once daily.  - Stay well hydrated.   -RTC/Call/[facility] msg if symptoms worsen or fail to improve    Imelda Rivas PA-C

## 2019-06-14 ENCOUNTER — PATIENT MESSAGE (OUTPATIENT)
Dept: INTERNAL MEDICINE | Facility: CLINIC | Age: 74
End: 2019-06-14

## 2019-06-23 RX ORDER — POTASSIUM CHLORIDE 20 MEQ/15ML
10 SOLUTION ORAL DAILY
Qty: 473 ML | Refills: 0 | Status: SHIPPED | OUTPATIENT
Start: 2019-06-23 | End: 2019-07-30 | Stop reason: SDUPTHER

## 2019-07-04 ENCOUNTER — PATIENT MESSAGE (OUTPATIENT)
Dept: OTOLARYNGOLOGY | Facility: CLINIC | Age: 74
End: 2019-07-04

## 2019-07-05 DIAGNOSIS — J06.9 ACUTE URI: ICD-10-CM

## 2019-07-05 RX ORDER — FLUTICASONE PROPIONATE 50 MCG
1 SPRAY, SUSPENSION (ML) NASAL DAILY
Qty: 16 ML | Refills: 0 | Status: SHIPPED | OUTPATIENT
Start: 2019-07-05 | End: 2019-07-19

## 2019-07-10 RX ORDER — ROSUVASTATIN CALCIUM 20 MG/1
TABLET, COATED ORAL
Qty: 90 TABLET | Refills: 1 | Status: SHIPPED | OUTPATIENT
Start: 2019-07-10 | End: 2020-01-09

## 2019-07-15 ENCOUNTER — OFFICE VISIT (OUTPATIENT)
Dept: OTOLARYNGOLOGY | Facility: CLINIC | Age: 74
End: 2019-07-15
Payer: MEDICARE

## 2019-07-15 VITALS
DIASTOLIC BLOOD PRESSURE: 85 MMHG | HEART RATE: 78 BPM | WEIGHT: 126 LBS | SYSTOLIC BLOOD PRESSURE: 178 MMHG | BODY MASS INDEX: 19.73 KG/M2

## 2019-07-15 DIAGNOSIS — K13.79 LESION OF PALATE: Primary | ICD-10-CM

## 2019-07-15 PROCEDURE — 99213 OFFICE O/P EST LOW 20 MIN: CPT | Mod: S$GLB,,, | Performed by: OTOLARYNGOLOGY

## 2019-07-15 PROCEDURE — 1101F PT FALLS ASSESS-DOCD LE1/YR: CPT | Mod: CPTII,S$GLB,, | Performed by: OTOLARYNGOLOGY

## 2019-07-15 PROCEDURE — 1101F PR PT FALLS ASSESS DOC 0-1 FALLS W/OUT INJ PAST YR: ICD-10-PCS | Mod: CPTII,S$GLB,, | Performed by: OTOLARYNGOLOGY

## 2019-07-15 PROCEDURE — 99999 PR PBB SHADOW E&M-EST. PATIENT-LVL III: ICD-10-PCS | Mod: PBBFAC,,, | Performed by: OTOLARYNGOLOGY

## 2019-07-15 PROCEDURE — 3077F PR MOST RECENT SYSTOLIC BLOOD PRESSURE >= 140 MM HG: ICD-10-PCS | Mod: CPTII,S$GLB,, | Performed by: OTOLARYNGOLOGY

## 2019-07-15 PROCEDURE — 3079F DIAST BP 80-89 MM HG: CPT | Mod: CPTII,S$GLB,, | Performed by: OTOLARYNGOLOGY

## 2019-07-15 PROCEDURE — 3079F PR MOST RECENT DIASTOLIC BLOOD PRESSURE 80-89 MM HG: ICD-10-PCS | Mod: CPTII,S$GLB,, | Performed by: OTOLARYNGOLOGY

## 2019-07-15 PROCEDURE — 99213 PR OFFICE/OUTPT VISIT, EST, LEVL III, 20-29 MIN: ICD-10-PCS | Mod: S$GLB,,, | Performed by: OTOLARYNGOLOGY

## 2019-07-15 PROCEDURE — 3077F SYST BP >= 140 MM HG: CPT | Mod: CPTII,S$GLB,, | Performed by: OTOLARYNGOLOGY

## 2019-07-15 PROCEDURE — 99999 PR PBB SHADOW E&M-EST. PATIENT-LVL III: CPT | Mod: PBBFAC,,, | Performed by: OTOLARYNGOLOGY

## 2019-07-15 NOTE — ASSESSMENT & PLAN NOTE
Leukoplakic lesion of the hard/soft palate junction.  I offered her to options:  Biopsy versus observation.  She wishes to proceed with observation.  I will see her again in 3 months.  She will contact me with concerns/problems in the meantime.

## 2019-07-15 NOTE — PROGRESS NOTES
Chief Complaint   Patient presents with    spot on pallet needs checked       HPI   73 y.o. female presents for evaluation of a lesion of the left hard/soft palate junction.  This lesion was noted on routine dental examination.  She has no complaints.  I saw her for this lesion in 2015.    Review of Systems   Constitutional: Negative for fatigue and unexpected weight change.   HENT: Per HPI.  Eyes: Negative for visual disturbance.   Respiratory: Negative for shortness of breath, hemoptysis   Cardiovascular: Negative for chest pain and palpitations.   Musculoskeletal: Negative for decreased ROM, back pain.   Skin: Negative for rash, sunburn, itching.   Neurological: Negative for dizziness and seizures.   Hematological: Negative for adenopathy. Does not bruise/bleed easily.   Endocrine: Negative for rapid weight loss/weight gain, heat/cold intolerance.     Past Medical History   Patient Active Problem List   Diagnosis    Hypertension    Hyperlipidemia    Coronary artery disease    Adjustment disorder with depressed mood    Status post non-ST elevation myocardial infarction (NSTEMI)    Colon adenoma    Personal history of skin cancer    Weakness of left lower extremity    Food impaction of esophagus    Dysphagia    Nasal sore    Acute renal failure superimposed on stage 3 chronic kidney disease    Hypokalemia    Elevated transaminase level           Past Surgical History   Past Surgical History:   Procedure Laterality Date    blephroplasty      BREAST BIOPSY Right     excisional 1985    BUNIONECTOMY  Jan 2013    right foot    CARDIAC CATHETERIZATION      COLONOSCOPY N/A 12/21/2016    [facility]oval [facility] (4TH [facility]LR)    COLONOSCOPY N/A 9/20/2016    Performed by Rachelle Guerra MD at [facility] END[facility] (4TH FLR)    EGD (ESOPHAGOGASTRODUODENOSCOPY) N/A 7/19/2018    Performed by Jefferson Mina MD at [facility] (4TH FLR)    FOOT SURGERY      right foot    HYSTERECTOMY  1980s     bleeding    OPEN REDUCTION INTERNAL FIXATION-ORBITAL FRACTURE Right 1/23/2015    [facility]    OPEN REDUCTION INTERNAL FIXATION-ORBITAL FRACTURE Right 10/22/2014    [facility] at [facility] 2ND FLR    orbital fx      REPAIR-ECTROPION Bilateral 3/29/2016    [facility]formed [facility]ST FLR    REPAIR-PTOSIS Bilateral 3/29/2016    [facility]ST FLR    TONSILLECTOMY      VAGINA SURGERY           Family History   Family History   Problem Relation Age of Onset    Diabetes Mother     Heart disease Father 57        sudden death    Celiac disease Neg Hx     Colon cancer Neg Hx     Crohn's disease Neg Hx     Esophageal cancer Neg Hx     Inflammatory bowel disease Neg Hx     Irritable bowel syndrome Neg Hx     Liver cancer Neg Hx     Rectal cancer Neg Hx     Stomach cancer Neg Hx     Ulcerative colitis Neg Hx     Melanoma Neg Hx            Social History   .  Social History     Socioeconomic History    Marital status:      Spouse name: Not on file    Number of children: Not on file    Years of education: Not on file    Highest education level: Not on file   Occupational History    Not on file   Social Needs    Financial resource strain: Not on file    Food insecurity:     Worry: Not on file     Inability: Not on file    Transportation needs:     Medical: Not on file     Non-medical: Not on file   Tobacco Use    Smoking status: Former Smoker     Packs/day: 1.00     Years: 15.00     Pack years: 15.00     Types: Cigarettes     Last attempt to quit: 4/6/2004     Years since quitting: 15.2    Smokeless tobacco: Never Used   Substance and Sexual Activity    Alcohol use: Yes     Alcohol/week: 3.0 - 6.0 oz     Types: 5 - 10 Standard drinks or equivalent per week     Comment: 1 glass scotch a day, last use last night    Drug use: No    Sexual activity: Never     Partners: Male      Birth control/protection: None   Lifestyle    Physical activity:     Days per week: Not on file     Minutes per session: Not on file    Stress: Not on file   Relationships    Social connections:     Talks on phone: Not on file     Gets together: Not on file     Attends Islam service: Not on file     Active member of club or organization: Not on file     Attends meetings of clubs or organizations: Not on file     Relationship status: Not on file   Other Topics Concern    Are you pregnant or think you may be? Not Asked    Breast-feeding Not Asked   Social History Narrative    Not on file         Allergies   Review of patient's allergies indicates:  No Known Allergies        Physical Exam     Vitals:    07/15/19 1512   BP: (!) 178/85   Pulse: 78         Body mass index is 19.73 kg/m².      General: AOx3, NAD   Respiratory:  Symmetric chest rise, normal effort  Nose: No gross nasal septal deviation. Inferior Turbinates WNL bilaterally. No septal perforation. No masses/lesions.   Oral Cavity:  Oral Tongue mobile, no lesions noted.  No buccal or FOM lesions.  See photo.  Oropharynx:  No masses/lesions of the posterior pharyngeal wall. Tonsillar fossa without lesions. Soft palate without masses. Midline uvula.   Neck: No scars.  No cervical lymphadenopathy, thyromegaly or thyroid nodules.  Normal range of motion.    Face: House Brackmann I bilaterally.             Assessment/Plan  Problem List Items Addressed This Visit        ENT    Lesion of palate - Primary     Leukoplakic lesion of the hard/soft palate junction.  I offered her to options:  Biopsy versus observation.  She wishes to proceed with observation.  I will see her again in 3 months.  She will contact me with concerns/problems in the meantime.

## 2019-07-18 ENCOUNTER — PATIENT MESSAGE (OUTPATIENT)
Dept: OTOLARYNGOLOGY | Facility: CLINIC | Age: 74
End: 2019-07-18

## 2019-07-18 DIAGNOSIS — J06.9 ACUTE URI: ICD-10-CM

## 2019-07-19 ENCOUNTER — PATIENT MESSAGE (OUTPATIENT)
Dept: OTOLARYNGOLOGY | Facility: CLINIC | Age: 74
End: 2019-07-19

## 2019-07-19 RX ORDER — FLUTICASONE PROPIONATE 50 MCG
1 SPRAY, SUSPENSION (ML) NASAL DAILY
Qty: 16 ML | Refills: 12 | Status: SHIPPED | OUTPATIENT
Start: 2019-07-19

## 2019-07-21 RX ORDER — AMLODIPINE BESYLATE 2.5 MG/1
2.5 TABLET ORAL DAILY
Qty: 90 TABLET | Refills: 3 | Status: SHIPPED | OUTPATIENT
Start: 2019-07-21 | End: 2019-11-05

## 2019-07-30 ENCOUNTER — LAB VISIT (OUTPATIENT)
Dept: LAB | Facility: HOSPITAL | Age: 74
End: 2019-07-30
Attending: INTERNAL MEDICINE
Payer: MEDICARE

## 2019-07-30 ENCOUNTER — OFFICE VISIT (OUTPATIENT)
Dept: INTERNAL MEDICINE | Facility: CLINIC | Age: 74
End: 2019-07-30
Payer: MEDICARE

## 2019-07-30 ENCOUNTER — TELEPHONE (OUTPATIENT)
Dept: INTERNAL MEDICINE | Facility: CLINIC | Age: 74
End: 2019-07-30

## 2019-07-30 VITALS
HEIGHT: 67 IN | HEART RATE: 67 BPM | BODY MASS INDEX: 20.1 KG/M2 | WEIGHT: 128.06 LBS | SYSTOLIC BLOOD PRESSURE: 128 MMHG | DIASTOLIC BLOOD PRESSURE: 67 MMHG | OXYGEN SATURATION: 95 % | TEMPERATURE: 97 F

## 2019-07-30 DIAGNOSIS — R73.9 HYPERGLYCEMIA: ICD-10-CM

## 2019-07-30 DIAGNOSIS — I10 ESSENTIAL HYPERTENSION: Primary | ICD-10-CM

## 2019-07-30 DIAGNOSIS — E87.6 HYPOKALEMIA: Primary | ICD-10-CM

## 2019-07-30 DIAGNOSIS — E78.5 HYPERLIPIDEMIA, UNSPECIFIED HYPERLIPIDEMIA TYPE: ICD-10-CM

## 2019-07-30 DIAGNOSIS — E55.9 MILD VITAMIN D DEFICIENCY: ICD-10-CM

## 2019-07-30 DIAGNOSIS — I10 ESSENTIAL HYPERTENSION: ICD-10-CM

## 2019-07-30 DIAGNOSIS — Z85.828 PERSONAL HISTORY OF SKIN CANCER: ICD-10-CM

## 2019-07-30 DIAGNOSIS — K63.5 POLYP OF COLON, UNSPECIFIED PART OF COLON, UNSPECIFIED TYPE: ICD-10-CM

## 2019-07-30 LAB
25(OH)D3+25(OH)D2 SERPL-MCNC: 35 NG/ML (ref 30–96)
ALBUMIN SERPL BCP-MCNC: 4.1 G/DL (ref 3.5–5.2)
ALP SERPL-CCNC: 71 U/L (ref 55–135)
ALT SERPL W/O P-5'-P-CCNC: 19 U/L (ref 10–44)
ANION GAP SERPL CALC-SCNC: 11 MMOL/L (ref 8–16)
AST SERPL-CCNC: 33 U/L (ref 10–40)
BASOPHILS # BLD AUTO: 0.04 K/UL (ref 0–0.2)
BASOPHILS NFR BLD: 0.6 % (ref 0–1.9)
BILIRUB SERPL-MCNC: 0.4 MG/DL (ref 0.1–1)
BUN SERPL-MCNC: 8 MG/DL (ref 8–23)
CALCIUM SERPL-MCNC: 10.4 MG/DL (ref 8.7–10.5)
CHLORIDE SERPL-SCNC: 95 MMOL/L (ref 95–110)
CHOLEST SERPL-MCNC: 188 MG/DL (ref 120–199)
CHOLEST/HDLC SERPL: 2 {RATIO} (ref 2–5)
CO2 SERPL-SCNC: 32 MMOL/L (ref 23–29)
CREAT SERPL-MCNC: 0.7 MG/DL (ref 0.5–1.4)
DIFFERENTIAL METHOD: ABNORMAL
EOSINOPHIL # BLD AUTO: 0.1 K/UL (ref 0–0.5)
EOSINOPHIL NFR BLD: 1.9 % (ref 0–8)
ERYTHROCYTE [DISTWIDTH] IN BLOOD BY AUTOMATED COUNT: 11.8 % (ref 11.5–14.5)
EST. GFR  (AFRICAN AMERICAN): >60 ML/MIN/1.73 M^2
EST. GFR  (NON AFRICAN AMERICAN): >60 ML/MIN/1.73 M^2
ESTIMATED AVG GLUCOSE: 97 MG/DL (ref 68–131)
GLUCOSE SERPL-MCNC: 81 MG/DL (ref 70–110)
HBA1C MFR BLD HPLC: 5 % (ref 4–5.6)
HCT VFR BLD AUTO: 47.5 % (ref 37–48.5)
HDLC SERPL-MCNC: 93 MG/DL (ref 40–75)
HDLC SERPL: 49.5 % (ref 20–50)
HGB BLD-MCNC: 15.5 G/DL (ref 12–16)
IMM GRANULOCYTES # BLD AUTO: 0.02 K/UL (ref 0–0.04)
IMM GRANULOCYTES NFR BLD AUTO: 0.3 % (ref 0–0.5)
LDLC SERPL CALC-MCNC: 73.6 MG/DL (ref 63–159)
LYMPHOCYTES # BLD AUTO: 1.6 K/UL (ref 1–4.8)
LYMPHOCYTES NFR BLD: 25.1 % (ref 18–48)
MCH RBC QN AUTO: 32.8 PG (ref 27–31)
MCHC RBC AUTO-ENTMCNC: 32.6 G/DL (ref 32–36)
MCV RBC AUTO: 100 FL (ref 82–98)
MONOCYTES # BLD AUTO: 0.8 K/UL (ref 0.3–1)
MONOCYTES NFR BLD: 11.8 % (ref 4–15)
NEUTROPHILS # BLD AUTO: 3.9 K/UL (ref 1.8–7.7)
NEUTROPHILS NFR BLD: 60.3 % (ref 38–73)
NONHDLC SERPL-MCNC: 95 MG/DL
NRBC BLD-RTO: 0 /100 WBC
PLATELET # BLD AUTO: 190 K/UL (ref 150–350)
PMV BLD AUTO: 10.3 FL (ref 9.2–12.9)
POTASSIUM SERPL-SCNC: 3.1 MMOL/L (ref 3.5–5.1)
PROT SERPL-MCNC: 7.4 G/DL (ref 6–8.4)
RBC # BLD AUTO: 4.73 M/UL (ref 4–5.4)
SODIUM SERPL-SCNC: 138 MMOL/L (ref 136–145)
TRIGL SERPL-MCNC: 107 MG/DL (ref 30–150)
TSH SERPL DL<=0.005 MIU/L-ACNC: 1.61 UIU/ML (ref 0.4–4)
WBC # BLD AUTO: 6.42 K/UL (ref 3.9–12.7)

## 2019-07-30 PROCEDURE — 85025 COMPLETE CBC W/AUTO DIFF WBC: CPT

## 2019-07-30 PROCEDURE — 80061 LIPID PANEL: CPT

## 2019-07-30 PROCEDURE — 84443 ASSAY THYROID STIM HORMONE: CPT

## 2019-07-30 PROCEDURE — 1101F PT FALLS ASSESS-DOCD LE1/YR: CPT | Mod: CPTII,S$GLB,, | Performed by: INTERNAL MEDICINE

## 2019-07-30 PROCEDURE — 80053 COMPREHEN METABOLIC PANEL: CPT

## 2019-07-30 PROCEDURE — 36415 COLL VENOUS BLD VENIPUNCTURE: CPT | Mod: PO

## 2019-07-30 PROCEDURE — 3078F DIAST BP <80 MM HG: CPT | Mod: CPTII,S$GLB,, | Performed by: INTERNAL MEDICINE

## 2019-07-30 PROCEDURE — 99214 OFFICE O/P EST MOD 30 MIN: CPT | Mod: S$GLB,,, | Performed by: INTERNAL MEDICINE

## 2019-07-30 PROCEDURE — 1101F PR PT FALLS ASSESS DOC 0-1 FALLS W/OUT INJ PAST YR: ICD-10-PCS | Mod: CPTII,S$GLB,, | Performed by: INTERNAL MEDICINE

## 2019-07-30 PROCEDURE — 99999 PR PBB SHADOW E&M-EST. PATIENT-LVL V: CPT | Mod: PBBFAC,,, | Performed by: INTERNAL MEDICINE

## 2019-07-30 PROCEDURE — 83036 HEMOGLOBIN GLYCOSYLATED A1C: CPT

## 2019-07-30 PROCEDURE — 99214 PR OFFICE/OUTPT VISIT, EST, LEVL IV, 30-39 MIN: ICD-10-PCS | Mod: S$GLB,,, | Performed by: INTERNAL MEDICINE

## 2019-07-30 PROCEDURE — 99499 UNLISTED E&M SERVICE: CPT | Mod: S$GLB,,, | Performed by: INTERNAL MEDICINE

## 2019-07-30 PROCEDURE — 99999 PR PBB SHADOW E&M-EST. PATIENT-LVL V: ICD-10-PCS | Mod: PBBFAC,,, | Performed by: INTERNAL MEDICINE

## 2019-07-30 PROCEDURE — 99499 RISK ADDL DX/OHS AUDIT: ICD-10-PCS | Mod: S$GLB,,, | Performed by: INTERNAL MEDICINE

## 2019-07-30 PROCEDURE — 82306 VITAMIN D 25 HYDROXY: CPT

## 2019-07-30 PROCEDURE — 3074F PR MOST RECENT SYSTOLIC BLOOD PRESSURE < 130 MM HG: ICD-10-PCS | Mod: CPTII,S$GLB,, | Performed by: INTERNAL MEDICINE

## 2019-07-30 PROCEDURE — 3074F SYST BP LT 130 MM HG: CPT | Mod: CPTII,S$GLB,, | Performed by: INTERNAL MEDICINE

## 2019-07-30 PROCEDURE — 3078F PR MOST RECENT DIASTOLIC BLOOD PRESSURE < 80 MM HG: ICD-10-PCS | Mod: CPTII,S$GLB,, | Performed by: INTERNAL MEDICINE

## 2019-07-30 RX ORDER — ALPRAZOLAM 0.25 MG/1
TABLET ORAL
Qty: 20 TABLET | Refills: 0 | Status: SHIPPED | OUTPATIENT
Start: 2019-07-30 | End: 2019-11-05 | Stop reason: SDUPTHER

## 2019-07-30 RX ORDER — POTASSIUM CHLORIDE 20 MEQ/15ML
10 SOLUTION ORAL DAILY
Qty: 473 ML | Refills: 3 | Status: SHIPPED | OUTPATIENT
Start: 2019-07-30 | End: 2020-02-06

## 2019-07-30 NOTE — TELEPHONE ENCOUNTER
I spoke with patient, she will increase potassium to 15ml twice daily for 3 days and will do lab 08/02/19 at [place]

## 2019-07-30 NOTE — TELEPHONE ENCOUNTER
Please inform low potassium increase it to 15ml twice daily for three days - please schedule  lab 8/2/19 thanks.

## 2019-07-31 NOTE — PROGRESS NOTES
Subjective:       Patient ID: Jacqueline O Favret is a 73 y.o. female.    Chief Complaint: Follow-up    HPIPt is feeling well - went on a trip to [place] loved it.  No CP or SOB.  No GI issues.  Review of Systems   Constitutional: Negative for activity change.   Eyes: Negative for discharge.   Respiratory: Negative for shortness of breath (PND or orthopnea) and wheezing.    Cardiovascular: Negative for chest pain and palpitations.   Gastrointestinal: Negative for abdominal pain, constipation, diarrhea, nausea and vomiting.   Genitourinary: Negative for difficulty urinating, dysuria and hematuria.   Neurological: Negative for seizures, syncope and headaches.   Psychiatric/Behavioral: Negative for dysphoric mood.       Objective:      Physical Exam   Constitutional: She is oriented to person, place, and time. She appears well-developed and well-nourished. No distress.   HENT:   Head: Normocephalic.   Mouth/Throat: Oropharynx is clear and moist.   Eyes: Pupils are equal, round, and reactive to light. EOM are normal.   Neck: Neck supple. No JVD present. No thyromegaly present.   Cardiovascular: Normal rate, regular rhythm, normal heart sounds and intact distal pulses. Exam reveals no gallop and no friction rub.   No murmur heard.  Pulmonary/Chest: Effort normal and breath sounds normal. She has no wheezes. She has no rales.   Abdominal: Soft. Bowel sounds are normal. She exhibits no distension and no mass. There is no tenderness. There is no rebound and no guarding.   Musculoskeletal: She exhibits no edema.   Lymphadenopathy:     She has no cervical adenopathy.   Neurological: She is alert and oriented to person, place, and time. She has normal reflexes.   Skin: Skin is warm and dry.   Psychiatric: She has a normal mood and affect. Her behavior is normal. Judgment and thought content normal.       Assessment:       1. Essential hypertension    2. Hyperlipidemia, unspecified hyperlipidemia type    3. Hyperglycemia    4.  Mild vitamin D deficiency    5. Polyp of colon, unspecified part of colon, unspecified type    6. Personal history of skin cancer        Plan:   Essential hypertension  -     CBC auto differential; Future; Expected date: 07/30/2019  -     Comprehensive metabolic panel; Future; Expected date: 07/30/2019  -     TSH; Future; Expected date: 07/30/2019  Controlled - continue current meds    Hyperlipidemia, unspecified hyperlipidemia type  -     Lipid panel; Future; Expected date: 07/30/2019    Hyperglycemia  -     Hemoglobin A1c; Future; Expected date: 07/30/2019    Mild vitamin D deficiency  -     Vitamin D; Future; Expected date: 07/30/2019    Polyp of colon, unspecified part of colon, unspecified type  -     Case request GI: COLONOSCOPY    Personal history of skin cancer  -     Ambulatory consult to Dermatology    Other orders  -     ALPRAZolam (XANAX) 0.25 MG tablet; TAKE 1/2 TO 1 TABLET BY MOUTH BEFORE FLIGHT  Dispense: 20 tablet; Refill: 0  -     potassium chloride 10% (KAYCIEL) 20 mEq/15 mL oral solution; Take 7.5 mLs (10 mEq total) by mouth once daily.  Dispense: 473 mL; Refill: 3

## 2019-08-02 ENCOUNTER — LAB VISIT (OUTPATIENT)
Dept: LAB | Facility: HOSPITAL | Age: 74
End: 2019-08-02
Attending: INTERNAL MEDICINE
Payer: MEDICARE

## 2019-08-02 DIAGNOSIS — E87.6 HYPOKALEMIA: ICD-10-CM

## 2019-08-02 LAB
ANION GAP SERPL CALC-SCNC: 10 MMOL/L (ref 8–16)
BUN SERPL-MCNC: 9 MG/DL (ref 8–23)
CALCIUM SERPL-MCNC: 10.8 MG/DL (ref 8.7–10.5)
CHLORIDE SERPL-SCNC: 98 MMOL/L (ref 95–110)
CO2 SERPL-SCNC: 31 MMOL/L (ref 23–29)
CREAT SERPL-MCNC: 0.9 MG/DL (ref 0.5–1.4)
EST. GFR  (AFRICAN AMERICAN): >60 ML/MIN/1.73 M^2
EST. GFR  (NON AFRICAN AMERICAN): >60 ML/MIN/1.73 M^2
GLUCOSE SERPL-MCNC: 133 MG/DL (ref 70–110)
POTASSIUM SERPL-SCNC: 3.5 MMOL/L (ref 3.5–5.1)
SODIUM SERPL-SCNC: 139 MMOL/L (ref 136–145)

## 2019-08-02 PROCEDURE — 36415 COLL VENOUS BLD VENIPUNCTURE: CPT | Mod: PO

## 2019-08-02 PROCEDURE — 80048 BASIC METABOLIC PNL TOTAL CA: CPT

## 2019-09-18 ENCOUNTER — TELEPHONE (OUTPATIENT)
Dept: INTERNAL MEDICINE | Facility: CLINIC | Age: 74
End: 2019-09-18

## 2019-09-18 NOTE — TELEPHONE ENCOUNTER
----- Message from Brynn Giraldo sent at 9/18/2019 11:12 AM CDT -----  Contact: pt  Pt would like to be called back regarding  Her appt    Pt can be reached at [phone]

## 2019-09-25 ENCOUNTER — PATIENT OUTREACH (OUTPATIENT)
Dept: ADMINISTRATIVE | Facility: OTHER | Age: 74
End: 2019-09-25

## 2019-09-26 ENCOUNTER — PROCEDURE VISIT (OUTPATIENT)
Dept: DERMATOLOGY | Facility: CLINIC | Age: 74
End: 2019-09-26
Payer: MEDICARE

## 2019-09-26 DIAGNOSIS — L81.4 LENTIGO: ICD-10-CM

## 2019-09-26 DIAGNOSIS — L90.5 SCAR: ICD-10-CM

## 2019-09-26 DIAGNOSIS — Z85.828 PERSONAL HISTORY OF SKIN CANCER: ICD-10-CM

## 2019-09-26 DIAGNOSIS — D18.00 ANGIOMA: ICD-10-CM

## 2019-09-26 DIAGNOSIS — L82.1 SK (SEBORRHEIC KERATOSIS): ICD-10-CM

## 2019-09-26 DIAGNOSIS — D22.9 NEVUS: ICD-10-CM

## 2019-09-26 DIAGNOSIS — D48.5 NEOPLASM OF UNCERTAIN BEHAVIOR OF SKIN: Primary | ICD-10-CM

## 2019-09-26 PROCEDURE — 11102 PR TANGENTIAL BIOPSY, SKIN, SINGLE LESION: ICD-10-PCS | Mod: S$GLB,,, | Performed by: DERMATOLOGY

## 2019-09-26 PROCEDURE — 88304 TISSUE SPECIMEN TO PATHOLOGY, DERMATOLOGY: ICD-10-PCS | Mod: 26,,, | Performed by: PATHOLOGY

## 2019-09-26 PROCEDURE — 88304 TISSUE EXAM BY PATHOLOGIST: CPT | Performed by: PATHOLOGY

## 2019-09-26 PROCEDURE — 11102 TANGNTL BX SKIN SINGLE LES: CPT | Mod: S$GLB,,, | Performed by: DERMATOLOGY

## 2019-09-26 PROCEDURE — 99213 OFFICE O/P EST LOW 20 MIN: CPT | Mod: 25,S$GLB,, | Performed by: DERMATOLOGY

## 2019-09-26 PROCEDURE — 99213 PR OFFICE/OUTPT VISIT, EST, LEVL III, 20-29 MIN: ICD-10-PCS | Mod: 25,S$GLB,, | Performed by: DERMATOLOGY

## 2019-09-26 NOTE — PROGRESS NOTES
Subjective:       Patient ID:  Jacqueline O Favret is a 74 y.o. female who presents for   Chief Complaint   Patient presents with    Skin Check     Pt here today for a UBSE. Pt c/o flesh colored bump on left elbow x 1-2 years. Hard and raised. Pt would like lesion removed. No bleeding, pain or prev tx.  This is a high risk patient here to check for the development of new lesions.        Review of Systems   Skin: Positive for daily sunscreen use and activity-related sunscreen use. Negative for tendency to form keloidal scars and recent sunburn.   Hematologic/Lymphatic: Bruises/bleeds easily.        Objective:    Physical Exam   Constitutional: She appears well-developed and well-nourished. No distress.   Neurological: She is alert and oriented to person, place, and time. She is not disoriented.   Psychiatric: She has a normal mood and affect.   Skin:   Areas Examined (abnormalities noted in diagram):   Scalp / Hair Palpated and Inspected  Head / Face Inspection Performed  Neck Inspection Performed  Chest / Axilla Inspection Performed  Abdomen Inspection Performed  Back Inspection Performed  RUE Inspected  LUE Inspection Performed  RLE Inspected  LLE Inspection Performed  Nails and Digits Inspection Performed                   Diagram Legend     Erythematous scaling macule/papule c/w actinic keratosis       Vascular papule c/w angioma      Pigmented verrucoid papule/plaque c/w seborrheic keratosis      Yellow umbilicated papule c/w sebaceous hyperplasia      Irregularly shaped tan macule c/w lentigo     1-2 mm smooth white papules consistent with Milia      Movable subcutaneous cyst with punctum c/w epidermal inclusion cyst      Subcutaneous movable cyst c/w pilar cyst      Firm pink to brown papule c/w dermatofibroma      Pedunculated fleshy papule(s) c/w skin tag(s)      Evenly pigmented macule c/w junctional nevus     Mildly variegated pigmented, slightly irregular-bordered macule c/w mildly atypical nevus       Flesh colored to evenly pigmented papule c/w intradermal nevus       Pink pearly papule/plaque c/w basal cell carcinoma      Erythematous hyperkeratotic cursted plaque c/w SCC      Surgical scar with no sign of skin cancer recurrence      Open and closed comedones      Inflammatory papules and pustules      Verrucoid papule consistent consistent with wart     Erythematous eczematous patches and plaques     Dystrophic onycholytic nail with subungual debris c/w onychomycosis     Umbilicated papule    Erythematous-base heme-crusted tan verrucoid plaque consistent with inflamed seborrheic keratosis     Erythematous Silvery Scaling Plaque c/w Psoriasis     See annotation      Assessment / Plan:      Pathology Orders:     Normal Orders This Visit    Tissue Specimen To Pathology, Dermatology     Questions:    Directional Terms:  Other(comment)    Clinical Information:  r/o cyst    Specific Site:  left elbow        Neoplasm of uncertain behavior of skin  Shave biopsy procedure note:    Shave biopsy performed after verbal consent including risk of infection, scar, recurrence, need for additional treatment of site. Area prepped with alcohol, anesthetized with approximately 1.0cc of 1% lidocaine with epinephrine. Lesional tissue shaved with razor blade. Hemostasis achieved with application of aluminum chloride followed by hyfrecation. No complications. Dressing applied. Wound care explained.      -     Tissue Specimen To Pathology, Dermatology    Lentigo  This is a benign hyperpigmented sun induced lesion. Daily sun protection will reduce the number of new lesions. Treatment of these benign lesions are considered cosmetic.  The nature of sun-induced photo-aging and skin cancers is discussed.  Sun avoidance, protective clothing, and the use of 30-SPF sunscreens is advised. Observe closely for skin damage/changes, and call if such occurs.    Angioma  These are benign vascular lesions that are inherited.  Treatment is not  necessary.    Nevus  Discussed ABCDE's of nevi.  Monitor for new mole or moles that are becoming bigger, darker, irritated, or developing irregular borders. Brochure provided.    SK (seborrheic keratosis)  These are benign inherited growths without a malignant potential. Reassurance given to patient. No treatment is necessary.     Personal history of skin cancer  Scar  Area(s) of previous NMSC evaluated with no signs of recurrence.    Upper body skin examination performed today including at least 6 points as noted in physical examination. No lesions suspicious for malignancy noted.             Follow up in about 1 year (around 9/26/2020).

## 2019-10-08 ENCOUNTER — PATIENT MESSAGE (OUTPATIENT)
Dept: DERMATOLOGY | Facility: CLINIC | Age: 74
End: 2019-10-08

## 2019-10-14 DIAGNOSIS — Z12.11 SPECIAL SCREENING FOR MALIGNANT NEOPLASMS, COLON: Primary | ICD-10-CM

## 2019-10-14 RX ORDER — POLYETHYLENE GLYCOL 3350, SODIUM SULFATE ANHYDROUS, SODIUM BICARBONATE, SODIUM CHLORIDE, POTASSIUM CHLORIDE 236; 22.74; 6.74; 5.86; 2.97 G/4L; G/4L; G/4L; G/4L; G/4L
4 POWDER, FOR SOLUTION ORAL ONCE
Qty: 4000 ML | Refills: 0 | Status: SHIPPED | OUTPATIENT
Start: 2019-10-14 | End: 2019-10-14

## 2019-10-17 ENCOUNTER — OFFICE VISIT (OUTPATIENT)
Dept: OTOLARYNGOLOGY | Facility: CLINIC | Age: 74
End: 2019-10-17
Payer: MEDICARE

## 2019-10-17 VITALS
DIASTOLIC BLOOD PRESSURE: 79 MMHG | HEART RATE: 62 BPM | WEIGHT: 130.5 LBS | SYSTOLIC BLOOD PRESSURE: 159 MMHG | BODY MASS INDEX: 20.44 KG/M2 | TEMPERATURE: 100 F

## 2019-10-17 DIAGNOSIS — K13.79 LESION OF PALATE: Primary | ICD-10-CM

## 2019-10-17 PROCEDURE — 3078F PR MOST RECENT DIASTOLIC BLOOD PRESSURE < 80 MM HG: ICD-10-PCS | Mod: CPTII,S$GLB,, | Performed by: OTOLARYNGOLOGY

## 2019-10-17 PROCEDURE — 99999 PR PBB SHADOW E&M-EST. PATIENT-LVL III: ICD-10-PCS | Mod: PBBFAC,,, | Performed by: OTOLARYNGOLOGY

## 2019-10-17 PROCEDURE — 1101F PR PT FALLS ASSESS DOC 0-1 FALLS W/OUT INJ PAST YR: ICD-10-PCS | Mod: CPTII,S$GLB,, | Performed by: OTOLARYNGOLOGY

## 2019-10-17 PROCEDURE — 99213 OFFICE O/P EST LOW 20 MIN: CPT | Mod: S$GLB,,, | Performed by: OTOLARYNGOLOGY

## 2019-10-17 PROCEDURE — 3077F PR MOST RECENT SYSTOLIC BLOOD PRESSURE >= 140 MM HG: ICD-10-PCS | Mod: CPTII,S$GLB,, | Performed by: OTOLARYNGOLOGY

## 2019-10-17 PROCEDURE — 3077F SYST BP >= 140 MM HG: CPT | Mod: CPTII,S$GLB,, | Performed by: OTOLARYNGOLOGY

## 2019-10-17 PROCEDURE — 1101F PT FALLS ASSESS-DOCD LE1/YR: CPT | Mod: CPTII,S$GLB,, | Performed by: OTOLARYNGOLOGY

## 2019-10-17 PROCEDURE — 3078F DIAST BP <80 MM HG: CPT | Mod: CPTII,S$GLB,, | Performed by: OTOLARYNGOLOGY

## 2019-10-17 PROCEDURE — 99999 PR PBB SHADOW E&M-EST. PATIENT-LVL III: CPT | Mod: PBBFAC,,, | Performed by: OTOLARYNGOLOGY

## 2019-10-17 PROCEDURE — 99213 PR OFFICE/OUTPT VISIT, EST, LEVL III, 20-29 MIN: ICD-10-PCS | Mod: S$GLB,,, | Performed by: OTOLARYNGOLOGY

## 2019-10-18 NOTE — PROGRESS NOTES
Chief Complaint   Patient presents with    Post-op Evaluation       HPI   74 y.o. female presents for evaluation of a lesion of the left hard/soft palate junction.  This lesion was noted on routine dental examination.  She has no complaints.  I last saw her 3 months ago.    Review of Systems   Constitutional: Negative for fatigue and unexpected weight change.   HENT: Per HPI.  Eyes: Negative for visual disturbance.   Respiratory: Negative for shortness of breath, hemoptysis   Cardiovascular: Negative for chest pain and palpitations.   Musculoskeletal: Negative for decreased ROM, back pain.   Skin: Negative for rash, sunburn, itching.   Neurological: Negative for dizziness and seizures.   Hematological: Negative for adenopathy. Does not bruise/bleed easily.   Endocrine: Negative for rapid weight loss/weight gain, heat/cold intolerance.     Past Medical History   Patient Active Problem List   Diagnosis    Hypertension    Hyperlipidemia    Coronary artery disease    Adjustment disorder with depressed mood    Status post non-ST elevation myocardial infarction (NSTEMI)    Colon adenoma    Personal history of skin cancer    Weakness of left lower extremity    Food impaction of esophagus    Dysphagia    Nasal sore    Acute renal failure superimposed on stage 3 chronic kidney disease    Hypokalemia    Elevated transaminase level    Lesion of palate           Past Surgical History   Past Surgical History:   Procedure Laterality Date    blephroplasty      BREAST BIOPSY Right     excisional 1985    BUNIONECTOMY  Jan 2013    right foot    CARDIAC CATHETERIZATION      COLONOSCOPY N/A 9/20/2016    Procedure: COLONOSCOPY;  Surgeon: Rachelle Guerra MD;  Location: [address]);  Service: Endoscopy;  Laterality: N/A;    COLONOSCOPY N/A 12/21/2016    Procedure: COLONOSCOPY;  Surgeon: Freedom Sandoval MD;  Location: [address]);  Service: Endoscopy;  Laterality: N/A;  PREP: PREPOPHENOK [place]  IN [facility]    ESOPHAGOGASTRODUODENOSCOPY N/A 7/19/2018    Procedure: EGD (ESOPHAGOGASTRODUODENOSCOPY);  Surgeon: Jefferson Mina MD;  Location: [address];  Service: Endoscopy;  Laterality: N/A;    FOOT SURGERY      right foot    HYSTERECTOMY  1980s    bleeding    orbital fx      TONSILLECTOMY      VAGINA SURGERY           Family History   Family History   Problem Relation Age of Onset    Diabetes Mother     Heart disease Father 57        sudden death    Celiac disease Neg Hx     Colon cancer Neg Hx     Crohn's disease Neg Hx     Esophageal cancer Neg Hx     Inflammatory bowel disease Neg Hx     Irritable bowel syndrome Neg Hx     Liver cancer Neg Hx     Rectal cancer Neg Hx     Stomach cancer Neg Hx     Ulcerative colitis Neg Hx     Melanoma Neg Hx            Social History   .  Social History     Socioeconomic History    Marital status:      Spouse name: Not on file    Number of children: Not on file    Years of education: Not on file    Highest education level: Not on file   Occupational History    Not on file   Social Needs    Financial resource strain: Not on file    Food insecurity:     Worry: Not on file     Inability: Not on file    Transportation needs:     Medical: Not on file     Non-medical: Not on file   Tobacco Use    Smoking status: Former Smoker     Packs/day: 1.00     Years: 15.00     Pack years: 15.00     Types: Cigarettes     Last attempt to quit: 4/6/2004     Years since quitting: 15.5    Smokeless tobacco: Never Used   Substance and Sexual Activity    Alcohol use: Yes     Alcohol/week: 5.0 - 10.0 standard drinks     Types: 5 - 10 Standard drinks or equivalent per week     Comment: 1 glass scotch a day, last use last night    Drug use: No    Sexual activity: Never     Partners: Male     Birth control/protection: None   Lifestyle    Physical activity:     Days per week: Not on file     Minutes per session: Not on file    Stress: Not on file    Relationships    Social connections:     Talks on phone: Not on file     Gets together: Not on file     Attends [employer] service: Not on file     Active member of club or organization: Not on file     Attends meetings of clubs or organizations: Not on file     Relationship status: Not on file   Other Topics Concern    Are you pregnant or think you may be? Not Asked    Breast-feeding Not Asked   Social History Narrative    Not on file         Allergies   Review of patient's allergies indicates:  No Known Allergies        Physical Exam     Vitals:    10/17/19 1028   BP: (!) 159/79   Pulse: 62   Temp: 99.5 °F (37.5 °C)         Body mass index is 20.44 kg/m².      General: AOx3, NAD   Respiratory:  Symmetric chest rise, normal effort  Nose: No gross nasal septal deviation. Inferior Turbinates WNL bilaterally. No septal perforation. No masses/lesions.   Oral Cavity:  Oral Tongue mobile, no lesions noted.  No buccal or FOM lesions.  See photo.  Oropharynx:  No masses/lesions of the posterior pharyngeal wall. Tonsillar fossa without lesions. Soft palate without masses. Midline uvula.   Neck: No scars.  No cervical lymphadenopathy, thyromegaly or thyroid nodules.  Normal range of motion.    Face: House Brackmann I bilaterally.                     Assessment/Plan  Problem List Items Addressed This Visit        ENT    Lesion of palate - Primary     Stable over the last 3 months.  Return to clinic in 3-4 months for repeat evaluation.

## 2019-10-22 ENCOUNTER — PATIENT OUTREACH (OUTPATIENT)
Dept: ADMINISTRATIVE | Facility: HOSPITAL | Age: 74
End: 2019-10-22

## 2019-11-05 ENCOUNTER — LAB VISIT (OUTPATIENT)
Dept: LAB | Facility: HOSPITAL | Age: 74
End: 2019-11-05
Attending: INTERNAL MEDICINE
Payer: MEDICARE

## 2019-11-05 ENCOUNTER — OFFICE VISIT (OUTPATIENT)
Dept: INTERNAL MEDICINE | Facility: CLINIC | Age: 74
End: 2019-11-05
Payer: MEDICARE

## 2019-11-05 VITALS
DIASTOLIC BLOOD PRESSURE: 62 MMHG | TEMPERATURE: 98 F | OXYGEN SATURATION: 97 % | WEIGHT: 134.5 LBS | SYSTOLIC BLOOD PRESSURE: 160 MMHG | BODY MASS INDEX: 21.11 KG/M2 | HEART RATE: 67 BPM | HEIGHT: 67 IN

## 2019-11-05 DIAGNOSIS — J40 BRONCHITIS: ICD-10-CM

## 2019-11-05 DIAGNOSIS — R09.89: Primary | ICD-10-CM

## 2019-11-05 DIAGNOSIS — I10 ESSENTIAL HYPERTENSION: ICD-10-CM

## 2019-11-05 LAB
ALBUMIN SERPL BCP-MCNC: 4.1 G/DL (ref 3.5–5.2)
ALP SERPL-CCNC: 78 U/L (ref 55–135)
ALT SERPL W/O P-5'-P-CCNC: 57 U/L (ref 10–44)
ANION GAP SERPL CALC-SCNC: 12 MMOL/L (ref 8–16)
AST SERPL-CCNC: 85 U/L (ref 10–40)
BILIRUB SERPL-MCNC: 1 MG/DL (ref 0.1–1)
BUN SERPL-MCNC: 15 MG/DL (ref 8–23)
CALCIUM SERPL-MCNC: 9.6 MG/DL (ref 8.7–10.5)
CHLORIDE SERPL-SCNC: 98 MMOL/L (ref 95–110)
CO2 SERPL-SCNC: 30 MMOL/L (ref 23–29)
CREAT SERPL-MCNC: 0.9 MG/DL (ref 0.5–1.4)
EST. GFR  (AFRICAN AMERICAN): >60 ML/MIN/1.73 M^2
EST. GFR  (NON AFRICAN AMERICAN): >60 ML/MIN/1.73 M^2
GLUCOSE SERPL-MCNC: 102 MG/DL (ref 70–110)
POTASSIUM SERPL-SCNC: 3.5 MMOL/L (ref 3.5–5.1)
PROT SERPL-MCNC: 7.4 G/DL (ref 6–8.4)
SODIUM SERPL-SCNC: 140 MMOL/L (ref 136–145)

## 2019-11-05 PROCEDURE — 3078F PR MOST RECENT DIASTOLIC BLOOD PRESSURE < 80 MM HG: ICD-10-PCS | Mod: CPTII,S$GLB,, | Performed by: INTERNAL MEDICINE

## 2019-11-05 PROCEDURE — 1101F PR PT FALLS ASSESS DOC 0-1 FALLS W/OUT INJ PAST YR: ICD-10-PCS | Mod: CPTII,S$GLB,, | Performed by: INTERNAL MEDICINE

## 2019-11-05 PROCEDURE — 80053 COMPREHEN METABOLIC PANEL: CPT

## 2019-11-05 PROCEDURE — 99214 OFFICE O/P EST MOD 30 MIN: CPT | Mod: S$GLB,,, | Performed by: INTERNAL MEDICINE

## 2019-11-05 PROCEDURE — 3077F PR MOST RECENT SYSTOLIC BLOOD PRESSURE >= 140 MM HG: ICD-10-PCS | Mod: CPTII,S$GLB,, | Performed by: INTERNAL MEDICINE

## 2019-11-05 PROCEDURE — 36415 COLL VENOUS BLD VENIPUNCTURE: CPT

## 2019-11-05 PROCEDURE — 99499 RISK ADDL DX/OHS AUDIT: ICD-10-PCS | Mod: S$GLB,,, | Performed by: INTERNAL MEDICINE

## 2019-11-05 PROCEDURE — 99999 PR PBB SHADOW E&M-EST. PATIENT-LVL V: ICD-10-PCS | Mod: PBBFAC,,, | Performed by: INTERNAL MEDICINE

## 2019-11-05 PROCEDURE — 3077F SYST BP >= 140 MM HG: CPT | Mod: CPTII,S$GLB,, | Performed by: INTERNAL MEDICINE

## 2019-11-05 PROCEDURE — 99999 PR PBB SHADOW E&M-EST. PATIENT-LVL V: CPT | Mod: PBBFAC,,, | Performed by: INTERNAL MEDICINE

## 2019-11-05 PROCEDURE — 3078F DIAST BP <80 MM HG: CPT | Mod: CPTII,S$GLB,, | Performed by: INTERNAL MEDICINE

## 2019-11-05 PROCEDURE — 99499 UNLISTED E&M SERVICE: CPT | Mod: S$GLB,,, | Performed by: INTERNAL MEDICINE

## 2019-11-05 PROCEDURE — 99214 PR OFFICE/OUTPT VISIT, EST, LEVL IV, 30-39 MIN: ICD-10-PCS | Mod: S$GLB,,, | Performed by: INTERNAL MEDICINE

## 2019-11-05 PROCEDURE — 1101F PT FALLS ASSESS-DOCD LE1/YR: CPT | Mod: CPTII,S$GLB,, | Performed by: INTERNAL MEDICINE

## 2019-11-05 RX ORDER — ALPRAZOLAM 0.25 MG/1
TABLET ORAL
Qty: 20 TABLET | Refills: 0 | Status: SHIPPED | OUTPATIENT
Start: 2019-11-05 | End: 2020-02-21 | Stop reason: SDUPTHER

## 2019-11-05 RX ORDER — MIRTAZAPINE 15 MG/1
15 TABLET, FILM COATED ORAL NIGHTLY
Qty: 90 TABLET | Refills: 1 | Status: SHIPPED | OUTPATIENT
Start: 2019-11-05

## 2019-11-05 RX ORDER — AMLODIPINE BESYLATE 5 MG/1
5 TABLET ORAL DAILY
Qty: 90 TABLET | Refills: 3 | Status: SHIPPED | OUTPATIENT
Start: 2019-11-05

## 2019-11-05 RX ORDER — ALBUTEROL SULFATE 90 UG/1
AEROSOL, METERED RESPIRATORY (INHALATION)
Qty: 18 INHALER | Refills: 3 | Status: SHIPPED | OUTPATIENT
Start: 2019-11-05

## 2019-11-12 ENCOUNTER — HOSPITAL ENCOUNTER (OUTPATIENT)
Dept: RADIOLOGY | Facility: HOSPITAL | Age: 74
Discharge: HOME | End: 2019-11-12
Attending: INTERNAL MEDICINE
Payer: MEDICARE

## 2019-11-12 DIAGNOSIS — R09.89: ICD-10-CM

## 2019-11-12 PROCEDURE — 76775 US EXAM ABDO BACK WALL LIM: CPT | Mod: TC

## 2019-11-12 PROCEDURE — 76775 US ABDOMINAL AORTA: ICD-10-PCS | Mod: 26,,, | Performed by: RADIOLOGY

## 2019-11-12 PROCEDURE — 76775 US EXAM ABDO BACK WALL LIM: CPT | Mod: 26,,, | Performed by: RADIOLOGY

## 2019-11-18 NOTE — PROGRESS NOTES
Subjective:       Patient ID: Jacqueline O Favret is a 74 y.o. female.    Chief Complaint: Follow-up    HPIPt is feeling well - plans a trip thru the [place] for Thanksgiving.  No CP or SOB.  No recent falls.  Review of Systems   Constitutional: Negative for activity change and unexpected weight change.   HENT: Negative for hearing loss, rhinorrhea and trouble swallowing.    Eyes: Negative for discharge and visual disturbance.   Respiratory: Negative for chest tightness, shortness of breath (PND or orthopnea) and wheezing.    Cardiovascular: Negative for chest pain and palpitations.   Gastrointestinal: Negative for abdominal pain, blood in stool, constipation, diarrhea, nausea and vomiting.   Endocrine: Negative for polydipsia and polyuria.   Genitourinary: Negative for difficulty urinating, dysuria, hematuria and menstrual problem.   Musculoskeletal: Negative for arthralgias, joint swelling and neck pain.   Neurological: Negative for seizures, syncope, weakness and headaches.   Psychiatric/Behavioral: Negative for confusion and dysphoric mood.       Objective:      Physical Exam   Constitutional: She is oriented to person, place, and time. She appears well-developed and well-nourished. No distress.   HENT:   Head: Normocephalic.   Mouth/Throat: Oropharynx is clear and moist.   Neck: Neck supple. No JVD present. No thyromegaly present.   Cardiovascular: Normal rate, regular rhythm, normal heart sounds and intact distal pulses. Exam reveals no gallop and no friction rub.   No murmur heard.  Pulmonary/Chest: Effort normal and breath sounds normal. She has no wheezes. She has no rales.   Abdominal: Soft. Bowel sounds are normal. She exhibits no distension and no mass. There is no tenderness. There is no rebound and no guarding.   Musculoskeletal: She exhibits no edema.   Lymphadenopathy:     She has no cervical adenopathy.   Neurological: She is alert and oriented to person, place, and time. She has normal  reflexes.   Skin: Skin is warm and dry.   Psychiatric: She has a normal mood and affect. Her behavior is normal. Judgment and thought content normal.     Decreased peripheral pulses but feet pink and warm  Assessment:       1. Bilaterally decreased femoral pulses    2. Essential hypertension        Plan:   Bilaterally decreased femoral pulses  -     US Abdominal Aorta; Future; Expected date: 11/05/2019    Essential hypertension  -     Comprehensive metabolic panel; Future; Expected date: 11/05/2019  Uncontrolled increase amlodipine to 5mg daily  Other orders  -     amLODIPine (NORVASC) 5 MG tablet; Take 1 tablet (5 mg total) by mouth once daily.  Dispense: 90 tablet; Refill: 3  -     ALPRAZolam (XANAX) 0.25 MG tablet; TAKE 1/2 TO 1 TABLET BY MOUTH BEFORE FLIGHT  Dispense: 20 tablet; Refill: 0  -     mirtazapine (REMERON) 15 MG tablet; Take 1 tablet (15 mg total) by mouth every evening.  Dispense: 90 tablet; Refill: 1 - 5 years since 's death some depression - start remeron nightly

## 2019-12-09 ENCOUNTER — ANESTHESIA EVENT (OUTPATIENT)
Dept: ENDOSCOPY | Facility: HOSPITAL | Age: 74
End: 2019-12-09
Payer: MEDICARE

## 2019-12-09 ENCOUNTER — HOSPITAL ENCOUNTER (OUTPATIENT)
Facility: HOSPITAL | Age: 74
Discharge: HOME | End: 2019-12-09
Attending: COLON & RECTAL SURGERY | Admitting: COLON & RECTAL SURGERY
Payer: MEDICARE

## 2019-12-09 ENCOUNTER — ANESTHESIA (OUTPATIENT)
Dept: ENDOSCOPY | Facility: HOSPITAL | Age: 74
End: 2019-12-09
Payer: MEDICARE

## 2019-12-09 VITALS
HEIGHT: 67 IN | TEMPERATURE: 98 F | DIASTOLIC BLOOD PRESSURE: 63 MMHG | OXYGEN SATURATION: 97 % | SYSTOLIC BLOOD PRESSURE: 131 MMHG | HEART RATE: 69 BPM | WEIGHT: 135.56 LBS | RESPIRATION RATE: 16 BRPM | BODY MASS INDEX: 21.28 KG/M2

## 2019-12-09 DIAGNOSIS — Z86.010 HISTORY OF COLON POLYPS: Primary | ICD-10-CM

## 2019-12-09 PROCEDURE — 88305 TISSUE EXAM BY PATHOLOGIST: CPT | Performed by: PATHOLOGY

## 2019-12-09 PROCEDURE — 27201012 HC FORCEPS, HOT/COLD, DISP: Performed by: COLON & RECTAL SURGERY

## 2019-12-09 PROCEDURE — E9220 PRA ENDO ANESTHESIA: HCPCS | Mod: PT,,, | Performed by: NURSE ANESTHETIST, CERTIFIED REGISTERED

## 2019-12-09 PROCEDURE — 37000008 HC ANESTHESIA 1ST 15 MINUTES: Performed by: COLON & RECTAL SURGERY

## 2019-12-09 PROCEDURE — 45380 PR COLONOSCOPY,BIOPSY: ICD-10-PCS | Mod: PT,,, | Performed by: COLON & RECTAL SURGERY

## 2019-12-09 PROCEDURE — 88305 TISSUE EXAM BY PATHOLOGIST: CPT | Mod: 26,,, | Performed by: PATHOLOGY

## 2019-12-09 PROCEDURE — 45380 COLONOSCOPY AND BIOPSY: CPT | Mod: PT,,, | Performed by: COLON & RECTAL SURGERY

## 2019-12-09 PROCEDURE — 88305 TISSUE EXAM BY PATHOLOGIST: ICD-10-PCS | Mod: 26,,, | Performed by: PATHOLOGY

## 2019-12-09 PROCEDURE — 45380 COLONOSCOPY AND BIOPSY: CPT | Performed by: COLON & RECTAL SURGERY

## 2019-12-09 PROCEDURE — 63600175 PHARM REV CODE 636 W HCPCS: Performed by: COLON & RECTAL SURGERY

## 2019-12-09 PROCEDURE — 37000009 HC ANESTHESIA EA ADD 15 MINS: Performed by: COLON & RECTAL SURGERY

## 2019-12-09 PROCEDURE — 63600175 PHARM REV CODE 636 W HCPCS: Performed by: NURSE ANESTHETIST, CERTIFIED REGISTERED

## 2019-12-09 PROCEDURE — E9220 PRA ENDO ANESTHESIA: ICD-10-PCS | Mod: PT,,, | Performed by: NURSE ANESTHETIST, CERTIFIED REGISTERED

## 2019-12-09 RX ORDER — PROPOFOL 10 MG/ML
VIAL (ML) INTRAVENOUS
Status: DISCONTINUED | OUTPATIENT
Start: 2019-12-09 | End: 2019-12-09

## 2019-12-09 RX ORDER — PROPOFOL 10 MG/ML
VIAL (ML) INTRAVENOUS CONTINUOUS PRN
Status: DISCONTINUED | OUTPATIENT
Start: 2019-12-09 | End: 2019-12-09

## 2019-12-09 RX ORDER — SODIUM CHLORIDE 9 MG/ML
INJECTION, SOLUTION INTRAVENOUS CONTINUOUS
Status: DISCONTINUED | OUTPATIENT
Start: 2019-12-09 | End: 2019-12-09 | Stop reason: HOSPADM

## 2019-12-09 RX ORDER — LIDOCAINE HCL/PF 100 MG/5ML
SYRINGE (ML) INTRAVENOUS
Status: DISCONTINUED | OUTPATIENT
Start: 2019-12-09 | End: 2019-12-09

## 2019-12-09 RX ADMIN — PROPOFOL 70 MG: 10 INJECTION, EMULSION INTRAVENOUS at 01:12

## 2019-12-09 RX ADMIN — PROPOFOL 200 MCG/KG/MIN: 10 INJECTION, EMULSION INTRAVENOUS at 01:12

## 2019-12-09 RX ADMIN — LIDOCAINE HYDROCHLORIDE 50 MG: 20 INJECTION, SOLUTION INTRAVENOUS at 01:12

## 2019-12-09 RX ADMIN — SODIUM CHLORIDE: 0.9 INJECTION, SOLUTION INTRAVENOUS at 12:12

## 2019-12-09 RX ADMIN — SODIUM CHLORIDE: 0.9 INJECTION, SOLUTION INTRAVENOUS at 01:12

## 2019-12-09 NOTE — DISCHARGE INSTRUCTIONS
Colonoscopy     A camera attached to a flexible tube with a viewing lens is used to take video pictures.     Colonoscopy is a test to view the inside of your lower digestive tract (colon and rectum). Sometimes it can show the last part of the small intestine (ileum). During the test, small pieces of tissue may be removed for testing. This is called a biopsy. Small growths, such as polyps, may also be removed.   Why is colonoscopy done?  The test is done to help look for colon cancer. And it can help find the source of abdominal pain, bleeding, and changes in bowel habits. It may be needed once a year, depending on factors such as your:  · Age  · Health history  · Family health history  · Symptoms  · Results from any prior colonoscopy  Risks and possible complications  These include:  · Bleeding               · A puncture or tear in the colon   · Risks of anesthesia  · A cancer lesion not being seen  Getting ready   To prepare for the test:  · Talk with your healthcare provider about the risks of the test (see below). Also ask your healthcare provider about alternatives to the test.  · Tell your healthcare provider about any medicines you take. Also tell him or her about any health conditions you may have.  · Make sure your rectum and colon are empty for the test. Follow the diet and bowel prep instructions exactly. If you dont, the test may need to be rescheduled.  · Plan for a friend or family member to drive you home after the test.     Colonoscopy provides an inside view of the entire colon.     You may discuss the results with your doctor right away or at a future visit.  During the test   The test is usually done in the hospital on an outpatient basis. This means you go home the same day. The procedure takes about 30 minutes. During that time:  · You are given relaxing (sedating) medicine through an IV line. You may be drowsy, or fully asleep.  · The healthcare provider will first give you a physical exam to  check for anal and rectal problems.  · Then the anus is lubricated and the scope inserted.  · If you are awake, you may have a feeling similar to needing to have a bowel movement. You may also feel pressure as air is pumped into the colon. Its OK to pass gas during the procedure.  · Biopsy, polyp removal, or other treatments may be done during the test.  After the test   You may have gas right after the test. It can help to try to pass it to help prevent later bloating. Your healthcare provider may discuss the results with you right away. Or you may need to schedule a follow-up visit to talk about the results. After the test, you can go back to your normal eating and other activities. You may be tired from the sedation and need to rest for a few hours.  Date Last Reviewed: 11/1/2016 © 2000-2017 [employer]. [address] [place] [address], [place] [address]. All rights reserved. This information is not intended as a substitute for professional medical care. Always follow your healthcare professional's instructions.

## 2019-12-09 NOTE — ANESTHESIA POSTPROCEDURE EVALUATION
Anesthesia Post Evaluation    Patient: Jacqueline O Favret    Procedure(s) Performed: Procedure(s) (LRB):  COLONOSCOPY (N/A)    Final Anesthesia Type: general    Patient location during evaluation: GI PACU  Patient participation: Yes- Able to Participate  Level of consciousness: awake and alert and oriented  Post-procedure vital signs: reviewed and stable  Pain management: adequate  Airway patency: patent    PONV status at discharge: No PONV  Anesthetic complications: no      Cardiovascular status: hemodynamically stable  Respiratory status: unassisted, spontaneous ventilation and room air  Hydration status: euvolemic  Follow-up not needed.          Vitals Value Taken Time   /57 12/9/2019  2:20 PM   Temp 36.7 °C (98.1 °F) 12/9/2019  2:20 PM   Pulse 67 12/9/2019  2:20 PM   Resp 16 12/9/2019  2:20 PM   SpO2 99 % 12/9/2019  2:20 PM         No case tracking events are documented in the log.      Pain/Susan Score: Susan Score: 9 (12/9/2019  2:20 PM)

## 2019-12-09 NOTE — H&P
COLONOSCOPY HISTORY AND PHYSICAL EXAM    Procedure : Colonoscopy      INDICATIONS: personal history of colon polyps    Family Hx of CRC: none    Last Colonoscopy:  2016  Findings: transverse colon - adenoma       Past Medical History:   Diagnosis Date    Abnormal liver enzymes 4/6/2015    Acute on chronic kidney disease, stage 3 4/18/2013    Alcohol-induced acute pancreatitis 8/16/2015    Anemia     Basal cell carcinoma 1985    nose    Bunion, right foot 12/14/2012    Compression fracture 11/14/2013    MI (myocardial infarction) 1991    PAF (paroxysmal atrial fibrillation) 9/8/2015    During acute illness     SCC (squamous cell carcinoma) 05/11/2016    in situ left lower lip, nasal bridge     Sedation Problems: NO  Family History   Problem Relation Age of Onset    Diabetes Mother     Heart disease Father 57        sudden death    Celiac disease Neg Hx     Colon cancer Neg Hx     Crohn's disease Neg Hx     Esophageal cancer Neg Hx     Inflammatory bowel disease Neg Hx     Irritable bowel syndrome Neg Hx     Liver cancer Neg Hx     Rectal cancer Neg Hx     Stomach cancer Neg Hx     Ulcerative colitis Neg Hx     Melanoma Neg Hx      Fam Hx of Sedation Problems: NO  Social History     Socioeconomic History    Marital status:      Spouse name: Not on file    Number of children: Not on file    Years of education: Not on file    Highest education level: Not on file   Occupational History    Not on file   Social Needs    Financial resource strain: Not hard at all    Food insecurity:     Worry: Never true     Inability: Never true    Transportation needs:     Medical: No     Non-medical: No   Tobacco Use    Smoking status: Former Smoker     Packs/day: 1.00     Years: 15.00     Pack years: 15.00     Types: Cigarettes     Last attempt to quit: 4/6/2004     Years since quitting: 15.6    Smokeless tobacco: Never Used   Substance and Sexual Activity    Alcohol use: Yes      "Alcohol/week: 5.0 - 10.0 standard drinks     Types: 5 - 10 Standard drinks or equivalent per week     Frequency: 4 or more times a week     Drinks per session: 1 or 2     Binge frequency: Never     Comment: 1 glass scotch a day, last use last night    Drug use: No    Sexual activity: Never     Partners: Male     Birth control/protection: None   Lifestyle    Physical activity:     Days per week: 7 days     Minutes per session: 10 min    Stress: Not on file   Relationships    Social connections:     Talks on phone: More than three times a week     Gets together: Once a week     Attends Buddhist service: Not on file     Active member of club or organization: Yes     Attends meetings of clubs or organizations: More than 4 times per year     Relationship status:    Other Topics Concern    Are you pregnant or think you may be? Not Asked    Breast-feeding Not Asked   Social History Narrative    Not on file       Review of Systems - Negative except   Respiratory ROS: no dyspnea  Cardiovascular ROS: no exertional chest pain  Gastrointestinal ROS: NO abdominal discomfort,  NO rectal bleeding  Musculoskeletal ROS: no muscular pain  Neurological ROS: no recent stroke    Physical Exam:  BP (!) 151/65 (BP Location: Left arm, Patient Position: Lying)   Pulse 70   Temp 98.2 °F (36.8 °C) (Temporal)   Resp 18   Ht 5' 7" (1.702 m)   Wt 61.5 kg (135 lb 9.3 oz)   LMP  (LMP Unknown)   SpO2 96%   BMI 21.24 kg/m²   General: no distress  Head: normocephalic  Mallampati Score   Neck: supple, symmetrical, trachea midline  Lungs:  clear to auscultation bilaterally  Heart: regular rate and rhythm  Abdomen: soft, non-tender non-distented; bowel sounds normal; no masses,  no organomegaly  Extremities: no cyanosis or edema, or clubbing    ASA:  III    PLAN  COLONOSCOPY.    SedationPlan :MAC    The details of the procedure, the possible need for biopsy or polypectomy and the potential risks including bleeding, perforation, " missed polyps were discussed in detail.

## 2019-12-09 NOTE — TRANSFER OF CARE
"Anesthesia Transfer of Care Note    Patient: Jacqueline O Favret    Procedure(s) Performed: Procedure(s) (LRB):  COLONOSCOPY (N/A)    Patient location: PACU    Anesthesia Type: general    Transport from OR: Transported from OR on 2-3 L/min O2 by NC with adequate spontaneous ventilation    Post pain: adequate analgesia    Post assessment: no apparent anesthetic complications and tolerated procedure well    Post vital signs: stable    Level of consciousness: responds to stimulation and sedated    Nausea/Vomiting: no nausea/vomiting    Complications: none    Transfer of care protocol was followed      Last vitals:   Visit Vitals  BP (!) 151/65 (BP Location: Left arm, Patient Position: Lying)   Pulse 70   Temp 36.8 °C (98.2 °F) (Temporal)   Resp 18   Ht 5' 7" (1.702 m)   Wt 61.5 kg (135 lb 9.3 oz)   LMP  (LMP Unknown)   SpO2 96%   BMI 21.24 kg/m²     "

## 2019-12-09 NOTE — PROVATION PATIENT INSTRUCTIONS
Discharge Summary/Instructions after an Endoscopic Procedure  Patient Name: Jacqueline Favret  Patient MRN: [id number]  Patient YOB: 1945 Monday, December 09, 2019  Dejuan Calhoun MD  RESTRICTIONS:  During your procedure today, you received medications for sedation.  These   medications may affect your judgment, balance and coordination.  Therefore,   for 24 hours, you have the following restrictions:   - DO NOT drive a car, operate machinery, make legal/financial decisions,   sign important papers or drink alcohol.    ACTIVITY:  Today: no heavy lifting, straining or running due to procedural   sedation/anesthesia.  The following day: return to full activity including work.  DIET:  Eat and drink normally unless instructed otherwise.     TREATMENT FOR COMMON SIDE EFFECTS:  - Mild abdominal pain, nausea, belching, bloating or excessive gas:  rest,   eat lightly and use a heating pad.  - Sore Throat: treat with throat lozenges and/or gargle with warm salt   water.  - Because air was used during the procedure, expelling large amounts of air   from your rectum or belching is normal.  - If a bowel prep was taken, you may not have a bowel movement for 1-3 days.    This is normal.  SYMPTOMS TO WATCH FOR AND REPORT TO YOUR PHYSICIAN:  1. Abdominal pain or bloating, other than gas cramps.  2. Chest pain.  3. Back pain.  4. Signs of infection such as: chills or fever occurring within 24 hours   after the procedure.  5. Rectal bleeding, which would show as bright red, maroon, or black stools.   (A tablespoon of blood from the rectum is not serious, especially if   hemorrhoids are present.)  6. Vomiting.  7. Weakness or dizziness.  GO DIRECTLY TO THE NEAREST EMERGENCY ROOM IF YOU HAVE ANY OF THE FOLLOWING:      Difficulty breathing              Chills and/or fever over 101 F   Persistent vomiting and/or vomiting blood   Severe abdominal pain   Severe chest pain   Black, tarry stools   Bleeding- more than one  tablespoon   Any other symptom or condition that you feel may need urgent attention  Your doctor recommends these additional instructions:  If any biopsies were taken, your doctors clinic will contact you in 1 to 2   weeks with any results.  - Patient has a contact number available for emergencies.  The signs and   symptoms of potential delayed complications were discussed with the   patient.  Return to normal activities tomorrow.  Written discharge   instructions were provided to the patient.   - Discharge patient to home (ambulatory).   - Resume regular diet indefinitely.   - Repeat colonoscopy in 5 years for surveillance.   - Continue present medications.  For questions, problems or results please call your physician - Dejuan Calhoun MD at Work:  [phone].  [facility], EMERGENCY ROOM PHONE NUMBER: [phone]  IF A COMPLICATION OR EMERGENCY SITUATION ARISES AND YOU ARE UNABLE TO REACH   YOUR PHYSICIAN - GO DIRECTLY TO THE EMERGENCY ROOM.  Dejuan Calhoun MD  12/9/2019 2:21:49 PM  This report has been verified and signed electronically.  PROVATION

## 2019-12-09 NOTE — ANESTHESIA PREPROCEDURE EVALUATION
12/09/2019  Jacqueline O Favret is a 74 y.o., female.  Past Medical History:   Diagnosis Date    Abnormal liver enzymes 4/6/2015    Acute on chronic kidney disease, stage 3 4/18/2013    Alcohol-induced acute pancreatitis 8/16/2015    Anemia     Basal cell carcinoma 1985    nose    Bunion, right foot 12/14/2012    Compression fracture 11/14/2013    MI (myocardial infarction) 1991    PAF (paroxysmal atrial fibrillation) 9/8/2015    During acute illness     SCC (squamous cell carcinoma) 05/11/2016    in situ left lower lip, nasal bridge     Past Surgical History:   Procedure Laterality Date    blephroplasty      BREAST BIOPSY Right     excisional 1985    BUNIONECTOMY  Jan 2013    right foot    CARDIAC CATHETERIZATION      COLONOSCOPY N/A 9/20/2016    Procedure: COLONOSCOPY;  Surgeon: Rachelle Guerra MD;  [facility]  Service: Endoscopy;  Laterality: N/A;    COLONOSCOPY N/A 12/21/2016    Procedure: COLONOSCOPY;  Surgeon: Freedom Sandoval MD;  [facility]);  Service: Endoscopy;  Laterality: N/A;  PREP: PREPOPIK  SCGEDULKE EAFRLY IN [facility]    ESOPHAGOGASTRODUODENOSCOPY N/A 7/19/2018    Procedure: EGD (ESOPHAGOGASTRODUODENOSCOPY);  Surgeon: Jefferson Mina MD;  [facility]);  Service: Endoscopy;  Laterality: N/A;    FOOT SURGERY      right foot    HYSTERECTOMY  1980s    bleeding    orbital fx      TONSILLECTOMY      VAGINA SURGERY         Anesthesia Evaluation    I have reviewed the Patient Summary Reports.    I have reviewed the Nursing Notes.   I have reviewed the Medications.     Review of Systems  Hematology/Oncology:  Hematology Normal   Oncology Normal     EENT/Dental:EENT/Dental Normal   Cardiovascular:   Hypertension, well controlled Past MI CAD asymptomatic     Pulmonary:  Pulmonary Normal    Renal/:  Renal/ Normal      Hepatic/GI:  Hepatic/GI Normal    Musculoskeletal:  Musculoskeletal Normal    Neurological:  Neurology Normal    Endocrine:  Endocrine Normal    Dermatological:  Skin Normal    Psych:   Psychiatric History          Physical Exam  General:  Well nourished    Airway/Jaw/Neck:  Airway Findings: Mouth Opening: Normal Tongue: Normal  General Airway Assessment: Adult  Mallampati: II  Improves to II with phonation.  TM Distance: Normal, at least 6 cm     Eyes/Ears/Nose:  EYES/EARS/NOSE FINDINGS: Normal   Dental:  Dental Findings: In tact   Chest/Lungs:  Chest/Lungs Clear    Heart/Vascular:  Heart Findings: Normal Heart murmur: negative Vascular Findings: Normal    Abdomen:  Abdomen Findings: Normal    Musculoskeletal:  Musculoskeletal Findings: Normal   Skin:  Skin Findings: Normal    Mental Status:  Mental Status Findings: Normal        Anesthesia Plan  Type of Anesthesia, risks & benefits discussed:  Anesthesia Type:  general  Patient's Preference: General  Intra-op Monitoring Plan: standard ASA monitors  Intra-op Monitoring Plan Comments:   Post Op Pain Control Plan: multimodal analgesia  Post Op Pain Control Plan Comments:   Induction:   IV  Beta Blocker:  Patient is on a Beta-Blocker and has received one dose within the past 24 hours (No further documentation required).       Informed Consent: Patient understands risks and agrees with Anesthesia plan.  Questions answered. Anesthesia consent signed with patient.  ASA Score: 3     Day of Surgery Review of History & Physical:    H&P update referred to the surgeon.         Ready For Surgery From Anesthesia Perspective.

## 2019-12-11 ENCOUNTER — OFFICE VISIT (OUTPATIENT)
Dept: OPTOMETRY | Facility: CLINIC | Age: 74
End: 2019-12-11
Payer: MEDICARE

## 2019-12-11 DIAGNOSIS — H52.201 HYPEROPIA OF RIGHT EYE WITH ASTIGMATISM: ICD-10-CM

## 2019-12-11 DIAGNOSIS — H52.4 PRESBYOPIA OF BOTH EYES: ICD-10-CM

## 2019-12-11 DIAGNOSIS — H25.13 NUCLEAR SCLEROSIS, BILATERAL: Primary | ICD-10-CM

## 2019-12-11 DIAGNOSIS — H52.01 HYPEROPIA OF RIGHT EYE WITH ASTIGMATISM: ICD-10-CM

## 2019-12-11 DIAGNOSIS — H52.02 HYPERMETROPIA OF LEFT EYE: ICD-10-CM

## 2019-12-11 DIAGNOSIS — Z46.0 ENCOUNTER FOR FITTING OR ADJUSTMENT OF SPECTACLES OR CONTACT LENSES: Primary | ICD-10-CM

## 2019-12-11 PROCEDURE — 92015 PR REFRACTION: ICD-10-PCS | Mod: S$GLB,,, | Performed by: OPTOMETRIST

## 2019-12-11 PROCEDURE — 99999 PR PBB SHADOW E&M-EST. PATIENT-LVL II: ICD-10-PCS | Mod: PBBFAC,,, | Performed by: OPTOMETRIST

## 2019-12-11 PROCEDURE — 92012 INTRM OPH EXAM EST PATIENT: CPT | Mod: S$GLB,,, | Performed by: OPTOMETRIST

## 2019-12-11 PROCEDURE — 92015 DETERMINE REFRACTIVE STATE: CPT | Mod: S$GLB,,, | Performed by: OPTOMETRIST

## 2019-12-11 PROCEDURE — 92012 PR EYE EXAM, EST PATIENT,INTERMED: ICD-10-PCS | Mod: S$GLB,,, | Performed by: OPTOMETRIST

## 2019-12-11 PROCEDURE — 99999 PR PBB SHADOW E&M-EST. PATIENT-LVL II: CPT | Mod: PBBFAC,,, | Performed by: OPTOMETRIST

## 2019-12-11 PROCEDURE — 99499 UNLISTED E&M SERVICE: CPT | Mod: S$GLB,,, | Performed by: OPTOMETRIST

## 2019-12-11 PROCEDURE — 92310 PR CONTACT LENS FITTING (NO CHANGE): ICD-10-PCS | Mod: CSM,,, | Performed by: OPTOMETRIST

## 2019-12-11 PROCEDURE — 92310 CONTACT LENS FITTING OU: CPT | Mod: CSM,,, | Performed by: OPTOMETRIST

## 2019-12-11 PROCEDURE — 99499 NO LOS: ICD-10-PCS | Mod: S$GLB,,, | Performed by: OPTOMETRIST

## 2019-12-11 NOTE — PATIENT INSTRUCTIONS
Bilateral nuclear sclerosis of lens, consistent with age.  S/P blepharoplasty bilaterally.  S/P surgery for repair of apparent orbital fracture secondary to fall.  Dilated fundus exam (DFE) not done today, per request.  Suggest return when convenient for no charge visit for DFE.    Hyperopia with astigmatism in the right eye, and hyperopia in the left eye.  Good best-corrected VA in each eye per post CL refraction.  Presbyopia consistent with age.  New spectacle lens Rx issued for use in lieu of CLs    Good lens fit in each eye with present monovision SCLs (Biofinity Toric in OD for distance and Biofinity spherical lens in OS for near).  Wearing CLs well in each eye.  Showing need for slight power change in each lens.  New/updated CL Rx issued    Repeat general eye exam and refraction and CL follow up in one year.   
oral

## 2019-12-11 NOTE — PROGRESS NOTES
HPI     eye exam and contact lens follow-up      Additional comments: General eye exam and refraction, and contact lens   follow-up.  Notes some difficulty with near VA with CLs.               Comments     Patient's age: 74 yr.o. WF   Occupation: Retired   Approximate date of last general  eye examination:  12/05/2018  Name of last eye doctor seen: Dr. Goodman   City/State: [place], [place]   Wears glasses? Yes      If yes, wears  Full-time   Present glasses are: Progressive   Approximate age of present glasses:  few yrs old (>5 years)  Got new glasses following last exam, or subsequently?:  No (!)   Any problem with VA with glasses?  wears rarely, when without CLs   Wears CLs?:  Yes      Last CL Rx:  OD   Biofinity Toric  8.7  14.5  +2.75 -1.25 x 100     Distance                            OS  Biofinity 8.6   14.0   +3.25  Near   Headaches? No   Eye pain/discomfort?   No   Flashes?  No   Floaters?  No   Diplopia/Double vision? No   Patient's Ocular History:          Any eye surgeries? Yes - Blepharoplasty - Dr Quilse/ Secondary   Blepharoplasy - Dr Gao              No other eye surgery.          Any eye injury?  Yes - fell and struck face - orbital fracture?          Any treatment for eye disease?  No   Family history of eye disease?  No   Significant patient medical history:         1. Diabetes?  No      If yes, IDDM or NIDDM? NA          2. HBP?  Yes, controlled by medication               3. Other (describe):  High cholesterol - takes Crestor    ! OTC eyedrops currently using:  Occasional use of artifical tears for   dryness    ! Prescription eye meds currently using:  No    ! Any history of allergy/adverse reaction to any eye meds used   previously?  No    ! Any history of allergy/adverse reaction to eyedrops used during prior   eye exam(s)? No    ! Any history of allergy/adverse reaction to Novacaine or similar meds?   No    ! Any history of allergy/adverse reaction to Epinephrine or similar meds?   No    !  "Patient okay with use of anesthetic eyedrops to check eye pressure?    Yes        ! Patient okay with use of eyedrops to dilate pupils today?  Yes    !  Allergies/Medications/Medical History/Family History reviewed today?    Yes         PD =   61/57   Desired reading distance =  17"                            Last edited by Jeromy Goodman, OD on 12/11/2019  7:16 AM. (History)            Assessment /Plan     For exam results, see Encounter Report.    1. Nuclear sclerosis, bilateral     2. Hyperopia of right eye with astigmatism     3. Hypermetropia of left eye     4. Presbyopia of both eyes                  Bilateral nuclear sclerosis of lens, consistent with age.  S/P blepharoplasty bilaterally.  S/P surgery for repair of apparent orbital fracture secondary to fall.  Dilated fundus exam (DFE) not done today, per request.  Suggest return when convenient for no charge visit for DFE.    Hyperopia with astigmatism in the right eye, and hyperopia in the left eye.  Good best-corrected VA in each eye per post CL refraction.  Presbyopia consistent with age.  New spectacle lens Rx issued for use in lieu of CLs    Good lens fit in each eye with present monovision SCLs (Biofinity Toric in OD for distance and Biofinity spherical lens in OS for near).  Wearing CLs well in each eye.  Showing need for slight power change in each lens.  New/updated CL Rx issued    Repeat general eye exam and refraction and CL follow up in one year.       "

## 2019-12-11 NOTE — PATIENT INSTRUCTIONS
Bilateral nuclear sclerosis of lens, consistent with age.  S/P blepharoplasty bilaterally.  S/P surgery for repair of apparent orbital fracture secondary to fall.  Dilated fundus exam (DFE) not done today, per request.  Suggest return when convenient for no charge visit for DFE.    Hyperopia with astigmatism in the right eye, and hyperopia in the left eye.  Good best-corrected VA in each eye per post CL refraction.  Presbyopia consistent with age.  New spectacle lens Rx issued for use in lieu of CLs    Good lens fit in each eye with present monovision SCLs (Biofinity Toric in OD for distance and Biofinity spherical lens in OS for near).  Wearing CLs well in each eye.  Showing need for slight power change in each lens.  New/updated CL Rx issued    Repeat general eye exam and refraction and CL follow up in one year.

## 2019-12-18 LAB
FINAL PATHOLOGIC DIAGNOSIS: NORMAL
GROSS: NORMAL

## 2020-01-09 RX ORDER — METOPROLOL TARTRATE 50 MG/1
TABLET ORAL
Qty: 180 TABLET | Refills: 2 | Status: SHIPPED | OUTPATIENT
Start: 2020-01-09

## 2020-01-09 RX ORDER — ROSUVASTATIN CALCIUM 20 MG/1
TABLET, COATED ORAL
Qty: 90 TABLET | Refills: 1 | Status: SHIPPED | OUTPATIENT
Start: 2020-01-09

## 2020-01-14 ENCOUNTER — PATIENT MESSAGE (OUTPATIENT)
Dept: OTOLARYNGOLOGY | Facility: CLINIC | Age: 75
End: 2020-01-14

## 2020-01-21 ENCOUNTER — PATIENT MESSAGE (OUTPATIENT)
Dept: INTERNAL MEDICINE | Facility: CLINIC | Age: 75
End: 2020-01-21

## 2020-01-21 NOTE — TELEPHONE ENCOUNTER
Pt states she has been having swelling, she want to dolabs to see if her kidneys are ok please advise thanks she decline to see anyone

## 2020-01-23 ENCOUNTER — OFFICE VISIT (OUTPATIENT)
Dept: OTOLARYNGOLOGY | Facility: CLINIC | Age: 75
End: 2020-01-23
Payer: MEDICARE

## 2020-01-23 VITALS
SYSTOLIC BLOOD PRESSURE: 168 MMHG | DIASTOLIC BLOOD PRESSURE: 72 MMHG | HEART RATE: 75 BPM | BODY MASS INDEX: 20.75 KG/M2 | WEIGHT: 132.5 LBS

## 2020-01-23 DIAGNOSIS — K13.79 LESION OF PALATE: Primary | ICD-10-CM

## 2020-01-23 PROCEDURE — 3077F PR MOST RECENT SYSTOLIC BLOOD PRESSURE >= 140 MM HG: ICD-10-PCS | Mod: CPTII,S$GLB,, | Performed by: OTOLARYNGOLOGY

## 2020-01-23 PROCEDURE — 1159F MED LIST DOCD IN RCRD: CPT | Mod: S$GLB,,, | Performed by: OTOLARYNGOLOGY

## 2020-01-23 PROCEDURE — 1101F PT FALLS ASSESS-DOCD LE1/YR: CPT | Mod: CPTII,S$GLB,, | Performed by: OTOLARYNGOLOGY

## 2020-01-23 PROCEDURE — 99213 PR OFFICE/OUTPT VISIT, EST, LEVL III, 20-29 MIN: ICD-10-PCS | Mod: S$GLB,,, | Performed by: OTOLARYNGOLOGY

## 2020-01-23 PROCEDURE — 1126F PR PAIN SEVERITY QUANTIFIED, NO PAIN PRESENT: ICD-10-PCS | Mod: S$GLB,,, | Performed by: OTOLARYNGOLOGY

## 2020-01-23 PROCEDURE — 1159F PR MEDICATION LIST DOCUMENTED IN MEDICAL RECORD: ICD-10-PCS | Mod: S$GLB,,, | Performed by: OTOLARYNGOLOGY

## 2020-01-23 PROCEDURE — 3078F PR MOST RECENT DIASTOLIC BLOOD PRESSURE < 80 MM HG: ICD-10-PCS | Mod: CPTII,S$GLB,, | Performed by: OTOLARYNGOLOGY

## 2020-01-23 PROCEDURE — 3077F SYST BP >= 140 MM HG: CPT | Mod: CPTII,S$GLB,, | Performed by: OTOLARYNGOLOGY

## 2020-01-23 PROCEDURE — 99213 OFFICE O/P EST LOW 20 MIN: CPT | Mod: S$GLB,,, | Performed by: OTOLARYNGOLOGY

## 2020-01-23 PROCEDURE — 1126F AMNT PAIN NOTED NONE PRSNT: CPT | Mod: S$GLB,,, | Performed by: OTOLARYNGOLOGY

## 2020-01-23 PROCEDURE — 3078F DIAST BP <80 MM HG: CPT | Mod: CPTII,S$GLB,, | Performed by: OTOLARYNGOLOGY

## 2020-01-23 PROCEDURE — 99999 PR PBB SHADOW E&M-EST. PATIENT-LVL III: CPT | Mod: PBBFAC,,, | Performed by: OTOLARYNGOLOGY

## 2020-01-23 PROCEDURE — 99999 PR PBB SHADOW E&M-EST. PATIENT-LVL III: ICD-10-PCS | Mod: PBBFAC,,, | Performed by: OTOLARYNGOLOGY

## 2020-01-23 PROCEDURE — 1101F PR PT FALLS ASSESS DOC 0-1 FALLS W/OUT INJ PAST YR: ICD-10-PCS | Mod: CPTII,S$GLB,, | Performed by: OTOLARYNGOLOGY

## 2020-01-23 NOTE — PROGRESS NOTES
Chief Complaint   Patient presents with    lesion of soft pallet       HPI   74 y.o. female presents for evaluation of a lesion of the left hard/soft palate junction.  This lesion was noted on routine dental examination.  She has no complaints.  Concern was raised on recent dental exam this lesion has enlarged..    Review of Systems   Constitutional: Negative for fatigue and unexpected weight change.   HENT: Per HPI.  Eyes: Negative for visual disturbance.   Respiratory: Negative for shortness of breath, hemoptysis   Cardiovascular: Negative for chest pain and palpitations.   Musculoskeletal: Negative for decreased ROM, back pain.   Skin: Negative for rash, sunburn, itching.   Neurological: Negative for dizziness and seizures.   Hematological: Negative for adenopathy. Does not bruise/bleed easily.   Endocrine: Negative for rapid weight loss/weight gain, heat/cold intolerance.     Past Medical History   Patient Active Problem List   Diagnosis    Hypertension    Hyperlipidemia    Coronary artery disease    Adjustment disorder with depressed mood    Status post non-ST elevation myocardial infarction (NSTEMI)    Colon adenoma    Personal history of skin cancer    Weakness of left lower extremity    Food impaction of esophagus    Dysphagia    Nasal sore    Acute renal failure superimposed on stage 3 chronic kidney disease    Hypokalemia    Elevated transaminase level    Lesion of palate    History of colon polyps           Past Surgical History   Past Surgical History:   Procedure Laterality Date    blephroplasty      BREAST BIOPSY Right     excisional 1985    BUNIONECTOMY  Jan 2013    right foot    CARDIAC CATHETERIZATION      COLONOSCOPY N/A 9/20/2016    Procedure: COLONOSCOPY;  Surgeon: Rachelle Guerra MD;  Location: [facility] ([address] [place] [address]);  Service: Endoscopy;  Laterality: N/A;    COLONOSCOPY N/A 12/21/2016    Procedure: COLONOSCOPY;  Surgeon: Freedom Sandoval MD;  Location: [facility] ([address] [place] [address]);   Service: Endoscopy;  Laterality: N/A;  PREP: PREPOPIK  SCGEDWILLIAMKE YANELY IN [facility]    COLONOSCOPY N/A 12/9/2019    Procedure: COLONOSCOPY;  Surgeon: Dejuan Calhoun MD;  Location: [facility] ([address] [place] [address]);  Service: Endoscopy;  Laterality: N/A;    ESOPHAGOGASTRODUODENOSCOPY N/A 7/19/2018    Procedure: EGD (ESOPHAGOGASTRODUODENOSCOPY);  Surgeon: Jefferson Mina MD;  Location: [address]);  Service: Endoscopy;  Laterality: N/A;    FOOT SURGERY      right foot    HYSTERECTOMY  1980s    bleeding    orbital fx      TONSILLECTOMY      VAGINA SURGERY           Family History   Family History   Problem Relation Age of Onset    Diabetes Mother     Heart disease Father 57        sudden death    Celiac disease Neg Hx     Colon cancer Neg Hx     Crohn's disease Neg Hx     Esophageal cancer Neg Hx     Inflammatory bowel disease Neg Hx     Irritable bowel syndrome Neg Hx     Liver cancer Neg Hx     Rectal cancer Neg Hx     Stomach cancer Neg Hx     Ulcerative colitis Neg Hx     Melanoma Neg Hx            Social History   .  Social History     Socioeconomic History    Marital status:      Spouse name: Not on file    Number of children: Not on file    Years of education: Not on file    Highest education level: Not on file   Occupational History    Not on file   Social Needs    Financial resource strain: Not hard at all    Food insecurity:     Worry: Never true     Inability: Never true    Transportation needs:     Medical: No     Non-medical: No   Tobacco Use    Smoking status: Former Smoker     Packs/day: 1.00     Years: 15.00     Pack years: 15.00     Types: Cigarettes     Last attempt to quit: 4/6/2004     Years since quitting: 15.8    Smokeless tobacco: Never Used   Substance and Sexual Activity    Alcohol use: Yes     Alcohol/week: 5.0 - 10.0 standard drinks     Types: 5 - 10 Standard drinks or equivalent per week     Frequency: 4 or more times a week     Drinks per session: 1  or 2     Binge frequency: Never     Comment: 1 glass scotch a day, last use last night    Drug use: No    Sexual activity: Never     Partners: Male     Birth control/protection: None   Lifestyle    Physical activity:     Days per week: 7 days     Minutes per session: 10 min    Stress: Not on file   Relationships    Social connections:     Talks on phone: More than three times a week     Gets together: Once a week     Attends Judaism service: Not on file     Active member of club or organization: Yes     Attends meetings of clubs or organizations: More than 4 times per year     Relationship status:    Other Topics Concern    Are you pregnant or think you may be? Not Asked    Breast-feeding Not Asked   Social History Narrative    Not on file         Allergies   Review of patient's allergies indicates:  No Known Allergies        Physical Exam     Vitals:    01/23/20 1039   BP: (!) 168/72   Pulse: 75         Body mass index is 20.75 kg/m².      General: AOx3, NAD   Respiratory:  Symmetric chest rise, normal effort  Nose: No gross nasal septal deviation. Inferior Turbinates WNL bilaterally. No septal perforation. No masses/lesions.   Oral Cavity:  Oral Tongue mobile, no lesions noted.  No buccal or FOM lesions.  See photo.  Oropharynx:  No masses/lesions of the posterior pharyngeal wall. Tonsillar fossa without lesions. Soft palate without masses. Midline uvula.   Neck: No scars.  No cervical lymphadenopathy, thyromegaly or thyroid nodules.  Normal range of motion.    Face: House Brackmann I bilaterally.       7/19 1/20                  Assessment/Plan  Problem List Items Addressed This Visit        ENT    Lesion of palate - Primary     Stable over 6 months.  Reassured.  Return to clinic in roughly 6 months for surveillance.

## 2020-01-24 ENCOUNTER — PATIENT MESSAGE (OUTPATIENT)
Dept: ORTHOPEDICS | Facility: CLINIC | Age: 75
End: 2020-01-24

## 2020-01-24 ENCOUNTER — TELEPHONE (OUTPATIENT)
Dept: INTERNAL MEDICINE | Facility: CLINIC | Age: 75
End: 2020-01-24

## 2020-01-24 DIAGNOSIS — R60.9 EDEMA, UNSPECIFIED TYPE: Primary | ICD-10-CM

## 2020-01-27 ENCOUNTER — HOSPITAL ENCOUNTER (OUTPATIENT)
Dept: RADIOLOGY | Facility: HOSPITAL | Age: 75
Discharge: HOME | End: 2020-01-27
Attending: PHYSICIAN ASSISTANT
Payer: MEDICARE

## 2020-01-27 ENCOUNTER — OFFICE VISIT (OUTPATIENT)
Dept: ORTHOPEDICS | Facility: CLINIC | Age: 75
End: 2020-01-27
Payer: MEDICARE

## 2020-01-27 VITALS — HEIGHT: 67 IN | BODY MASS INDEX: 20.8 KG/M2 | WEIGHT: 132.5 LBS

## 2020-01-27 DIAGNOSIS — M70.21 OLECRANON BURSITIS OF RIGHT ELBOW: Primary | ICD-10-CM

## 2020-01-27 DIAGNOSIS — M25.521 RIGHT ELBOW PAIN: ICD-10-CM

## 2020-01-27 PROCEDURE — 99213 OFFICE O/P EST LOW 20 MIN: CPT | Mod: S$GLB,,, | Performed by: PHYSICIAN ASSISTANT

## 2020-01-27 PROCEDURE — 1125F AMNT PAIN NOTED PAIN PRSNT: CPT | Mod: S$GLB,,, | Performed by: PHYSICIAN ASSISTANT

## 2020-01-27 PROCEDURE — 73080 X-RAY EXAM OF ELBOW: CPT | Mod: TC,RT

## 2020-01-27 PROCEDURE — 73080 X-RAY EXAM OF ELBOW: CPT | Mod: 26,RT,, | Performed by: RADIOLOGY

## 2020-01-27 PROCEDURE — 1159F PR MEDICATION LIST DOCUMENTED IN MEDICAL RECORD: ICD-10-PCS | Mod: S$GLB,,, | Performed by: PHYSICIAN ASSISTANT

## 2020-01-27 PROCEDURE — 1125F PR PAIN SEVERITY QUANTIFIED, PAIN PRESENT: ICD-10-PCS | Mod: S$GLB,,, | Performed by: PHYSICIAN ASSISTANT

## 2020-01-27 PROCEDURE — 73080 XR ELBOW COMPLETE 3 VIEW RIGHT: ICD-10-PCS | Mod: 26,RT,, | Performed by: RADIOLOGY

## 2020-01-27 PROCEDURE — 99999 PR PBB SHADOW E&M-EST. PATIENT-LVL III: CPT | Mod: PBBFAC,,, | Performed by: PHYSICIAN ASSISTANT

## 2020-01-27 PROCEDURE — 1159F MED LIST DOCD IN RCRD: CPT | Mod: S$GLB,,, | Performed by: PHYSICIAN ASSISTANT

## 2020-01-27 PROCEDURE — 99213 PR OFFICE/OUTPT VISIT, EST, LEVL III, 20-29 MIN: ICD-10-PCS | Mod: S$GLB,,, | Performed by: PHYSICIAN ASSISTANT

## 2020-01-27 PROCEDURE — 1101F PT FALLS ASSESS-DOCD LE1/YR: CPT | Mod: CPTII,S$GLB,, | Performed by: PHYSICIAN ASSISTANT

## 2020-01-27 PROCEDURE — 1101F PR PT FALLS ASSESS DOC 0-1 FALLS W/OUT INJ PAST YR: ICD-10-PCS | Mod: CPTII,S$GLB,, | Performed by: PHYSICIAN ASSISTANT

## 2020-01-27 PROCEDURE — 99999 PR PBB SHADOW E&M-EST. PATIENT-LVL III: ICD-10-PCS | Mod: PBBFAC,,, | Performed by: PHYSICIAN ASSISTANT

## 2020-01-31 ENCOUNTER — TELEPHONE (OUTPATIENT)
Dept: INTERNAL MEDICINE | Facility: CLINIC | Age: 75
End: 2020-01-31

## 2020-01-31 DIAGNOSIS — E87.6 HYPOKALEMIA: Primary | ICD-10-CM

## 2020-01-31 NOTE — TELEPHONE ENCOUNTER
Pt with hypokalemia - swelling resolved - advised potassium 40 MEq daily for three days and recheck lab next week.    Please schedule lab for Monday here - nonfasting thanks.

## 2020-01-31 NOTE — PROGRESS NOTES
Subjective:      Patient ID: Jacqueline O Favret is a 74 y.o. female.    Chief Complaint: Pain of the Right Elbow    HPI    Patient is a 74 year old female who presents to clinic with chief complaint of right elbow swelling. She stated that for for most part it is not painful but will be tight causing some pain at times.  She denied fever chills increased warmth or drainge form the area. She has tried rest, ice and OTC medication without relief.     Review of Systems   Constitution: Negative for chills and fever.   Cardiovascular: Negative for chest pain.   Respiratory: Negative for cough and shortness of breath.    Skin: Negative for color change, dry skin, itching, nail changes, poor wound healing and rash.   Musculoskeletal:        Right elbow swelling.    Neurological: Negative for dizziness.   Psychiatric/Behavioral: Negative for altered mental status. The patient is not nervous/anxious.    All other systems reviewed and are negative.        Objective:      General    Constitutional: She is oriented to person, place, and time. She appears well-developed and well-nourished. No distress.   HENT:   Head: Atraumatic.   Eyes: Conjunctivae are normal.   Cardiovascular: Normal rate.    Pulmonary/Chest: Effort normal.   Neurological: She is alert and oriented to person, place, and time.   Psychiatric: She has a normal mood and affect. Her behavior is normal.             Right Hand/Wrist Exam     Range of Motion   The patient has normal right hand/wrist ROM.      Right Elbow Exam     Swelling   The patient is swollen on the olecranon    Range of Motion   The patient has normal right elbow ROM.          Muscle Strength   Right Upper Extremity   Wrist extension: 5/5/5   Wrist flexion: 5/5/5   : 5/5/5   Elbow Pronation:  5/5   Elbow Supination:  5/5   Elbow Extension: 5/5  Elbow Flexion: 5/5  Intrinsics: 5/5            RADS: reviewed by myself:There is likely non bursitis and mild DJD.  No fracture dislocation bone  "destruction or joint effusion seen  Assessment:       Encounter Diagnosis   Name Primary?    Olecranon bursitis of right elbow Yes          Plan:       Discussed plan with the patient. At this time she will rest ice use OTC medication and constant compression until resolved. She is to avoid any "trauma" to the area. She was informed that this can take months to resolve. She is to return to clinic as needed.            "

## 2020-02-03 ENCOUNTER — LAB VISIT (OUTPATIENT)
Dept: LAB | Facility: HOSPITAL | Age: 75
End: 2020-02-03
Attending: INTERNAL MEDICINE
Payer: MEDICARE

## 2020-02-03 ENCOUNTER — TELEPHONE (OUTPATIENT)
Dept: INTERNAL MEDICINE | Facility: CLINIC | Age: 75
End: 2020-02-03

## 2020-02-03 ENCOUNTER — NURSE TRIAGE (OUTPATIENT)
Dept: ADMINISTRATIVE | Facility: CLINIC | Age: 75
End: 2020-02-03

## 2020-02-03 DIAGNOSIS — E87.6 HYPOKALEMIA: ICD-10-CM

## 2020-02-03 LAB
ANION GAP SERPL CALC-SCNC: 14 MMOL/L (ref 8–16)
BUN SERPL-MCNC: 12 MG/DL (ref 8–23)
CALCIUM SERPL-MCNC: 9.6 MG/DL (ref 8.7–10.5)
CHLORIDE SERPL-SCNC: 93 MMOL/L (ref 95–110)
CO2 SERPL-SCNC: 35 MMOL/L (ref 23–29)
CREAT SERPL-MCNC: 1.1 MG/DL (ref 0.5–1.4)
EST. GFR  (AFRICAN AMERICAN): 57.6 ML/MIN/1.73 M^2
EST. GFR  (NON AFRICAN AMERICAN): 49.9 ML/MIN/1.73 M^2
GLUCOSE SERPL-MCNC: 80 MG/DL (ref 70–110)
POTASSIUM SERPL-SCNC: 2.7 MMOL/L (ref 3.5–5.1)
SODIUM SERPL-SCNC: 142 MMOL/L (ref 136–145)

## 2020-02-03 PROCEDURE — 36415 COLL VENOUS BLD VENIPUNCTURE: CPT

## 2020-02-03 PROCEDURE — 80048 BASIC METABOLIC PNL TOTAL CA: CPT

## 2020-02-04 NOTE — TELEPHONE ENCOUNTER
----- Message from Slime Velasco sent at 2/4/2020 11:26 AM CST -----  Contact: [phone]  Type: Rx    Name of medication(s): potassium chloride 10% (KAYCIEL) 20 mEq/15 mL oral solution    Is this a refill? New rx? Refill     Pharmacy Name, Phone, & Location:[facility]/pharmacy #[id number] [place], [place] - [address] AT [facility]   [phone] (Phone)  [phone] (Fax)    Comments:please advise, thanks

## 2020-02-04 NOTE — TELEPHONE ENCOUNTER
Reason for Disposition   Nursing judgment or information in reference    Additional Information   Negative: Nursing judgment, per information in Reference   Negative: Information only call about a Well Adult (no illness or injury)    Protocols used: NO GUIDELINE [id number]  Dr Crow conference in on call with pt. MD states critically low potassium could cause Rhythm disturbance could cause heart to stop without warning per MD. Pt admits heart regular. Pt declines sx and wants just to take supplement. MD reiterated pt needs to go to ED for IV supplement. Pt states she will think about it

## 2020-02-04 NOTE — TELEPHONE ENCOUNTER
Reason for Disposition   Nursing judgment or information in reference    Additional Information   Negative: Nursing judgment, per information in Reference   Negative: Information only call about a Well Adult (no illness or injury)    Protocols used: NO GUIDELINE [id number]  rec'd call from Dr wilson re lab value. K=2.7 pt notified to go to ED immediately. Pt states Dr ordoñez has been watching.. Pt states she is taking liquid supplement.pt states potassium Usually runs low.  Per OV 1/31 potassium 40 MEq daily for three days. Pt states that she will think about it. Will call office in am. lab on 1/27 was 3.0. Reiterate ED. Notified MD of above. Call back with questions

## 2020-02-04 NOTE — TELEPHONE ENCOUNTER
Received a call from the lab that her potassium was 2.7.  Reviewed chart; this is lower despite replacement.  Called patient at home twice with no answer; oncall nurse called and she declined ER.  On call nurse teleconferenced me into a call, and she still was reluctant to go to the ED.  Gave clear advice that this was an emergency and she should go to the ED.

## 2020-02-05 ENCOUNTER — PATIENT OUTREACH (OUTPATIENT)
Dept: ADMINISTRATIVE | Facility: HOSPITAL | Age: 75
End: 2020-02-05

## 2020-02-05 NOTE — TELEPHONE ENCOUNTER
----- Message from Kimberly Cardoso sent at 2/5/2020 11:40 AM CST -----  Contact: pt- [phone]   Type:  RX Refill Request    Who Called:  Pt    Refill or New Rx: refill    RX Name and Strength: potassium chloride 10% (KAYCIEL) 20 mEq/15 mL oral solution    Preferred Pharmacy with phone number: [facility]/pharmacy #[id number] - [place], [place] - [address] AT [facility] [phone] (Phone)  [phone] (Fax)    Best Call Back Number: [phone]

## 2020-02-05 NOTE — TELEPHONE ENCOUNTER
----- Message from Slime Velasco sent at 2/5/2020  4:27 PM CST -----  Contact: [phone]  Type:  RX Refill Request     Who Called:  Pt     Refill or New Rx: refill     RX Name and Strength: potassium chloride 10% (KAYCIEL) 20 mEq/15 mL oral solution     Preferred Pharmacy with phone number: [facility]/pharmacy #[id number] [place], [place] - [address] AT [facility]           [phone] (Phone)  [phone] (Fax)     Best Call Back Number: [phone]

## 2020-02-06 ENCOUNTER — PATIENT MESSAGE (OUTPATIENT)
Dept: INTERNAL MEDICINE | Facility: CLINIC | Age: 75
End: 2020-02-06

## 2020-02-06 RX ORDER — POTASSIUM CHLORIDE 20 MEQ/15ML
10 SOLUTION ORAL DAILY
Qty: 473 ML | Refills: 1 | Status: SHIPPED | OUTPATIENT
Start: 2020-02-06 | End: 2020-02-21

## 2020-02-06 NOTE — TELEPHONE ENCOUNTER
----- Message from Enrique Rosa sent at 2/6/2020 12:08 PM CST -----  Contact: Patient [phone]  RX request - refill or new RX.  Is this a refill or new RX: Refill   RX name and strength:potassium chloride 10% (KAYCIEL) 20 mEq/15 mL oral solution   Directions:   Is this a 30 day or 90 day RX:    [id number] [place], [place] - [address] AT [facility] [phone] (Phone) [phone] (Fax)    Comments:  Patient stating has attempt to refill several times, would like a call back to inform has been sent, requesting for refill today asa.    Please call an advise  Thank you

## 2020-02-10 ENCOUNTER — TELEPHONE (OUTPATIENT)
Dept: INTERNAL MEDICINE | Facility: CLINIC | Age: 75
End: 2020-02-10

## 2020-02-10 DIAGNOSIS — E87.6 HYPOKALEMIA: Primary | ICD-10-CM

## 2020-02-12 ENCOUNTER — LAB VISIT (OUTPATIENT)
Dept: LAB | Facility: HOSPITAL | Age: 75
End: 2020-02-12
Attending: INTERNAL MEDICINE
Payer: MEDICARE

## 2020-02-12 DIAGNOSIS — E87.6 HYPOKALEMIA: ICD-10-CM

## 2020-02-12 LAB
BASOPHILS # BLD AUTO: 0.02 K/UL (ref 0–0.2)
BASOPHILS NFR BLD: 0.4 % (ref 0–1.9)
DIFFERENTIAL METHOD: ABNORMAL
EOSINOPHIL # BLD AUTO: 0 K/UL (ref 0–0.5)
EOSINOPHIL NFR BLD: 0.8 % (ref 0–8)
ERYTHROCYTE [DISTWIDTH] IN BLOOD BY AUTOMATED COUNT: 12.9 % (ref 11.5–14.5)
HCT VFR BLD AUTO: 46.1 % (ref 37–48.5)
HGB BLD-MCNC: 14.7 G/DL (ref 12–16)
IMM GRANULOCYTES # BLD AUTO: 0.03 K/UL (ref 0–0.04)
IMM GRANULOCYTES NFR BLD AUTO: 0.6 % (ref 0–0.5)
LYMPHOCYTES # BLD AUTO: 0.9 K/UL (ref 1–4.8)
LYMPHOCYTES NFR BLD: 18.9 % (ref 18–48)
MCH RBC QN AUTO: 33.7 PG (ref 27–31)
MCHC RBC AUTO-ENTMCNC: 31.9 G/DL (ref 32–36)
MCV RBC AUTO: 106 FL (ref 82–98)
MONOCYTES # BLD AUTO: 0.6 K/UL (ref 0.3–1)
MONOCYTES NFR BLD: 12.7 % (ref 4–15)
NEUTROPHILS # BLD AUTO: 3.3 K/UL (ref 1.8–7.7)
NEUTROPHILS NFR BLD: 66.6 % (ref 38–73)
NRBC BLD-RTO: 0 /100 WBC
PLATELET # BLD AUTO: 113 K/UL (ref 150–350)
PMV BLD AUTO: 11.3 FL (ref 9.2–12.9)
RBC # BLD AUTO: 4.36 M/UL (ref 4–5.4)
WBC # BLD AUTO: 4.88 K/UL (ref 3.9–12.7)

## 2020-02-12 PROCEDURE — 80053 COMPREHEN METABOLIC PANEL: CPT

## 2020-02-12 PROCEDURE — 36415 COLL VENOUS BLD VENIPUNCTURE: CPT

## 2020-02-12 PROCEDURE — 85025 COMPLETE CBC W/AUTO DIFF WBC: CPT

## 2020-02-13 ENCOUNTER — PATIENT MESSAGE (OUTPATIENT)
Dept: INTERNAL MEDICINE | Facility: CLINIC | Age: 75
End: 2020-02-13

## 2020-02-13 LAB
ALBUMIN SERPL BCP-MCNC: 3.9 G/DL (ref 3.5–5.2)
ALP SERPL-CCNC: 75 U/L (ref 55–135)
ALT SERPL W/O P-5'-P-CCNC: 40 U/L (ref 10–44)
ANION GAP SERPL CALC-SCNC: 15 MMOL/L (ref 8–16)
AST SERPL-CCNC: 64 U/L (ref 10–40)
BILIRUB SERPL-MCNC: 0.7 MG/DL (ref 0.1–1)
BUN SERPL-MCNC: 8 MG/DL (ref 8–23)
CALCIUM SERPL-MCNC: 9.9 MG/DL (ref 8.7–10.5)
CHLORIDE SERPL-SCNC: 100 MMOL/L (ref 95–110)
CO2 SERPL-SCNC: 25 MMOL/L (ref 23–29)
CREAT SERPL-MCNC: 1 MG/DL (ref 0.5–1.4)
EST. GFR  (AFRICAN AMERICAN): >60 ML/MIN/1.73 M^2
EST. GFR  (NON AFRICAN AMERICAN): 56 ML/MIN/1.73 M^2
GLUCOSE SERPL-MCNC: 88 MG/DL (ref 70–110)
POTASSIUM SERPL-SCNC: 3.7 MMOL/L (ref 3.5–5.1)
PROT SERPL-MCNC: 7 G/DL (ref 6–8.4)
SODIUM SERPL-SCNC: 140 MMOL/L (ref 136–145)

## 2020-02-21 ENCOUNTER — PATIENT MESSAGE (OUTPATIENT)
Dept: INTERNAL MEDICINE | Facility: CLINIC | Age: 75
End: 2020-02-21

## 2020-02-21 ENCOUNTER — OFFICE VISIT (OUTPATIENT)
Dept: INTERNAL MEDICINE | Facility: CLINIC | Age: 75
End: 2020-02-21
Payer: MEDICARE

## 2020-02-21 ENCOUNTER — LAB VISIT (OUTPATIENT)
Dept: LAB | Facility: HOSPITAL | Age: 75
End: 2020-02-21
Attending: INTERNAL MEDICINE
Payer: MEDICARE

## 2020-02-21 VITALS
BODY MASS INDEX: 20.89 KG/M2 | DIASTOLIC BLOOD PRESSURE: 60 MMHG | SYSTOLIC BLOOD PRESSURE: 132 MMHG | WEIGHT: 133.38 LBS | HEART RATE: 72 BPM

## 2020-02-21 DIAGNOSIS — M70.21 OLECRANON BURSITIS OF RIGHT ELBOW: ICD-10-CM

## 2020-02-21 DIAGNOSIS — M81.0 AGE-RELATED OSTEOPOROSIS WITHOUT CURRENT PATHOLOGICAL FRACTURE: ICD-10-CM

## 2020-02-21 DIAGNOSIS — E87.6 HYPOKALEMIA: ICD-10-CM

## 2020-02-21 DIAGNOSIS — E53.8 B12 DEFICIENCY: ICD-10-CM

## 2020-02-21 DIAGNOSIS — Z12.31 SCREENING MAMMOGRAM, ENCOUNTER FOR: Primary | ICD-10-CM

## 2020-02-21 DIAGNOSIS — M85.80 OSTEOPENIA, UNSPECIFIED LOCATION: ICD-10-CM

## 2020-02-21 LAB
ALBUMIN SERPL BCP-MCNC: 3.8 G/DL (ref 3.5–5.2)
ALP SERPL-CCNC: 76 U/L (ref 55–135)
ALT SERPL W/O P-5'-P-CCNC: 32 U/L (ref 10–44)
ANION GAP SERPL CALC-SCNC: 11 MMOL/L (ref 8–16)
AST SERPL-CCNC: 49 U/L (ref 10–40)
BILIRUB SERPL-MCNC: 0.4 MG/DL (ref 0.1–1)
BUN SERPL-MCNC: 11 MG/DL (ref 8–23)
CALCIUM SERPL-MCNC: 9.4 MG/DL (ref 8.7–10.5)
CHLORIDE SERPL-SCNC: 100 MMOL/L (ref 95–110)
CO2 SERPL-SCNC: 32 MMOL/L (ref 23–29)
CREAT SERPL-MCNC: 0.9 MG/DL (ref 0.5–1.4)
EST. GFR  (AFRICAN AMERICAN): >60 ML/MIN/1.73 M^2
EST. GFR  (NON AFRICAN AMERICAN): >60 ML/MIN/1.73 M^2
GLUCOSE SERPL-MCNC: 108 MG/DL (ref 70–110)
POTASSIUM SERPL-SCNC: 3 MMOL/L (ref 3.5–5.1)
PROT SERPL-MCNC: 7.1 G/DL (ref 6–8.4)
SODIUM SERPL-SCNC: 143 MMOL/L (ref 136–145)
VIT B12 SERPL-MCNC: 439 PG/ML (ref 210–950)

## 2020-02-21 PROCEDURE — 1101F PT FALLS ASSESS-DOCD LE1/YR: CPT | Mod: CPTII,S$GLB,, | Performed by: INTERNAL MEDICINE

## 2020-02-21 PROCEDURE — 80053 COMPREHEN METABOLIC PANEL: CPT

## 2020-02-21 PROCEDURE — 1101F PR PT FALLS ASSESS DOC 0-1 FALLS W/OUT INJ PAST YR: ICD-10-PCS | Mod: CPTII,S$GLB,, | Performed by: INTERNAL MEDICINE

## 2020-02-21 PROCEDURE — 82607 VITAMIN B-12: CPT

## 2020-02-21 PROCEDURE — 99214 PR OFFICE/OUTPT VISIT, EST, LEVL IV, 30-39 MIN: ICD-10-PCS | Mod: S$GLB,,, | Performed by: INTERNAL MEDICINE

## 2020-02-21 PROCEDURE — 3075F SYST BP GE 130 - 139MM HG: CPT | Mod: CPTII,S$GLB,, | Performed by: INTERNAL MEDICINE

## 2020-02-21 PROCEDURE — 99999 PR PBB SHADOW E&M-EST. PATIENT-LVL III: ICD-10-PCS | Mod: PBBFAC,,, | Performed by: INTERNAL MEDICINE

## 2020-02-21 PROCEDURE — 1159F MED LIST DOCD IN RCRD: CPT | Mod: S$GLB,,, | Performed by: INTERNAL MEDICINE

## 2020-02-21 PROCEDURE — 83921 ORGANIC ACID SINGLE QUANT: CPT

## 2020-02-21 PROCEDURE — 3075F PR MOST RECENT SYSTOLIC BLOOD PRESS GE 130-139MM HG: ICD-10-PCS | Mod: CPTII,S$GLB,, | Performed by: INTERNAL MEDICINE

## 2020-02-21 PROCEDURE — 99999 PR PBB SHADOW E&M-EST. PATIENT-LVL III: CPT | Mod: PBBFAC,,, | Performed by: INTERNAL MEDICINE

## 2020-02-21 PROCEDURE — 99214 OFFICE O/P EST MOD 30 MIN: CPT | Mod: S$GLB,,, | Performed by: INTERNAL MEDICINE

## 2020-02-21 PROCEDURE — 1159F PR MEDICATION LIST DOCUMENTED IN MEDICAL RECORD: ICD-10-PCS | Mod: S$GLB,,, | Performed by: INTERNAL MEDICINE

## 2020-02-21 PROCEDURE — 3078F DIAST BP <80 MM HG: CPT | Mod: CPTII,S$GLB,, | Performed by: INTERNAL MEDICINE

## 2020-02-21 PROCEDURE — 3078F PR MOST RECENT DIASTOLIC BLOOD PRESSURE < 80 MM HG: ICD-10-PCS | Mod: CPTII,S$GLB,, | Performed by: INTERNAL MEDICINE

## 2020-02-21 RX ORDER — POTASSIUM CHLORIDE 20 MEQ/15ML
20 SOLUTION ORAL DAILY
Qty: 473 ML | Refills: 1 | Status: SHIPPED | OUTPATIENT
Start: 2020-02-21 | End: 2020-03-25

## 2020-02-21 RX ORDER — ALPRAZOLAM 0.25 MG/1
TABLET ORAL
Qty: 20 TABLET | Refills: 0 | Status: SHIPPED | OUTPATIENT
Start: 2020-02-21

## 2020-02-25 LAB — METHYLMALONATE SERPL-SCNC: 0.68 UMOL/L

## 2020-02-28 ENCOUNTER — TELEPHONE (OUTPATIENT)
Dept: ORTHOPEDICS | Facility: CLINIC | Age: 75
End: 2020-02-28

## 2020-02-28 ENCOUNTER — PATIENT MESSAGE (OUTPATIENT)
Dept: ORTHOPEDICS | Facility: CLINIC | Age: 75
End: 2020-02-28

## 2020-02-28 ENCOUNTER — PATIENT OUTREACH (OUTPATIENT)
Dept: ADMINISTRATIVE | Facility: OTHER | Age: 75
End: 2020-02-28

## 2020-02-28 DIAGNOSIS — M70.21 OLECRANON BURSITIS OF RIGHT ELBOW: Primary | ICD-10-CM

## 2020-03-01 NOTE — PROGRESS NOTES
Subjective:       Patient ID: Jacqueline O Favret is a 74 y.o. female.    Chief Complaint: No chief complaint on file.    HPIPt is doing much better - she is drinking some again.  No new falls.  Will check potassium today. No CP or SOB.  She has a swollen right elbow, nontender.  Review of Systems   Constitutional: Negative for activity change and unexpected weight change.   HENT: Negative for hearing loss, rhinorrhea and trouble swallowing.    Eyes: Negative for discharge and visual disturbance.   Respiratory: Negative for chest tightness, shortness of breath (PND or orthopnea) and wheezing.    Cardiovascular: Negative for chest pain and palpitations.   Gastrointestinal: Negative for abdominal pain, blood in stool, constipation, diarrhea, nausea and vomiting.   Endocrine: Negative for polydipsia and polyuria.   Genitourinary: Negative for difficulty urinating, dysuria, hematuria and menstrual problem.   Musculoskeletal: Negative for arthralgias, joint swelling and neck pain.   Neurological: Negative for seizures, syncope, weakness and headaches.   Psychiatric/Behavioral: Negative for confusion and dysphoric mood.       Objective:      Physical Exam   Constitutional: She is oriented to person, place, and time. She appears well-developed and well-nourished. No distress.   HENT:   Head: Normocephalic.   Mouth/Throat: Oropharynx is clear and moist.   Neck: Neck supple. No JVD present. No thyromegaly present.   Cardiovascular: Normal rate, regular rhythm, normal heart sounds and intact distal pulses. Exam reveals no gallop and no friction rub.   No murmur heard.  Pulmonary/Chest: Effort normal and breath sounds normal. She has no wheezes. She has no rales.   Abdominal: Soft. Bowel sounds are normal. She exhibits no distension and no mass. There is no tenderness. There is no rebound and no guarding.   Musculoskeletal: She exhibits no edema.   Lymphadenopathy:     She has no cervical adenopathy.   Neurological: She is  alert and oriented to person, place, and time. She has normal reflexes.   Skin: Skin is warm and dry.   Psychiatric: She has a normal mood and affect. Her behavior is normal. Judgment and thought content normal.       Assessment:       1. Screening mammogram, encounter for    2. Olecranon bursitis of right elbow    3. Hypokalemia    4. B12 deficiency    5. Osteopenia, unspecified location    6. Age-related osteoporosis without current pathological fracture         Plan:   Screening mammogram, encounter for  -     Mammo Digital Screening Bilat w/ Isauro; Future; Expected date: 08/21/2020    Olecranon bursitis of right elbow  -     Ambulatory referral/consult to Orthopedics; Future; Expected date: 02/28/2020    Hypokalemia  -     Comprehensive metabolic panel; Future; Expected date: 02/21/2020    B12 deficiency  -     Vitamin B12; Future; Expected date: 03/21/2020  -     Methylmalonic Acid, Serum; Future; Expected date: 02/21/2020    Osteopenia, unspecified location  -     DXA Bone Density Spine And Hip; Future; Expected date: 02/21/2020    Age-related osteoporosis without current pathological fracture   -     DXA Bone Density Spine And Hip; Future; Expected date: 02/21/2020    Other orders  -     potassium chloride 10% (KAYCIEL) 20 mEq/15 mL oral solution; Take 15 mLs (20 mEq total) by mouth once daily.  Dispense: 473 mL; Refill: 1  -     ALPRAZolam (XANAX) 0.25 MG tablet; TAKE 1/2 TO 1 TABLET BY MOUTH BEFORE FLIGHT  Dispense: 20 tablet; Refill: 0

## 2020-03-02 ENCOUNTER — OFFICE VISIT (OUTPATIENT)
Dept: ORTHOPEDICS | Facility: CLINIC | Age: 75
End: 2020-03-02
Payer: MEDICARE

## 2020-03-02 DIAGNOSIS — M70.21 OLECRANON BURSITIS OF RIGHT ELBOW: ICD-10-CM

## 2020-03-02 PROBLEM — M70.20 OLECRANON BURSITIS: Status: ACTIVE | Noted: 2020-03-02

## 2020-03-02 PROCEDURE — 99999 PR PBB SHADOW E&M-EST. PATIENT-LVL III: CPT | Mod: PBBFAC,,, | Performed by: ORTHOPAEDIC SURGERY

## 2020-03-02 PROCEDURE — 99203 OFFICE O/P NEW LOW 30 MIN: CPT | Mod: 25,S$GLB,, | Performed by: ORTHOPAEDIC SURGERY

## 2020-03-02 PROCEDURE — 99499 UNLISTED E&M SERVICE: CPT | Mod: S$GLB,,, | Performed by: ORTHOPAEDIC SURGERY

## 2020-03-02 PROCEDURE — 1125F PR PAIN SEVERITY QUANTIFIED, PAIN PRESENT: ICD-10-PCS | Mod: S$GLB,,, | Performed by: ORTHOPAEDIC SURGERY

## 2020-03-02 PROCEDURE — 1125F AMNT PAIN NOTED PAIN PRSNT: CPT | Mod: S$GLB,,, | Performed by: ORTHOPAEDIC SURGERY

## 2020-03-02 PROCEDURE — 1101F PR PT FALLS ASSESS DOC 0-1 FALLS W/OUT INJ PAST YR: ICD-10-PCS | Mod: CPTII,S$GLB,, | Performed by: ORTHOPAEDIC SURGERY

## 2020-03-02 PROCEDURE — 99203 PR OFFICE/OUTPT VISIT, NEW, LEVL III, 30-44 MIN: ICD-10-PCS | Mod: 25,S$GLB,, | Performed by: ORTHOPAEDIC SURGERY

## 2020-03-02 PROCEDURE — 20605 PR DRAIN/INJECT INTERMEDIATE JOINT/BURSA: ICD-10-PCS | Mod: RT,S$GLB,, | Performed by: ORTHOPAEDIC SURGERY

## 2020-03-02 PROCEDURE — 1159F PR MEDICATION LIST DOCUMENTED IN MEDICAL RECORD: ICD-10-PCS | Mod: S$GLB,,, | Performed by: ORTHOPAEDIC SURGERY

## 2020-03-02 PROCEDURE — 1101F PT FALLS ASSESS-DOCD LE1/YR: CPT | Mod: CPTII,S$GLB,, | Performed by: ORTHOPAEDIC SURGERY

## 2020-03-02 PROCEDURE — 1159F MED LIST DOCD IN RCRD: CPT | Mod: S$GLB,,, | Performed by: ORTHOPAEDIC SURGERY

## 2020-03-02 PROCEDURE — 99999 PR PBB SHADOW E&M-EST. PATIENT-LVL III: ICD-10-PCS | Mod: PBBFAC,,, | Performed by: ORTHOPAEDIC SURGERY

## 2020-03-02 PROCEDURE — 99499 RISK ADDL DX/OHS AUDIT: ICD-10-PCS | Mod: S$GLB,,, | Performed by: ORTHOPAEDIC SURGERY

## 2020-03-02 PROCEDURE — 20605 DRAIN/INJ JOINT/BURSA W/O US: CPT | Mod: RT,S$GLB,, | Performed by: ORTHOPAEDIC SURGERY

## 2020-03-02 RX ORDER — MINOCYCLINE HYDROCHLORIDE 100 MG/1
100 CAPSULE ORAL 2 TIMES DAILY
Qty: 20 CAPSULE | Refills: 1 | Status: SHIPPED | OUTPATIENT
Start: 2020-03-02

## 2020-03-02 NOTE — LETTER
March 2, 2020      Kristina Orozco MD  [address]  [facility] [address]           [place] [facility]  [address]  [facility] [address]  Phone: [phone]          Patient: Jacqueline O Favret   MR Number: [id number]   YOB: 1945   Date of Visit: 3/2/2020       Dear Dr. Kristina Orozco:    Thank you for referring Jacqueline Favret to me for evaluation. Attached you will find relevant portions of my assessment and plan of care.    If you have questions, please do not hesitate to call me. I look forward to following Jacqueline Favret along with you.    Sincerely,    Rock Kingsley Jr., MD    Enclosure  CC:  No Recipients    If you would like to receive this communication electronically, please contact [email or web address] or [phone] to request more information on [employer] access.    For providers and/or their staff who would like to refer a patient to [facility], please contact us through our one-stop-shop provider referral line, [facility], at [phone].    If you feel you have received this communication in error or would no longer like to receive these types of communications, please e-mail [email or web address]

## 2020-03-02 NOTE — PROGRESS NOTES
Subjective:      Patient ID: Jacqueline O Favret is a 74 y.o. female.    Chief Complaint: Pain and Swelling of the Right Elbow      HPI  Jacqueline O Favret is a  74 y.o. female presenting today for swelling of the right elbow.  There was not a history of trauma.  Onset of symptoms began about 3 or 4 weeks ago  At 1 point it seemed to drain spontaneously then and filled up again  She has tried a wrap as well as an elbow pad but admits to wearing it intermittently  Pain is minimal no history of redness or drainage it is a little bit tender when she bumps it.      Review of patient's allergies indicates:  No Known Allergies      Current Outpatient Medications   Medication Sig Dispense Refill    albuterol (PROVENTIL/VENTOLIN HFA) 90 mcg/actuation inhaler INHALE 1-2 PUFFS INTO THE LUNGS EVERY 4 (FOUR) HOURS AS NEEDED. 18 Inhaler 3    ALPRAZolam (XANAX) 0.25 MG tablet TAKE 1/2 TO 1 TABLET BY MOUTH BEFORE FLIGHT 20 tablet 0    amLODIPine (NORVASC) 5 MG tablet Take 1 tablet (5 mg total) by mouth once daily. 90 tablet 3    co-enzyme Q-10 30 mg capsule Take 30 mg by mouth once daily.       fexofenadine (ALLEGRA) 180 MG tablet Take by mouth daily as needed. 1 Tablet Oral Every day      fluticasone propionate (FLONASE) 50 mcg/actuation nasal spray 1 spray (50 mcg total) by Each Nare route once daily. 16 mL 12    metoprolol tartrate (LOPRESSOR) 50 MG tablet TAKE 1 TABLET BY MOUTH TWICE A  tablet 2    mirtazapine (REMERON) 15 MG tablet Take 1 tablet (15 mg total) by mouth every evening. 90 tablet 1    multivitamin (ONE DAILY MULTIVITAMIN) per tablet Take 1 tablet by mouth once daily.      potassium chloride 10% (KAYCIEL) 20 mEq/15 mL oral solution Take 15 mLs (20 mEq total) by mouth once daily. 473 mL 1    rosuvastatin (CRESTOR) 20 MG tablet TAKE 1 TABLET BY MOUTH EVERY DAY 90 tablet 1    VENTOLIN HFA 90 mcg/actuation inhaler INHALE 1-2 PUFFS INTO THE LUNGS EVERY 4 (FOUR) HOURS AS NEEDED.  1     No current  facility-administered medications for this visit.        Past Medical History:   Diagnosis Date    Abnormal liver enzymes 4/6/2015    Acute on chronic kidney disease, stage 3 4/18/2013    Alcohol-induced acute pancreatitis 8/16/2015    Anemia     Basal cell carcinoma 1985    nose    Bunion, right foot 12/14/2012    Compression fracture 11/14/2013    MI (myocardial infarction) 1991    PAF (paroxysmal atrial fibrillation) 9/8/2015    During acute illness     SCC (squamous cell carcinoma) 05/11/2016    in situ left lower lip, nasal bridge       Past Surgical History:   Procedure Laterality Date    blephroplasty      BREAST BIOPSY Right     excisional 1985    BUNIONECTOMY  Jan 2013    right foot    CARDIAC CATHETERIZATION      COLONOSCOPY N/A 9/20/2016    Procedure: COLONOSCOPY;  Surgeon: Rachelle Guerra MD;  [facility]  Service: Endoscopy;  Laterality: N/A;    COLONOSCOPY N/A 12/21/2016    Procedure: COLONOSCOPY;  Surgeon: Freedom Sandoval MD;  [facility]  Service: Endoscopy;  Laterality: N/A;  PREP: PREPOPIK  SCGEDULKE EAFRLY IN [facility]    COLONOSCOPY N/A 12/9/2019    Procedure: COLONOSCOPY;  Surgeon: Dejuan Calhoun MD;  [facility]  Service: Endoscopy;  Laterality: N/A;    ESOPHAGOGASTRODUODENOSCOPY N/A 7/19/2018    Procedure: EGD (ESOPHAGOGASTRODUODENOSCOPY);  Surgeon: Jefferson Mina MD;  [facility]  Service: Endoscopy;  Laterality: N/A;    FOOT SURGERY      right foot    HYSTERECTOMY  1980s    bleeding    orbital fx      TONSILLECTOMY      VAGINA SURGERY         Review of Systems:  ROS    OBJECTIVE:     PHYSICAL EXAM:       Vitals:    03/02/20 1541   PainSc:   5     Well developed, well nourished female in no acute distress  Alert and oriented x 3  HEENT- Normal exam  Lungs- Clear to auscultation  Heart- Regular rate and rhythm  Abdomen- Soft nontender  Extremity exam- examination right elbow there is  swelling of the olecranon bursa which appears to be thick and lobulated  Slightly tender  Slightly red  No drainage no open wound  Range of motion elbow full no instability    RADIOGRAPHS:  AP lateral x-rays right elbow demonstrate no bony abnormalities  Comments: I have personally reviewed the imaging and I agree with the above radiologist's report.    ASSESSMENT/PLAN:     IMPRESSION:  Olecranon bursitis right elbow with effusion      PLAN:  I explained the nature of the problem the patient. She would like to an attempted aspiration although explained that the often the tissue and fluid is very thick  After pause for time-out identified the right elbow injected with xylocaine 3 cc sterile technique  Following this attempted aspiration of the bursa was performed removing only about 3 or 4 cc of thick fluid and was unable to remove more than that  She tolerated the procedure well without complication  Following this we placed a compressive bandage recommended ice tonight and anti-inflammatory medicine by mouth  She does have an elbow pad I would like her to start using that tomorrow during the day  I have also placed her on minocycline 100 mg b.i.d. as a precaution in K she has a mild chronic infection which could predispose to the bursitis  Follow-up 3-4 weeks for recheck if symptoms persist we may consider surgical excision       - We talked at length about the anatomy and pathophysiology of   Encounter Diagnosis   Name Primary?    Olecranon bursitis of right elbow            Disclaimer: This note has been generated using voice-recognition software. There may be typographical errors that have been missed during proof-reading.

## 2020-03-06 ENCOUNTER — HOSPITAL ENCOUNTER (OUTPATIENT)
Dept: RADIOLOGY | Facility: HOSPITAL | Age: 75
Discharge: HOME | End: 2020-03-06
Attending: INTERNAL MEDICINE
Payer: MEDICARE

## 2020-03-06 DIAGNOSIS — Z12.31 SCREENING MAMMOGRAM, ENCOUNTER FOR: ICD-10-CM

## 2020-03-06 PROCEDURE — 77067 SCR MAMMO BI INCL CAD: CPT | Mod: TC

## 2020-03-06 PROCEDURE — 77067 MAMMO DIGITAL SCREENING BILAT WITH TOMOSYNTHESIS_CAD: ICD-10-PCS | Mod: 26,,, | Performed by: RADIOLOGY

## 2020-03-06 PROCEDURE — 77063 BREAST TOMOSYNTHESIS BI: CPT | Mod: 26,,, | Performed by: RADIOLOGY

## 2020-03-06 PROCEDURE — 77063 MAMMO DIGITAL SCREENING BILAT WITH TOMOSYNTHESIS_CAD: ICD-10-PCS | Mod: 26,,, | Performed by: RADIOLOGY

## 2020-03-06 PROCEDURE — 77067 SCR MAMMO BI INCL CAD: CPT | Mod: 26,,, | Performed by: RADIOLOGY

## 2020-03-15 ENCOUNTER — HOSPITAL ENCOUNTER (OUTPATIENT)
Facility: HOSPITAL | Age: 75
Discharge: LEFT BEFORE DISCHARGE | End: 2020-03-15
Attending: EMERGENCY MEDICINE
Payer: MEDICARE

## 2020-03-15 ENCOUNTER — NURSE TRIAGE (OUTPATIENT)
Dept: ADMINISTRATIVE | Facility: CLINIC | Age: 75
End: 2020-03-15

## 2020-03-15 VITALS
OXYGEN SATURATION: 97 % | RESPIRATION RATE: 16 BRPM | HEART RATE: 83 BPM | TEMPERATURE: 98 F | SYSTOLIC BLOOD PRESSURE: 153 MMHG | DIASTOLIC BLOOD PRESSURE: 66 MMHG

## 2020-03-15 DIAGNOSIS — N17.9 ACUTE KIDNEY INJURY SUPERIMPOSED ON CHRONIC KIDNEY DISEASE: Primary | ICD-10-CM

## 2020-03-15 DIAGNOSIS — R80.9 PROTEINURIA, UNSPECIFIED TYPE: ICD-10-CM

## 2020-03-15 DIAGNOSIS — G44.319 ACUTE POST-TRAUMATIC HEADACHE, NOT INTRACTABLE: ICD-10-CM

## 2020-03-15 DIAGNOSIS — N18.9 ACUTE KIDNEY INJURY SUPERIMPOSED ON CHRONIC KIDNEY DISEASE: Primary | ICD-10-CM
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
## 2020-03-15 LAB
ANION GAP SERPL CALC-SCNC: 16 MMOL/L (ref 8–16)
BACTERIA #/AREA URNS AUTO: NORMAL /HPF
BASOPHILS # BLD AUTO: 0.04 K/UL (ref 0–0.2)
BASOPHILS NFR BLD: 0.6 % (ref 0–1.9)
BILIRUB UR QL STRIP: NEGATIVE
BUN SERPL-MCNC: 27 MG/DL (ref 8–23)
CALCIUM SERPL-MCNC: 9.2 MG/DL (ref 8.7–10.5)
CHLORIDE SERPL-SCNC: 99 MMOL/L (ref 95–110)
CLARITY UR REFRACT.AUTO: ABNORMAL
CO2 SERPL-SCNC: 25 MMOL/L (ref 23–29)
COLOR UR AUTO: YELLOW
CREAT SERPL-MCNC: 1.6 MG/DL (ref 0.5–1.4)
DIFFERENTIAL METHOD: ABNORMAL
EOSINOPHIL # BLD AUTO: 0 K/UL (ref 0–0.5)
EOSINOPHIL NFR BLD: 0.4 % (ref 0–8)
ERYTHROCYTE [DISTWIDTH] IN BLOOD BY AUTOMATED COUNT: 13 % (ref 11.5–14.5)
EST. GFR  (AFRICAN AMERICAN): 36.6 ML/MIN/1.73 M^2
EST. GFR  (NON AFRICAN AMERICAN): 31.7 ML/MIN/1.73 M^2
GLUCOSE SERPL-MCNC: 78 MG/DL (ref 70–110)
GLUCOSE UR QL STRIP: NEGATIVE
HCT VFR BLD AUTO: 42.6 % (ref 37–48.5)
HGB BLD-MCNC: 14.1 G/DL (ref 12–16)
HGB UR QL STRIP: ABNORMAL
HYALINE CASTS UR QL AUTO: 0 /LPF
IMM GRANULOCYTES # BLD AUTO: 0.04 K/UL (ref 0–0.04)
IMM GRANULOCYTES NFR BLD AUTO: 0.6 % (ref 0–0.5)
KETONES UR QL STRIP: NEGATIVE
LEUKOCYTE ESTERASE UR QL STRIP: NEGATIVE
LYMPHOCYTES # BLD AUTO: 1 K/UL (ref 1–4.8)
LYMPHOCYTES NFR BLD: 15 % (ref 18–48)
MAGNESIUM SERPL-MCNC: 2.3 MG/DL (ref 1.6–2.6)
MCH RBC QN AUTO: 34.1 PG (ref 27–31)
MCHC RBC AUTO-ENTMCNC: 33.1 G/DL (ref 32–36)
MCV RBC AUTO: 103 FL (ref 82–98)
MICROSCOPIC COMMENT: NORMAL
MONOCYTES # BLD AUTO: 0.9 K/UL (ref 0.3–1)
MONOCYTES NFR BLD: 12.7 % (ref 4–15)
NEUTROPHILS # BLD AUTO: 4.8 K/UL (ref 1.8–7.7)
NEUTROPHILS NFR BLD: 70.7 % (ref 38–73)
NITRITE UR QL STRIP: NEGATIVE
NRBC BLD-RTO: 0 /100 WBC
PH UR STRIP: 7 [PH] (ref 5–8)
PHOSPHATE SERPL-MCNC: 3.5 MG/DL (ref 2.7–4.5)
PLATELET # BLD AUTO: 97 K/UL (ref 150–350)
PMV BLD AUTO: 11.6 FL (ref 9.2–12.9)
POTASSIUM SERPL-SCNC: 3.6 MMOL/L (ref 3.5–5.1)
PROT UR QL STRIP: ABNORMAL
RBC # BLD AUTO: 4.14 M/UL (ref 4–5.4)
RBC #/AREA URNS AUTO: 4 /HPF (ref 0–4)
SODIUM SERPL-SCNC: 140 MMOL/L (ref 136–145)
SP GR UR STRIP: 1 (ref 1–1.03)
SQUAMOUS #/AREA URNS AUTO: 2 /HPF
URN SPEC COLLECT METH UR: ABNORMAL
WBC # BLD AUTO: 6.75 K/UL (ref 3.9–12.7)
WBC #/AREA URNS AUTO: 4 /HPF (ref 0–5)

## 2020-03-15 PROCEDURE — 83735 ASSAY OF MAGNESIUM: CPT

## 2020-03-15 PROCEDURE — 96360 HYDRATION IV INFUSION INIT: CPT

## 2020-03-15 PROCEDURE — 81001 URINALYSIS AUTO W/SCOPE: CPT

## 2020-03-15 PROCEDURE — G0378 HOSPITAL OBSERVATION PER HR: HCPCS

## 2020-03-15 PROCEDURE — 99284 EMERGENCY DEPT VISIT MOD MDM: CPT | Mod: 25

## 2020-03-15 PROCEDURE — 25000003 PHARM REV CODE 250: Performed by: STUDENT IN AN ORGANIZED HEALTH CARE EDUCATION/TRAINING PROGRAM

## 2020-03-15 PROCEDURE — 84100 ASSAY OF PHOSPHORUS: CPT

## 2020-03-15 PROCEDURE — 85025 COMPLETE CBC W/AUTO DIFF WBC: CPT

## 2020-03-15 PROCEDURE — 99285 PR EMERGENCY DEPT VISIT,LEVEL V: ICD-10-PCS | Mod: ,,, | Performed by: EMERGENCY MEDICINE

## 2020-03-15 PROCEDURE — 80048 BASIC METABOLIC PNL TOTAL CA: CPT

## 2020-03-15 PROCEDURE — 99285 EMERGENCY DEPT VISIT HI MDM: CPT | Mod: ,,, | Performed by: EMERGENCY MEDICINE

## 2020-03-15 PROCEDURE — 63600175 PHARM REV CODE 636 W HCPCS: Performed by: STUDENT IN AN ORGANIZED HEALTH CARE EDUCATION/TRAINING PROGRAM

## 2020-03-15 RX ORDER — SODIUM CHLORIDE 9 MG/ML
500 INJECTION, SOLUTION INTRAVENOUS
Status: COMPLETED | OUTPATIENT
Start: 2020-03-15 | End: 2020-03-15

## 2020-03-15 RX ORDER — ACETAMINOPHEN 325 MG/1
650 TABLET ORAL EVERY 6 HOURS PRN
Status: DISCONTINUED | OUTPATIENT
Start: 2020-03-15 | End: 2020-03-15 | Stop reason: HOSPADM

## 2020-03-15 RX ORDER — ONDANSETRON 4 MG/1
4 TABLET, ORALLY DISINTEGRATING ORAL
Status: COMPLETED | OUTPATIENT
Start: 2020-03-15 | End: 2020-03-15

## 2020-03-15 RX ORDER — BUTALBITAL, ACETAMINOPHEN AND CAFFEINE 50; 325; 40 MG/1; MG/1; MG/1
1 TABLET ORAL EVERY 4 HOURS PRN
Status: DISCONTINUED | OUTPATIENT
Start: 2020-03-15 | End: 2020-03-15 | Stop reason: HOSPADM

## 2020-03-15 RX ADMIN — ONDANSETRON 4 MG: 4 TABLET, ORALLY DISINTEGRATING ORAL at 06:03

## 2020-03-15 RX ADMIN — SODIUM CHLORIDE 500 ML: 0.9 INJECTION, SOLUTION INTRAVENOUS at 07:03

## 2020-03-15 NOTE — ED PROVIDER NOTES
Encounter Date: 3/15/2020       History     Chief Complaint   Patient presents with    Fall     Recent fall on wednesday, patient did hit head and complains of headache 6/10 . NO LOC . No blood thinners.  Patient also complains of N/V A/0 x4. GCS; 15.      73 yo F with PMH of CKD3 and HTN who presents to ED s/p fall. She fell 5 days ago and reports hitting her head. She reports a worsening headache since falling, diffuse, 8/10, with associated N/V. She reports her headache worsening since Wednesday. She also reports L toe pain. She injured her L great toe in tripping prior to the fall. She denies LOC and denies use of blood thinners. She denies dizziness, lightheadedness, weakness, changes in vision, chest pain, SOB, abdominal pain.        Review of patient's allergies indicates:  No Known Allergies  Past Medical History:   Diagnosis Date    Abnormal liver enzymes 4/6/2015    Acute on chronic kidney disease, stage 3 4/18/2013    Alcohol-induced acute pancreatitis 8/16/2015    Anemia     Basal cell carcinoma 1985    nose    Bunion, right foot 12/14/2012    Compression fracture 11/14/2013    MI (myocardial infarction) 1991    PAF (paroxysmal atrial fibrillation) 9/8/2015    During acute illness     SCC (squamous cell carcinoma) 05/11/2016    in situ left lower lip, nasal bridge     Past Surgical History:   Procedure Laterality Date    blephroplasty      BREAST BIOPSY Right     excisional 1985    BUNIONECTOMY  Jan 2013    right foot    CARDIAC CATHETERIZATION      COLONOSCOPY N/A 9/20/2016    Procedure: COLONOSCOPY;  Surgeon: Rachelle Guerra MD;  Location: [address]);  Service: Endoscopy;  Laterality: N/A;    COLONOSCOPY N/A 12/21/2016    Procedure: COLONOSCOPY;  Surgeon: Freedom Sandoval MD;  Location: [facility] ([address] [place] [address]);  Service: Endoscopy;  Laterality: N/A;  PREP: PREPOPIK  YOLANDA FERNÁNDEZLY IN [facility]    COLONOSCOPY N/A 12/9/2019    Procedure: COLONOSCOPY;  Surgeon: Dejuan Calhoun,  MD;  Location: [facility] ([address] [place] [address]);  Service: Endoscopy;  Laterality: N/A;    ESOPHAGOGASTRODUODENOSCOPY N/A 7/19/2018    Procedure: EGD (ESOPHAGOGASTRODUODENOSCOPY);  Surgeon: Jefferson Mina MD;  Location: [facility] ([address] [place] [address]);  Service: Endoscopy;  Laterality: N/A;    FOOT SURGERY      right foot    HYSTERECTOMY  1980s    bleeding    orbital fx      TONSILLECTOMY      VAGINA SURGERY       Family History   Problem Relation Age of Onset    Diabetes Mother     Heart disease Father 57        sudden death    Celiac disease Neg Hx     Colon cancer Neg Hx     Crohn's disease Neg Hx     Esophageal cancer Neg Hx     Inflammatory bowel disease Neg Hx     Irritable bowel syndrome Neg Hx     Liver cancer Neg Hx     Rectal cancer Neg Hx     Stomach cancer Neg Hx     Ulcerative colitis Neg Hx     Melanoma Neg Hx      Social History     Tobacco Use    Smoking status: Current Every Day Smoker     Packs/day: 0.50     Years: 15.00     Pack years: 7.50     Types: Cigarettes    Smokeless tobacco: Never Used   Substance Use Topics    Alcohol use: Yes     Alcohol/week: 5.0 - 10.0 standard drinks     Types: 5 - 10 Standard drinks or equivalent per week     Frequency: 4 or more times a week     Drinks per session: 1 or 2     Binge frequency: Never     Comment: 1 glass scotch a day, last use last night    Drug use: No     Review of Systems   Constitutional: Negative for chills and fever.   HENT: Negative for sore throat and trouble swallowing.    Eyes: Negative for photophobia and visual disturbance.   Respiratory: Negative for chest tightness and shortness of breath.    Cardiovascular: Negative for chest pain and palpitations.   Gastrointestinal: Positive for nausea and vomiting. Negative for abdominal pain.   Genitourinary: Negative for difficulty urinating and dysuria.   Musculoskeletal: Negative for back pain, neck pain and neck stiffness.   Skin: Negative for rash and wound.   Neurological: Positive for  headaches. Negative for dizziness, weakness, light-headedness and numbness.   Psychiatric/Behavioral: Negative for agitation and confusion.       Physical Exam     Initial Vitals [03/15/20 1616]   BP Pulse Resp Temp SpO2   110/62 65 16 98.2 °F (36.8 °C) 98 %      MAP       --         Physical Exam    Constitutional: She appears well-developed and well-nourished. She is not diaphoretic. No distress.   HENT:   Head: Normocephalic and atraumatic.   Eyes: Conjunctivae are normal. No scleral icterus.   Neck: Normal range of motion. No JVD present.   Cardiovascular: Normal rate, regular rhythm and normal heart sounds.   Pulmonary/Chest: Breath sounds normal. No respiratory distress. She has no wheezes.   Abdominal: Soft. Bowel sounds are normal.   Musculoskeletal: She exhibits no edema or tenderness.   L great toe bruising and pain to touch   Neurological: She is alert and oriented to person, place, and time. She has normal strength and normal reflexes. She displays normal reflexes. No cranial nerve deficit or sensory deficit.   Skin: Skin is warm and dry.   Psychiatric: She has a normal mood and affect. Thought content normal.         ED Course   Procedures  Labs Reviewed   BASIC METABOLIC PANEL   MAGNESIUM   PHOSPHORUS   CBC W/ AUTO DIFFERENTIAL          Imaging Results    None          Medical Decision Making:   Initial Assessment:   75 yo F with PMH of possible osteoporosis, CKD3, HTN who presents s/p fall, worsening headache, Neuro exam intact, stable  Differential Diagnosis:   Post-concussive versus subdural versus subarachnoid versus skull fx versus L great toe fracture  Clinical Tests:   Lab Tests: Ordered  Radiological Study: Ordered  Medical Tests: Ordered  ED Management:  -BMP, Mg, Ph, CT Head, Xray L foot ordered  -Xray L foot showing no fracture 6:22 PM  -BMP showing evidence of SHYANNE on CKD with Cr 1.6 on baseline of ~1-1.2, will order 500 mLs NS bolus  -CT Head showing no acute intracranial abnormalities 7:26  PM  -UA with 2+ protein but no evidence of infection 7:26 PM  -Will place in observation with hospital medicine 7:37 PM  -Patient left AMA. She was explained extensively about the risks regarding her SHYANNE and that it may progress to renal failure and result in eletctrolyte abnormalities which may result in death as well as progress to chronic renal failure requiring hemodialysis. 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 She signed the AMA release form prior to leaving the hospital. 8:29 PM        Other:   I discussed test(s) with the performing physician.       <> Summary of the Findings: Case discussed with Dr. Escobar                   ED Course as of Mar 15 1913   Sun Mar 15, 2020   1902 Creatinine(!): 1.6 [MK]   1903 Creatinine(!): 1.6 [MK]   1903 Creatinine(!): 1.6 [MK]   1903 Creatinine(!): 1.6 [MK]      ED Course User Index  [MK] Junito Flores MD                Clinical Impression:       ICD-10-CM ICD-9-CM   1. Acute kidney injury superimposed on chronic kidney disease N17.9 866.00    N18.9 585.9   2. Fall W19.XXXA E888.9   3. Acute post-traumatic headache, not intractable G44.319 339.21   4. Proteinuria, unspecified type R80.9 791.0                                Junito Flores MD  Resident  03/15/20 1938       Junito Flores MD  Resident  03/15/20 2029

## 2020-03-15 NOTE — ED TRIAGE NOTES
Jacqueline O Favret, a 74 y.o. female arrived via EMS for recent fall on wednesday, patient did hit head and complains of headache 6/10 . NO LOC . No blood thinners.  Patient also complains of N/V A/0 x4. GCS; 15    Patient denies chest pain and shortness of breath     Patient denies fevers and chills     Triage note:  Chief Complaint   Patient presents with    Fall     Recent fall on wednesday, patient did hit head and complains of headache 6/10 . NO LOC . No blood thinners.  Patient also complains of N/V A/0 x4. GCS; 15.      Review of patient's allergies indicates:  No Known Allergies  Past Medical History:   Diagnosis Date    Abnormal liver enzymes 4/6/2015    Acute on chronic kidney disease, stage 3 4/18/2013    Alcohol-induced acute pancreatitis 8/16/2015    Anemia     Basal cell carcinoma 1985    nose    Bunion, right foot 12/14/2012    Compression fracture 11/14/2013    MI (myocardial infarction) 1991    PAF (paroxysmal atrial fibrillation) 9/8/2015    During acute illness     SCC (squamous cell carcinoma) 05/11/2016    in situ left lower lip, nasal bridge

## 2020-03-15 NOTE — ED NOTES
Patient identifiers verified and correct for Jacqueline O Favret.    LOC: The patient is awake, alert and aware of environment with an appropriate affect, the patient is oriented x 4 and speaking appropriately.    APPEARANCE: Patient resting comfortably and in no acute distress, patient is clean and well groomed, patient's clothing is properly fastened.    SKIN: The skin is warm and dry, color consistent with ethnicity, patient has normal skin turgor and moist mucus membranes, skin intact, no breakdown or bruising noted.    MUSCULOSKELETAL: Patient moving all extremities spontaneously, no obvious swelling or deformities noted.    RESPIRATORY: Airway is open and patent, respirations are spontaneous, patient has a normal effort and rate, no accessory muscle use noted, bilateral breath sounds clear  CARDIAC: Patient has a normal rate and regular rhythm, no periphreal edema noted, capillary refill < 3 seconds.  ABDOMEN: Soft and non tender to palpation, no distention noted, normoactive bowel sounds present in all four quadrants.  NEUROLOGIC: PERRLA, 3 mm bilaterally, eyes open spontaneously, behavior appropriate to situation, follows commands, facial expression symmetrical, bilateral hand grasp equal and even, purposeful motor response noted, normal sensation in all extremities when touched with a finger.

## 2020-03-15 NOTE — TELEPHONE ENCOUNTER
Reason for Disposition   Patient already left for the hospital/clinic.    Additional Information   Negative: Caller is angry or rude (e.g., hangs up, verbally abusive, yelling)   Negative: Caller hangs up   Negative: Caller has already spoken with the PCP and has no further questions.   Negative: Caller has already spoken with another triager and has no further questions.   Negative: Caller has already spoken with another triager or PCP AND has further questions AND triager able to answer questions.   Negative: Pager number given.  Answering service notified.   Negative: Cell phone out of range.  Phone number verified.   Negative: Second attempt to contact family AND no contact made.  Phone number verified.   Negative: Wrong number reached.  Phone number verified.   Negative: Message left with person in household.   Negative: Message left on unidentified voice mail.  Phone number verified.   Negative: Message left on identified voice mail   Negative: No answer.  First attempt to contact caller.  Follow-up call scheduled within 15 minutes.   Negative: Busy signal.  First attempt to contact caller.  Follow-up call scheduled within 15 minutes.    Protocols used: NO CONTACT OR DUPLICATE CONTACT CALL-A-  pt currently in ED per [facility]

## 2020-03-16 ENCOUNTER — TELEPHONE (OUTPATIENT)
Dept: INTERNAL MEDICINE | Facility: CLINIC | Age: 75
End: 2020-03-16

## 2020-03-16 DIAGNOSIS — R11.0 NAUSEA: Primary | ICD-10-CM

## 2020-03-16 RX ORDER — ONDANSETRON 4 MG/1
4 TABLET, FILM COATED ORAL EVERY 8 HOURS PRN
Qty: 20 TABLET | Refills: 0 | Status: SHIPPED | OUTPATIENT
Start: 2020-03-16

## 2020-03-16 NOTE — TELEPHONE ENCOUNTER
Pt reassured no bleed seen on CT - still has some nausea - zofran sent advised fluids and rest - call for worsening sx - she declines an appt.

## 2020-03-16 NOTE — ED NOTES
Pt states she does not want to be admitted for observation if her CT was negative. Sandra YEPEZ notified

## 2020-03-17 ENCOUNTER — PATIENT MESSAGE (OUTPATIENT)
Dept: INTERNAL MEDICINE | Facility: CLINIC | Age: 75
End: 2020-03-17

## 2020-03-18 ENCOUNTER — PATIENT MESSAGE (OUTPATIENT)
Dept: OTOLARYNGOLOGY | Facility: CLINIC | Age: 75
End: 2020-03-18

## 2020-03-25 RX ORDER — POTASSIUM CHLORIDE 20 MEQ/15ML
20 SOLUTION ORAL DAILY
Qty: 473 ML | Refills: 1 | Status: SHIPPED | OUTPATIENT
Start: 2020-03-25

## 2020-06-09 ENCOUNTER — PATIENT OUTREACH (OUTPATIENT)
Dept: INTERNAL MEDICINE | Facility: CLINIC | Age: 75
End: 2020-06-09

## 2021-04-05 ENCOUNTER — PATIENT MESSAGE (OUTPATIENT)
Dept: ADMINISTRATIVE | Facility: HOSPITAL | Age: 76
End: 2021-04-05

## 2021-05-31 ENCOUNTER — PATIENT OUTREACH (OUTPATIENT)
Dept: ADMINISTRATIVE | Facility: HOSPITAL | Age: 76
End: 2021-05-31

## 2021-05-31 ENCOUNTER — PATIENT MESSAGE (OUTPATIENT)
Dept: ADMINISTRATIVE | Facility: HOSPITAL | Age: 76
End: 2021-05-31

## 2021-05-31 DIAGNOSIS — Z12.31 ENCOUNTER FOR SCREENING MAMMOGRAM FOR BREAST CANCER: Primary | ICD-10-CM

## 2021-07-06 ENCOUNTER — PATIENT MESSAGE (OUTPATIENT)
Dept: ADMINISTRATIVE | Facility: HOSPITAL | Age: 76
End: 2021-07-06

## 2021-10-05 ENCOUNTER — PATIENT MESSAGE (OUTPATIENT)
Dept: ADMINISTRATIVE | Facility: HOSPITAL | Age: 76
End: 2021-10-05

## 2022-01-26 ENCOUNTER — PATIENT MESSAGE (OUTPATIENT)
Dept: ADMINISTRATIVE | Facility: HOSPITAL | Age: 77
End: 2022-01-26
Payer: MEDICARE

## 2022-07-11 ENCOUNTER — TELEPHONE (OUTPATIENT)
Dept: ADMINISTRATIVE | Facility: HOSPITAL | Age: 77
End: 2022-07-11
Payer: MEDICARE

## 2022-07-11 ENCOUNTER — PATIENT MESSAGE (OUTPATIENT)
Dept: ADMINISTRATIVE | Facility: HOSPITAL | Age: 77
End: 2022-07-11
Payer: MEDICARE

## 2023-05-10 NOTE — PROGRESS NOTES
Assessment /Plan     For exam results, see Encounter Report.    1. Encounter for fitting or adjustment of spectacles or contact lenses                      Bilateral nuclear sclerosis of lens, consistent with age.  S/P blepharoplasty bilaterally.  S/P surgery for repair of apparent orbital fracture secondary to fall.  Dilated fundus exam (DFE) not done today, per request.  Suggest return when convenient for no charge visit for DFE.    Hyperopia with astigmatism in the right eye, and hyperopia in the left eye.  Good best-corrected VA in each eye per post CL refraction.  Presbyopia consistent with age.  New spectacle lens Rx issued for use in lieu of CLs    Good lens fit in each eye with present monovision SCLs (Biofinity Toric in OD for distance and Biofinity spherical lens in OS for near).  Wearing CLs well in each eye.  Showing need for slight power change in each lens.  New/updated CL Rx issued    Repeat general eye exam and refraction and CL follow up in one year.     
Fall with Harm Risk

## 2023-11-20 NOTE — PROGRESS NOTES
Assessment per [facility] guidelines  Non labored breathing, skin dry warm and appropriate for race.Abdomen soft      HPI     Concerns About Ocular Health    Additional comments: Eye exam - general eye exam and refraction, and   contact lens follow-up visit.  Reports no problems.             Comments     Patient's age: 75 yr.o. [place]  Occupation: Retired  Approximate date of last eye examination:  08/11/2016  Name of last eye doctor seen: Dr. Goodman   City/State: [place], [place]  Wears glasses? Yes     If yes, wears  Full-time  Present glasses are: Progressive  Approximate age of present glasses:   3 yrs old   Got new glasses following last exam, or subsequently?:  No   Any problem with VA with glasses?  No  Wears CLs?:  Yes   Air Optix Aqua  OD 8.60 14.2 +1.25 Sphere ( distance)   OS 8.60 14.2 +3.50 Sphere ( near)   Headaches? No  Eye pain/discomfort?   No  Flashes?  No  Floaters?  No  Diplopia/Double vision? No  Patient's Ocular History:         Any eye surgeries? Yes - Blepharoplasty - Dr Quiles/ Secondary   Blepharoplasy - Dr Gao              No other eye surgery.          Any eye injury?  Yes - 10/2014         Any treatment for eye disease?  No  Family history of eye disease?  No  Significant patient medical history:         1. Diabetes?  No       If yes, IDDM or NIDDM? NA   2. HBP?  Yes, controlled by medication              3. Other (describe):  None reported.    ! OTC eyedrops currently using:  Occasional use of artifical tears for   dryness    ! Prescription eye meds currently using:  No   ! Any history of allergy/adverse reaction to any eye meds used   previously?  No    ! Any history of allergy/adverse reaction to eyedrops used during prior   eye exam(s)? No    ! Any history of allergy/adverse reaction to Novacaine or similar meds?   No   ! Any history of allergy/adverse reaction to Epinephrine or similar meds?   No    ! Patient okay with use of anesthetic eyedrops to check eye pressure?    Yes        ! Patient okay with use of eyedrops to dilate pupils today?  Yes    !  Allergies/Medications/Medical History/Family  "History reviewed today?    Yes        PD =   61/57  Desired reading distance =  17"                                                                        Last edited by Jeromy Goodman, OD on 11/2/2017  9:56 AM. (History)            Assessment /Plan     For exam results, see Encounter Report.    1. Nuclear sclerosis, bilateral     2. Essential hypertension     3. History of reduction of orbital fracture     4. Screening for eye condition     5. Hyperopia with astigmatism and presbyopia, bilateral                    Prior diagnosis of nuclear sclerotic/cortical cataract in both eyes.  No need for cataract surgery in either eye, based on the correctable visual acuity in each eye today    History of orbital fracture on the right side, with surgical repair.  S/P blepharoplasty on the right side (Dr. Gao).    Intraocular pressure indicates IOP within normal range OD and OS   Dilated fundus exam not done today per Ms. Favret's request.   Defer to later date.    Hyperopia with astigmatism in each eye, with satisfactory correctable VA in each eye.  Presbyopia consistent with age.  New spectacle lens Rx issued for use as desired.    Good contact lens fit with present monovision SCLs.  Wearing CLs well in each eye.  Happy with monovision effect.  Power of each CL okay as is (right lens for distance and left lens for near).     Repeat refraction in one year.     "